# Patient Record
Sex: FEMALE | Race: BLACK OR AFRICAN AMERICAN | Employment: UNEMPLOYED | URBAN - METROPOLITAN AREA
[De-identification: names, ages, dates, MRNs, and addresses within clinical notes are randomized per-mention and may not be internally consistent; named-entity substitution may affect disease eponyms.]

---

## 2017-06-27 ENCOUNTER — MEDICAL CORRESPONDENCE (OUTPATIENT)
Dept: HEALTH INFORMATION MANAGEMENT | Facility: CLINIC | Age: 57
End: 2017-06-27

## 2017-07-11 ENCOUNTER — TRANSFERRED RECORDS (OUTPATIENT)
Dept: HEALTH INFORMATION MANAGEMENT | Facility: CLINIC | Age: 57
End: 2017-07-11

## 2017-07-12 ENCOUNTER — OFFICE VISIT (OUTPATIENT)
Dept: ORTHOPEDICS | Facility: CLINIC | Age: 57
End: 2017-07-12

## 2017-07-12 VITALS
DIASTOLIC BLOOD PRESSURE: 66 MMHG | SYSTOLIC BLOOD PRESSURE: 138 MMHG | HEIGHT: 68 IN | BODY MASS INDEX: 28.34 KG/M2 | WEIGHT: 187 LBS

## 2017-07-12 DIAGNOSIS — R29.898 WEAKNESS OF LEFT HIP: ICD-10-CM

## 2017-07-12 DIAGNOSIS — M25.552 LEFT HIP PAIN: Primary | ICD-10-CM

## 2017-07-12 NOTE — LETTER
"  2017      RE: Nancy Rodríguez  [address]    [facility] [address]        Subjective:   Nancy Rodríguez is a 57 year old female  who is here for left leg and pelvic pain x 2 months.  7 years ago had a left ankle fracture.  Walking and fell, dizziness.  Normal surface.  She had surgery, removal of broken bones, hardware placed, had to remove later.  [place] [facility] [place]  Now pain from hip to knee, on left.  No PT.  Not certain how it started, hard to get up after laying down.  Has to walk with a cane.  Using cane since she broke her ankle.  Wasn't using it much but now with left hip pain she's using the cane more.  Recent informed that she has headaches and DM  Background:   Date of injury: hx of left leg fracture many years ago      PAST MEDICAL, SOCIAL, SURGICAL AND FAMILY HISTORY: She  has a past medical history of NO ACTIVE PROBLEMS.  She  has a past surgical history that includes Ankle surgery.  Her family history is negative for Glaucoma and Macular Degeneration.  She reports that she has never smoked. She has never used smokeless tobacco. She reports that she does not drink alcohol or use illicit drugs.    ALLERGIES: She has No Known Allergies.    CURRENT MEDICATIONS: She has a current medication list which includes the following prescription(s): docusate sodium, calcium carb-cholecalciferol, omeprazole, quetiapine fumarate, aspirin, ibuprofen, and UNKNOWN TO PATIENT.     REVIEW OF SYSTEMS: 10 point review of systems is negative except as noted above.     Exam:   /66  Ht 5' 7.5\" (1.715 m)  Wt 187 lb (84.8 kg)  BMI 28.86 kg/m2           CONSTITUTIONAL: alert, mild distress, cooperative and over weight  HEAD: Normocephalic. No masses, lesions, tenderness or abnormalities  SKIN: no suspicious lesions or rashes  GAIT: antalgic and cane  NEUROLOGIC: Non-focal, Normal muscle tone and strength, reflexes normal, sensation grossly normal.  PSYCHIATRIC: affect normal/bright and mentation appears " "normal.    MUSCULOSKELETAL: left hip pain  Palpation: Tender:   'C\" left hip, lateral left leg, hip abductors weak, left leg significantly weak, left iliac crest  Non-tender:  right greater trochanter, right gluteus medius, left ASIS, right ASIS, right iliac crest  Range of Motion:  Full ROM, both hips  Strength:  Weakness entire left leg  Special tests:  no crepitation/snapping over central inguinal region, no crepitation/snapping over greater trochanter         Assessment/Plan:   Pt is a 56 yo [place]ym[place] female with PMHx of headaches and new onset DM presenting with left hip/leg pain  1. Left hip/leg pain- weakness left hip abductors, weak core- hx of 10 pregnancies.  She does have some decreased ROM in left ankle due to past fracture.  X-ray without significant bony abnormalities.  Needs to strengthen and she was referred to PT  RTC 6 weeks    X-RAY INTERPRETATION:   X-Ray of the Hip: 2-view, ap pelvis, Left Frogleg Lateral ordered and interpreted in the office today was positive for mild OA    Olga Sheth MD    "

## 2017-07-12 NOTE — PROGRESS NOTES
" Subjective:   Nancy Rodríguez is a 57 year old female  who is here for left leg and pelvic pain x 2 months.  7 years ago had a left ankle fracture.  Walking and fell, dizziness.  Normal surface.  She had surgery, removal of broken bones, hardware placed, had to remove later.  [place] [facility] [place]  Now pain from hip to knee, on left.  No PT.  Not certain how it started, hard to get up after laying down.  Has to walk with a cane.  Using cane since she broke her ankle.  Wasn't using it much but now with left hip pain she's using the cane more.  Recent informed that she has headaches and DM  Background:   Date of injury: hx of left leg fracture many years ago      PAST MEDICAL, SOCIAL, SURGICAL AND FAMILY HISTORY: She  has a past medical history of NO ACTIVE PROBLEMS.  She  has a past surgical history that includes Ankle surgery.  Her family history is negative for Glaucoma and Macular Degeneration.  She reports that she has never smoked. She has never used smokeless tobacco. She reports that she does not drink alcohol or use illicit drugs.    ALLERGIES: She has No Known Allergies.    CURRENT MEDICATIONS: She has a current medication list which includes the following prescription(s): docusate sodium, calcium carb-cholecalciferol, omeprazole, quetiapine fumarate, aspirin, ibuprofen, and UNKNOWN TO PATIENT.     REVIEW OF SYSTEMS: 10 point review of systems is negative except as noted above.     Exam:   /66  Ht 5' 7.5\" (1.715 m)  Wt 187 lb (84.8 kg)  BMI 28.86 kg/m2           CONSTITUTIONAL: alert, mild distress, cooperative and over weight  HEAD: Normocephalic. No masses, lesions, tenderness or abnormalities  SKIN: no suspicious lesions or rashes  GAIT: antalgic and cane  NEUROLOGIC: Non-focal, Normal muscle tone and strength, reflexes normal, sensation grossly normal.  PSYCHIATRIC: affect normal/bright and mentation appears normal.    MUSCULOSKELETAL: left hip pain  Palpation: Tender:   'C\" left hip, lateral " left leg, hip abductors weak, left leg significantly weak, left iliac crest  Non-tender:  right greater trochanter, right gluteus medius, left ASIS, right ASIS, right iliac crest  Range of Motion:  Full ROM, both hips  Strength:  Weakness entire left leg  Special tests:  no crepitation/snapping over central inguinal region, no crepitation/snapping over greater trochanter         Assessment/Plan:   Pt is a 58 yo Citizen of [place] female with PMHx of headaches and new onset DM presenting with left hip/leg pain  1. Left hip/leg pain- weakness left hip abductors, weak core- hx of 10 pregnancies.  She does have some decreased ROM in left ankle due to past fracture.  X-ray without significant bony abnormalities.  Needs to strengthen and she was referred to PT  RTC 6 weeks    X-RAY INTERPRETATION:   X-Ray of the Hip: 2-view, ap pelvis, Left Frogleg Lateral ordered and interpreted in the office today was positive for mild OA

## 2017-07-12 NOTE — MR AVS SNAPSHOT
After Visit Summary   7/12/2017    Nancy Rodríguez    MRN: [id number]           Patient Information     Date Of Birth          1960        Visit Information        Provider Department      7/12/2017 9:15 AM Olga Sheth MD; LANGUAGE [facility]        Today's Diagnoses     Left hip pain    -  1    Weakness of left hip           Follow-ups after your visit        Additional Services     PHYSICAL THERAPY REFERRAL (External-Prints)       Physical Therapy Referral:                  Follow-up notes from your care team     Return in about 6 weeks (around 8/23/2017), or if symptoms worsen or fail to improve.      Your next 10 appointments already scheduled     Aug 23, 2017 10:30 AM CDT   (Arrive by 10:15 AM)   Return Visit with Olga Sheth MD   [facility] ([facility])    [address] [place] [id number] [phone]              Who to contact     Please call your clinic at [phone] to:    Ask questions about your health    Make or cancel appointments    Discuss your medicines    Learn about your test results    Speak to your doctor   If you have compliments or concerns about an experience at your clinic, or if you wish to file a complaint, please contact [facility] Patient Relations at [phone] or email us at [email or web address]         Additional Information About Your Visit        MyChart Information     Prismatict is an electronic gateway that provides easy, online access to your medical records. With [employer], you can request a clinic appointment, read your test results, renew a prescription or communicate with your care team.     To sign up for Prismatict visit the website at [email or web address]   You will be asked to enter the access code listed below, as well as some personal information. Please follow the directions to create your  "username and password.     Your access code is: [email or web address]  Expires: 10/5/2017  6:30 AM     Your access code will  in 90 days. If you need help or a new code, please contact your [facility] or call [phone] for assistance.        Care EveryWhere ID     This is your Care EveryWhere ID. This could be used by other organizations to access your [facility] medical records  [id number]        Your Vitals Were     Height BMI (Body Mass Index)                5' 7.5\" (1.715 m) 28.86 kg/m2           Blood Pressure from Last 3 Encounters:   17 138/66   11 132/71    Weight from Last 3 Encounters:   17 187 lb (84.8 kg)   11 182 lb 8.7 oz (82.8 kg)              We Performed the Following     PHYSICAL THERAPY REFERRAL (External-Prints)        Primary Care Provider Office Phone # Fax #    Cordovaolinda Nick [phone]       [place] [address]        Equal Access to Services     TAVIA [facility] : Hadii annalee ku hadasho Soomaali, waaxda luqadaha, qaybta kaalmada adejah, denzel chance . So [facility] [phone].    ATENCIÓN: Si habla español, tiene a robles disposición servicios gratuitos de asistencia lingüística. [facility] [phone].    We comply with applicable federal civil rights laws and [place] laws. We do not discriminate on the basis of race, color, national origin, age, disability sex, sexual orientation or gender identity.            Thank you!     Thank you for choosing [facility]  for your care. Our goal is always to provide you with excellent care. Hearing back from our patients is one way we can continue to improve our services. Please take a few minutes to complete the written survey that you may receive in the mail after your visit with us. Thank you!             Your Updated Medication List - Protect others around you: Learn how to safely use, store and " throw away your medicines at www.disposemymeds.org.          This list is accurate as of: 7/12/17 10:50 AM.  Always use your most recent med list.                   Brand Name Dispense Instructions for use Diagnosis    ASPIRIN PO      Take 81 mg by mouth daily        CALCIUM 600 + D PO      Take 1 tablet by mouth daily        DOCUSATE SODIUM PO      Take 100 mg by mouth daily        IBUPROFEN PO      Take 600 mg by mouth as needed for moderate pain        OMEPRAZOLE PO      Take 20 mg by mouth every morning        QUETIAPINE FUMARATE PO      Take 50 mg by mouth        UNKNOWN TO PATIENT      Pt states that she takes many medications, but is uncertain what they are for and what dosages they are.

## 2017-07-18 ENCOUNTER — TRANSFERRED RECORDS (OUTPATIENT)
Dept: HEALTH INFORMATION MANAGEMENT | Facility: CLINIC | Age: 57
End: 2017-07-18

## 2017-08-17 ENCOUNTER — PRE VISIT (OUTPATIENT)
Dept: ORTHOPEDICS | Facility: CLINIC | Age: 57
End: 2017-08-17

## 2017-08-17 NOTE — TELEPHONE ENCOUNTER
1.  Date/reason for appt: 8/30/17- Diabetic Foot     2.  Referring provider: [facility]     3.  Call to patient (Yes / No - short description): No - pt is referred.     4.  Previous care at / records requested from:     1. [facility] - records are scanned in Epic    -Office notes 7/17/17    2. [facility] - 1/7/16     -Xrays from 2015 in [facility]   3. [facility] - Need to request    Missing:  Records from [facility] (Ankle surgery 5 yrs ago) - faxed cover sheet and mailed MALU home to sign.

## 2017-08-23 ENCOUNTER — OFFICE VISIT (OUTPATIENT)
Dept: ORTHOPEDICS | Facility: CLINIC | Age: 57
End: 2017-08-23

## 2017-08-23 VITALS
HEART RATE: 75 BPM | WEIGHT: 183.6 LBS | SYSTOLIC BLOOD PRESSURE: 126 MMHG | OXYGEN SATURATION: 97 % | BODY MASS INDEX: 28.33 KG/M2 | DIASTOLIC BLOOD PRESSURE: 70 MMHG

## 2017-08-23 DIAGNOSIS — M54.42 ACUTE LEFT-SIDED LOW BACK PAIN WITH LEFT-SIDED SCIATICA: ICD-10-CM

## 2017-08-23 DIAGNOSIS — L84 CALLUS OF FOOT: Primary | ICD-10-CM

## 2017-08-23 NOTE — MR AVS SNAPSHOT
After Visit Summary   2017    Nancy Rodríguez    MRN: [id number]           Patient Information     Date Of Birth          1960        Visit Information        Provider Department      2017 10:15 AM Olga Sheth MD; LANGUAGE [facility]        Today's Diagnoses     Callus of foot    -  1    Acute left-sided low back pain with left-sided sciatica           Follow-ups after your visit        Follow-up notes from your care team     Return if symptoms worsen or fail to improve.      Your next 10 appointments already scheduled     Aug 30, 2017  8:20 AM CDT   (Arrive by 8:05 AM)   NEW FOOT/ANKLE with Steven Dhaliwal DPM   [facility] ([facility])    [address] [place] [id number] [phone]              Who to contact     Please call your clinic at [phone] to:    Ask questions about your health    Make or cancel appointments    Discuss your medicines    Learn about your test results    Speak to your doctor   If you have compliments or concerns about an experience at your clinic, or if you wish to file a complaint, please contact [facility] Patient Relations at [phone] or email us at [email or web address]         Additional Information About Your Visit        MyChart Information     Plastic Jungle is an electronic gateway that provides easy, online access to your medical records. With [employer], you can request a clinic appointment, read your test results, renew a prescription or communicate with your care team.     To sign up for Transparency Softwaret visit the website at [email or web address]   You will be asked to enter the access code listed below, as well as some personal information. Please follow the directions to create your username and password.     Your access code is: [email or web address]  Expires: 10/5/2017  6:30 AM     Your access code will  in 90  days. If you need help or a new code, please contact your [facility] or call [phone] for assistance.        Care EveryWhere ID     This is your Care EveryWhere ID. This could be used by other organizations to access your [facility] medical records  [id number]        Your Vitals Were     Pulse Pulse Oximetry BMI (Body Mass Index)             75 97% 28.33 kg/m2          Blood Pressure from Last 3 Encounters:   08/23/17 126/70   07/12/17 138/66   08/30/11 132/71    Weight from Last 3 Encounters:   08/23/17 183 lb 9.6 oz (83.3 kg)   07/12/17 187 lb (84.8 kg)   08/30/11 182 lb 8.7 oz (82.8 kg)              We Performed the Following     TRIM HYPERKERATOTIC SKIN LESION, ONE        Primary Care Provider Office Phone # Fax #    Tasha LENY Nick [phone]       [facility] [address] [place] [address]        Equal Access to Services     RENETTA RIVERA : Hadii aad ku hadasho Soomaali, waaxda luqadaha, qaybta kaalmada adeegyada, waxay idiin hayaan markeg miguel a chance . So [facility] [phone].    ATENCIÓN: Si habla español, tiene a robles disposición servicios gratuitos de asistencia lingüística. Llame al [phone].    We comply with applicable federal civil rights laws and [place] laws. We do not discriminate on the basis of race, color, national origin, age, disability sex, sexual orientation or gender identity.            Thank you!     Thank you for choosing [facility] [employer] [facility]  for your care. Our goal is always to provide you with excellent care. Hearing back from our patients is one way we can continue to improve our services. Please take a few minutes to complete the written survey that you may receive in the mail after your visit with us. Thank you!             Your Updated Medication List - Protect others around you: Learn how to safely use, store and throw away your medicines at www.disposemymeds.org.          This list is accurate as  of: 8/23/17  1:30 PM.  Always use your most recent med list.                   Brand Name Dispense Instructions for use Diagnosis    ASPIRIN PO      Take 81 mg by mouth daily        CALCIUM 600 + D PO      Take 1 tablet by mouth daily        DOCUSATE SODIUM PO      Take 100 mg by mouth daily        IBUPROFEN PO      Take 600 mg by mouth as needed for moderate pain        OMEPRAZOLE PO      Take 20 mg by mouth every morning        QUETIAPINE FUMARATE PO      Take 50 mg by mouth        UNKNOWN TO PATIENT      Pt states that she takes many medications, but is uncertain what they are for and what dosages they are.

## 2017-08-23 NOTE — PROGRESS NOTES
Subjective:   Nancy Rodríguez is a 57 year old female who is here for a follow up for L lower leg pain and L pelvic bone with instability.  Much better with PT.  Feels much stronger.    Pain lateral left foot, started after broken ankle.  Pt reports she was walking differently.    Date of injury: N/A  Date last seen: 7/12/2017  Following Therapeutic Plan: Yes, PT 2x/ week  Pain: Improving  Function: Improving  Interval History: N/A     PAST MEDICAL, SOCIAL, SURGICAL AND FAMILY HISTORY: She  has a past medical history of Diabetes (H); Headache; and NO ACTIVE PROBLEMS.  She  has a past surgical history that includes Ankle surgery.  Her family history is negative for Glaucoma and Macular Degeneration.  She reports that she has never smoked. She has never used smokeless tobacco. She reports that she does not drink alcohol or use illicit drugs.      ALLERGIES: She has No Known Allergies.    CURRENT MEDICATIONS: She has a current medication list which includes the following prescription(s): docusate sodium, calcium carb-cholecalciferol, omeprazole, quetiapine fumarate, aspirin, ibuprofen, and UNKNOWN TO PATIENT.     REVIEW OF SYSTEMS: 10 point review of systems is negative except as noted above.     Exam:   /70  Pulse 75  Wt 183 lb 9.6 oz (83.3 kg)  SpO2 97%  BMI 28.33 kg/m2           CONSTITUTIONAL: healthy, alert, no distress, cooperative and over weight  HEAD: Normocephalic. No masses, lesions, tenderness or abnormalities  SKIN: no suspicious lesions or rashes  GAIT: normal  NEUROLOGIC: Non-focal, Normal muscle tone and strength, reflexes normal, sensation grossly normal.  PSYCHIATRIC: affect normal/bright and mentation appears normal.    MUSCULOSKELETAL: left leg and pelvis- pain resolved, callus lateral left foot    Procedure: Callus lateral left foot- 15 blade to pare down the area, callus reduced, no complications or bleeding  Tender:  none  Non-tender:  thoracic spinous processes, left parathoracic  muscles, right parathoracic muscles, lumbar spinous processes  Range of Motion:  lumbar flexion  full, lumbar extension  full  Strength:  able to heel walk, unable to toe walk  Special tests:  negative straight leg raises    Hip Exam: Hip ROM full       Assessment/Plan:   Pt is a 56 yo [place]ese female with PMHx of back pain presenting with left foot callus, left sided back pain resolved  1. Left sided low back pain- now resolved, helpful that she had PT  2. Left foot callus- pared down with 15 blade  RTC prn  X-RAY INTERPRETATION:   none

## 2017-08-23 NOTE — LETTER
8/23/2017      RE: Nancy Rodríguez  [address]    [facility] [address]        Subjective:   Nancy Rodríguez is a 57 year old female who is here for a follow up for L lower leg pain and L pelvic bone with instability.  Much better with PT.  Feels much stronger.    Pain lateral left foot, started after broken ankle.  Pt reports she was walking differently.    Date of injury: N/A  Date last seen: 7/12/2017  Following Therapeutic Plan: Yes, PT 2x/ week  Pain: Improving  Function: Improving  Interval History: N/A     PAST MEDICAL, SOCIAL, SURGICAL AND FAMILY HISTORY: She  has a past medical history of Diabetes (H); Headache; and NO ACTIVE PROBLEMS.  She  has a past surgical history that includes Ankle surgery.  Her family history is negative for Glaucoma and Macular Degeneration.  She reports that she has never smoked. She has never used smokeless tobacco. She reports that she does not drink alcohol or use illicit drugs.      ALLERGIES: She has No Known Allergies.    CURRENT MEDICATIONS: She has a current medication list which includes the following prescription(s): docusate sodium, calcium carb-cholecalciferol, omeprazole, quetiapine fumarate, aspirin, ibuprofen, and UNKNOWN TO PATIENT.     REVIEW OF SYSTEMS: 10 point review of systems is negative except as noted above.     Exam:   /70  Pulse 75  Wt 183 lb 9.6 oz (83.3 kg)  SpO2 97%  BMI 28.33 kg/m2           CONSTITUTIONAL: healthy, alert, no distress, cooperative and over weight  HEAD: Normocephalic. No masses, lesions, tenderness or abnormalities  SKIN: no suspicious lesions or rashes  GAIT: normal  NEUROLOGIC: Non-focal, Normal muscle tone and strength, reflexes normal, sensation grossly normal.  PSYCHIATRIC: affect normal/bright and mentation appears normal.    MUSCULOSKELETAL: left leg and pelvis- pain resolved, callus lateral left foot    Procedure: Callus lateral left foot- 15 blade to pare down the area, callus reduced, no complications or  bleeding  Tender:  none  Non-tender:  thoracic spinous processes, left parathoracic muscles, right parathoracic muscles, lumbar spinous processes  Range of Motion:  lumbar flexion  full, lumbar extension  full  Strength:  able to heel walk, unable to toe walk  Special tests:  negative straight leg raises    Hip Exam: Hip ROM full       Assessment/Plan:   Pt is a 56 yo Maltese female with PMHx of back pain presenting with left foot callus, left sided back pain resolved  1. Left sided low back pain- now resolved, helpful that she had PT  2. Left foot callus- pared down with 15 blade  RTC prn  X-RAY INTERPRETATION:   none    Olga Sheth MD

## 2017-08-25 NOTE — TELEPHONE ENCOUNTER
Called pt with  service. No answer. Left detailed message for patient to sign the MALU form and mail it back to me.

## 2017-10-03 ENCOUNTER — MEDICAL CORRESPONDENCE (OUTPATIENT)
Dept: HEALTH INFORMATION MANAGEMENT | Facility: CLINIC | Age: 57
End: 2017-10-03

## 2017-10-18 ENCOUNTER — OFFICE VISIT (OUTPATIENT)
Dept: OPHTHALMOLOGY | Facility: CLINIC | Age: 57
End: 2017-10-18
Attending: OPHTHALMOLOGY
Payer: COMMERCIAL

## 2017-10-18 DIAGNOSIS — H52.7 REFRACTIVE ERROR: ICD-10-CM

## 2017-10-18 DIAGNOSIS — H25.13 AGE-RELATED NUCLEAR CATARACT OF BOTH EYES: ICD-10-CM

## 2017-10-18 DIAGNOSIS — E11.9 DIABETES MELLITUS WITHOUT COMPLICATION (H): Primary | ICD-10-CM

## 2017-10-18 PROCEDURE — 99213 OFFICE O/P EST LOW 20 MIN: CPT | Mod: ZF

## 2017-10-18 PROCEDURE — 92015 DETERMINE REFRACTIVE STATE: CPT | Mod: ZF

## 2017-10-18 ASSESSMENT — TONOMETRY
OD_IOP_MMHG: 21
OS_IOP_MMHG: 20
IOP_METHOD: TONOPEN

## 2017-10-18 ASSESSMENT — REFRACTION_WEARINGRX
OD_SPHERE: -0.50
OD_ADD: +2.50
OS_CYLINDER: SPHERE
OD_CYLINDER: +0.50
OS_ADD: +2.50
OS_SPHERE: -0.50
OD_AXIS: 170

## 2017-10-18 ASSESSMENT — VISUAL ACUITY
CORRECTION_TYPE: GLASSES
OD_CC: 20/20
OS_CC+: -1
OS_CC: 20/25
METHOD: SNELLEN - LINEAR

## 2017-10-18 ASSESSMENT — CONF VISUAL FIELD
OS_NORMAL: 1
OD_NORMAL: 1
METHOD: COUNTING FINGERS

## 2017-10-18 ASSESSMENT — SLIT LAMP EXAM - LIDS
COMMENTS: NORMAL
COMMENTS: NORMAL

## 2017-10-18 ASSESSMENT — REFRACTION_MANIFEST
OD_SPHERE: -0.75
OS_SPHERE: -0.75
OS_ADD: +2.75
OD_AXIS: 135
OS_CYLINDER: SPHERE
OD_ADD: +2.75
OD_CYLINDER: +0.50

## 2017-10-18 ASSESSMENT — EXTERNAL EXAM - RIGHT EYE: OD_EXAM: NORMAL

## 2017-10-18 ASSESSMENT — CUP TO DISC RATIO
OD_RATIO: 0.5
OS_RATIO: 0.5

## 2017-10-18 ASSESSMENT — EXTERNAL EXAM - LEFT EYE: OS_EXAM: NORMAL

## 2017-10-18 NOTE — MR AVS SNAPSHOT
After Visit Summary   10/18/2017    Nancy Rodríguez    MRN: [id number]           Patient Information     Date Of Birth          1960        Visit Information        Provider Department      10/18/2017 8:00 AM Lucas Elder MD; LANGUAGE [facility]        Today's Diagnoses     Diabetes mellitus without complication (H)    -  1    Age-related nuclear cataract of both eyes        Refractive error           Follow-ups after your visit        Follow-up notes from your care team     Return in about 1 year (around 10/18/2018).      Who to contact     Please call your clinic at [phone] to:    Ask questions about your health    Make or cancel appointments    Discuss your medicines    Learn about your test results    Speak to your doctor   If you have compliments or concerns about an experience at your clinic, or if you wish to file a complaint, please contact [facility] Patient Relations at [phone] or email us at [email or web address]         Additional Information About Your Visit        MyChart Information     NetManage is an electronic gateway that provides easy, online access to your medical records. With NetManage, you can request a clinic appointment, read your test results, renew a prescription or communicate with your care team.     To sign up for BoosterMediat visit the website at [email or web address]   You will be asked to enter the access code listed below, as well as some personal information. Please follow the directions to create your username and password.     Your access code is: [email or web address]  Expires: 1/3/2018  6:31 AM     Your access code will  in 90 days. If you need help or a new code, please contact your [facility] or call [phone] for assistance.        Care EveryWhere ID     This is your Care EveryWhere ID. This could be used by other organizations to access your [facility]  records  [id number]         Blood Pressure from Last 3 Encounters:   08/23/17 126/70   07/12/17 138/66   08/30/11 132/71    Weight from Last 3 Encounters:   08/23/17 83.3 kg (183 lb 9.6 oz)   07/12/17 84.8 kg (187 lb)   08/30/11 82.8 kg (182 lb 8.7 oz)              Today, you had the following     No orders found for display       Primary Care Provider Office Phone # Fax #    Tasha LENY Nick [phone]       [facility] [address] [facility] [address]        Equal Access to Services     [facility] : Hadii annalee haynes Soavery, walulúda lubakariadaha, qaybta kaalmada jerry, denzel aguillon. So [facility] [phone].    ATENCIÓN: Si habla español, tiene a robles disposición servicios gratuitos de asistencia lingüística. [facility] [phone].    We comply with applicable federal civil rights laws and [place] laws. We do not discriminate on the basis of race, color, national origin, age, disability, sex, sexual orientation, or gender identity.            Thank you!     Thank you for choosing EYE CLINIC  for your care. Our goal is always to provide you with excellent care. Hearing back from our patients is one way we can continue to improve our services. Please take a few minutes to complete the written survey that you may receive in the mail after your visit with us. Thank you!             Your Updated Medication List - Protect others around you: Learn how to safely use, store and throw away your medicines at www.disposemymeds.org.          This list is accurate as of: 10/18/17  9:43 AM.  Always use your most recent med list.                   Brand Name Dispense Instructions for use Diagnosis    ASPIRIN PO      Take 81 mg by mouth daily        CALCIUM 600 + D PO      Take 1 tablet by mouth daily        DOCUSATE SODIUM PO      Take 100 mg by mouth daily        IBUPROFEN PO      Take 600 mg by mouth as needed for moderate pain        OMEPRAZOLE PO       Take 20 mg by mouth every morning        QUETIAPINE FUMARATE PO      Take 50 mg by mouth        UNKNOWN TO PATIENT      Pt states that she takes many medications, but is uncertain what they are for and what dosages they are.

## 2017-10-18 NOTE — PROGRESS NOTES
Assessment & Plan      Nancy Rodríguez is a 57 year old female with the following diagnoses:   1. Diabetes mellitus without complication (H)    2. Age-related nuclear cataract of both eyes    3. Refractive error         No retinopathy  Stressed good glycemic and hypertensive control  Cataract not visually significant at this time  Refractive options reviewed  Refraction given   Recommend additional light with reading  Monitor yearly       Patient disposition:   Return in about 1 year (around 10/18/2018).          Attending Physician Attestation:  Complete documentation of historical and exam elements from today's encounter can be found in the full encounter summary report (not reduplicated in this progress note).  I personally obtained the chief complaint(s) and history of present illness.  I confirmed and edited as necessary the review of systems, past medical/surgical history, family history, social history, and examination findings as documented by others; and I examined the patient myself.  I personally reviewed the relevant tests, images, and reports as documented above.  I formulated and edited as necessary the assessment and plan and discussed the findings and management plan with the patient and family. . - Lucas Elder MD

## 2017-10-18 NOTE — NURSING NOTE
Chief Complaints and History of Present Illnesses   Patient presents with     Eye Exam For Diabetes     HPI    Affected eye(s):  Both   Symptoms:        Frequency:  Constant       Do you have eye pain now?:  No      Comments:  States va the near va has decreased  No F&F  Dx 2017  -140  A1C unsure  Sarah Ashford COT 8:26 AM October 18, 2017

## 2017-10-18 NOTE — LETTER
10/18/2017       RE: Nancy Rodríguez  [address]    [place] [address]     Dear Colleague,    Thank you for referring your patient, Nancy Rodríguez, to the EYE CLINIC at [facility]. Please see a copy of my visit note below.    Assessment & Plan      Nancy Rodríguez is a 57 year old female with the following diagnoses:   1. Diabetes mellitus without complication (H)    2. Age-related nuclear cataract of both eyes    3. Refractive error         No retinopathy  Stressed good glycemic and hypertensive control  Cataract not visually significant at this time  Refractive options reviewed  Refraction given   Recommend additional light with reading  Monitor yearly       Patient disposition:   Return in about 1 year (around 10/18/2018).          Attending Physician Attestation:  Complete documentation of historical and exam elements from today's encounter can be found in the full encounter summary report (not reduplicated in this progress note).  I personally obtained the chief complaint(s) and history of present illness.  I confirmed and edited as necessary the review of systems, past medical/surgical history, family history, social history, and examination findings as documented by others; and I examined the patient myself.  I personally reviewed the relevant tests, images, and reports as documented above.  I formulated and edited as necessary the assessment and plan and discussed the findings and management plan with the patient and family. . - Lucas Elder MD       Again, thank you for allowing me to participate in the care of your patient.      Sincerely,    Lucas Elder MD

## 2018-01-03 ENCOUNTER — HOSPITAL ENCOUNTER (OUTPATIENT)
Dept: GENERAL RADIOLOGY | Facility: CLINIC | Age: 58
Discharge: HOME | End: 2018-01-03
Payer: COMMERCIAL

## 2018-01-03 DIAGNOSIS — M25.561 MEDIAL KNEE PAIN, RIGHT: ICD-10-CM

## 2018-01-03 PROCEDURE — 73562 X-RAY EXAM OF KNEE 3: CPT | Mod: RT

## 2018-08-30 ENCOUNTER — OFFICE VISIT (OUTPATIENT)
Dept: OPHTHALMOLOGY | Facility: CLINIC | Age: 58
End: 2018-08-30
Attending: OPHTHALMOLOGY
Payer: COMMERCIAL

## 2018-08-30 DIAGNOSIS — H52.7 REFRACTIVE ERROR: ICD-10-CM

## 2018-08-30 DIAGNOSIS — E11.9 DIABETES MELLITUS WITHOUT COMPLICATION (H): ICD-10-CM

## 2018-08-30 DIAGNOSIS — H25.13 AGE-RELATED NUCLEAR CATARACT OF BOTH EYES: Primary | ICD-10-CM

## 2018-08-30 PROCEDURE — G0463 HOSPITAL OUTPT CLINIC VISIT: HCPCS | Mod: ZF

## 2018-08-30 ASSESSMENT — REFRACTION_WEARINGRX
OS_ADD: +2.75
OS_SPHERE: -0.75
OS_CYLINDER: SPHERE
OD_ADD: +2.75
OD_SPHERE: -0.75
SPECS_TYPE: BIFOCAL
OD_AXIS: 136
OD_CYLINDER: +0.50

## 2018-08-30 ASSESSMENT — CONF VISUAL FIELD
OD_NORMAL: 1
METHOD: COUNTING FINGERS
OS_NORMAL: 1

## 2018-08-30 ASSESSMENT — VISUAL ACUITY
OS_CC: J1
METHOD: SNELLEN - LINEAR
CORRECTION_TYPE: GLASSES
OS_CC+: -1
OD_CC+: +1
OS_CC: 20/20
OD_CC: J1
OD_CC: 20/20

## 2018-08-30 ASSESSMENT — TONOMETRY
OS_IOP_MMHG: 15
OD_IOP_MMHG: 15
IOP_METHOD: TONOPEN

## 2018-08-30 ASSESSMENT — SLIT LAMP EXAM - LIDS
COMMENTS: NORMAL
COMMENTS: NORMAL

## 2018-08-30 ASSESSMENT — CUP TO DISC RATIO
OS_RATIO: 0.55
OD_RATIO: 0.5

## 2018-08-30 ASSESSMENT — EXTERNAL EXAM - LEFT EYE: OS_EXAM: NORMAL

## 2018-08-30 ASSESSMENT — EXTERNAL EXAM - RIGHT EYE: OD_EXAM: NORMAL

## 2018-08-30 NOTE — MR AVS SNAPSHOT
After Visit Summary   2018    Nancy Rodríguez    MRN: [id number]           Patient Information     Date Of Birth          1960        Visit Information        Provider Department      2018 3:00 PM Lucas Elder MD; LANGUAGE [facility]        Today's Diagnoses     Age-related nuclear cataract of both eyes    -  1    Diabetes mellitus without complication (H)        Refractive error           Follow-ups after your visit        Follow-up notes from your care team     Return in about 1 year (around 2019) for DFE.      Who to contact     Please call your clinic at [phone] to:    Ask questions about your health    Make or cancel appointments    Discuss your medicines    Learn about your test results    Speak to your doctor            Additional Information About Your Visit        MyChart Information     My Rental Unitst is an electronic gateway that provides easy, online access to your medical records. With TradingScreen, you can request a clinic appointment, read your test results, renew a prescription or communicate with your care team.     To sign up for My Rental Unitst visit the website at [email or web address]   You will be asked to enter the access code listed below, as well as some personal information. Please follow the directions to create your username and password.     Your access code is: [email or web address]  Expires: 2018  6:31 AM     Your access code will  in 90 days. If you need help or a new code, please contact your [facility] or call [phone] for assistance.        Care EveryWhere ID     This is your Care EveryWhere ID. This could be used by other organizations to access your [facility] medical records  [id number]         Blood Pressure from Last 3 Encounters:   17 126/70   17 138/66   11 132/71    Weight from Last 3 Encounters:   17 83.3 kg (183 lb 9.6 oz)   17 84.8 kg (187 lb)   11 82.8 kg (182 lb  8.7 oz)              Today, you had the following     No orders found for display       Primary Care Provider Office Phone # Fax #    Tasha NickLENY [phone]       [facility] [address] [facility] [address]        Equal Access to Services     [place] : Hadii annalee ku hadlexyo Soomaali, waaxda luqadaha, qaybta kaalmada adeegyada, waxshiraz idiin haydianan adecuong grady meron aguillon. So [facility] [phone].    ATENCIÓN: Si habla español, tiene a robles disposición servicios gratuitos de asistencia lingüística. Llame al [phone].    We comply with applicable federal civil rights laws and [place] laws. We do not discriminate on the basis of race, color, national origin, age, disability, sex, sexual orientation, or gender identity.            Thank you!     Thank you for choosing EYE CLINIC  for your care. Our goal is always to provide you with excellent care. Hearing back from our patients is one way we can continue to improve our services. Please take a few minutes to complete the written survey that you may receive in the mail after your visit with us. Thank you!             Your Updated Medication List - Protect others around you: Learn how to safely use, store and throw away your medicines at www.disposemymeds.org.          This list is accurate as of 8/30/18  4:32 PM.  Always use your most recent med list.                   Brand Name Dispense Instructions for use Diagnosis    ASPIRIN PO      Take 81 mg by mouth daily        CALCIUM 600 + D PO      Take 1 tablet by mouth daily        DOCUSATE SODIUM PO      Take 100 mg by mouth daily        IBUPROFEN PO      Take 600 mg by mouth as needed for moderate pain        OMEPRAZOLE PO      Take 20 mg by mouth every morning        QUETIAPINE FUMARATE PO      Take 50 mg by mouth        UNKNOWN TO PATIENT      Pt states that she takes many medications, but is uncertain what they are for and what dosages they are.

## 2018-08-30 NOTE — PROGRESS NOTES
Assessment & Plan      Nancy Rodríguez is a 58 year old female with the following diagnoses:   1. Age-related nuclear cataract of both eyes    2. Diabetes mellitus without complication (H)    3. Refractive error         Overall happy with vision, no change from last year  No retinopathy  Stressed good glycemic and hypertensive control  Cataract not visually significant at this time        Patient disposition:   Return in about 1 year (around 8/30/2019) for DFE.          Attending Physician Attestation:  Complete documentation of historical and exam elements from today's encounter can be found in the full encounter summary report (not reduplicated in this progress note).  I personally obtained the chief complaint(s) and history of present illness.  I confirmed and edited as necessary the review of systems, past medical/surgical history, family history, social history, and examination findings as documented by others; and I examined the patient myself.  I personally reviewed the relevant tests, images, and reports as documented above.  I formulated and edited as necessary the assessment and plan and discussed the findings and management plan with the patient and family. . - Lucas Elder MD

## 2018-08-30 NOTE — NURSING NOTE
No chief complaint on file.    HPI    Affected eye(s):  Both   Symptoms:     No distorted vision   No difficulty with reading   No difficulty watching television   No floaters   No flashes   Itching      Duration:  1 week   Frequency:  Intermittent       Do you have eye pain now?:  No      Comments:  Annual exam  Using art tears for itchy eyes which provide no relief.  Allergy pills provide better relief.    Marisabel Carlton COT 3:57 PM 08/30/2018

## 2020-11-30 ENCOUNTER — HOSPITAL ENCOUNTER (EMERGENCY)
Facility: CLINIC | Age: 60
Discharge: HOME | End: 2020-12-01
Attending: EMERGENCY MEDICINE | Admitting: EMERGENCY MEDICINE
Payer: COMMERCIAL

## 2020-11-30 ENCOUNTER — APPOINTMENT (OUTPATIENT)
Dept: GENERAL RADIOLOGY | Facility: CLINIC | Age: 60
End: 2020-11-30
Attending: EMERGENCY MEDICINE
Payer: COMMERCIAL

## 2020-11-30 ENCOUNTER — APPOINTMENT (OUTPATIENT)
Dept: CT IMAGING | Facility: CLINIC | Age: 60
End: 2020-11-30
Attending: EMERGENCY MEDICINE
Payer: COMMERCIAL

## 2020-11-30 ENCOUNTER — APPOINTMENT (OUTPATIENT)
Dept: INTERPRETER SERVICES | Facility: CLINIC | Age: 60
End: 2020-11-30

## 2020-11-30 VITALS
DIASTOLIC BLOOD PRESSURE: 71 MMHG | TEMPERATURE: 97.8 F | WEIGHT: 185 LBS | SYSTOLIC BLOOD PRESSURE: 131 MMHG | RESPIRATION RATE: 16 BRPM | OXYGEN SATURATION: 100 % | HEART RATE: 67 BPM

## 2020-11-30 DIAGNOSIS — N30.00 ACUTE CYSTITIS WITHOUT HEMATURIA: ICD-10-CM

## 2020-11-30 DIAGNOSIS — M54.50 ACUTE BILATERAL LOW BACK PAIN WITHOUT SCIATICA: ICD-10-CM

## 2020-11-30 LAB
ALBUMIN SERPL-MCNC: 3.6 G/DL (ref 3.4–5)
ALBUMIN UR-MCNC: NEGATIVE MG/DL
ALP SERPL-CCNC: 81 U/L (ref 40–150)
ALT SERPL W P-5'-P-CCNC: 28 U/L (ref 0–50)
ANION GAP SERPL CALCULATED.3IONS-SCNC: 6 MMOL/L (ref 3–14)
APPEARANCE UR: CLEAR
AST SERPL W P-5'-P-CCNC: 17 U/L (ref 0–45)
BACTERIA #/AREA URNS HPF: ABNORMAL /HPF
BASOPHILS # BLD AUTO: 0.1 10E9/L (ref 0–0.2)
BASOPHILS NFR BLD AUTO: 0.9 %
BILIRUB SERPL-MCNC: 0.3 MG/DL (ref 0.2–1.3)
BILIRUB UR QL STRIP: NEGATIVE
BUN SERPL-MCNC: 14 MG/DL (ref 7–30)
CALCIUM SERPL-MCNC: 8.6 MG/DL (ref 8.5–10.1)
CHLORIDE SERPL-SCNC: 107 MMOL/L (ref 94–109)
CO2 SERPL-SCNC: 27 MMOL/L (ref 20–32)
COLOR UR AUTO: ABNORMAL
CREAT SERPL-MCNC: 0.5 MG/DL (ref 0.52–1.04)
DIFFERENTIAL METHOD BLD: ABNORMAL
EOSINOPHIL # BLD AUTO: 0.1 10E9/L (ref 0–0.7)
EOSINOPHIL NFR BLD AUTO: 0.9 %
ERYTHROCYTE [DISTWIDTH] IN BLOOD BY AUTOMATED COUNT: 18.9 % (ref 10–15)
GFR SERPL CREATININE-BSD FRML MDRD: >90 ML/MIN/{1.73_M2}
GLUCOSE SERPL-MCNC: 96 MG/DL (ref 70–99)
GLUCOSE UR STRIP-MCNC: NEGATIVE MG/DL
HCG SERPL QL: NEGATIVE
HCT VFR BLD AUTO: 36.6 % (ref 35–47)
HGB BLD-MCNC: 10.5 G/DL (ref 11.7–15.7)
HGB UR QL STRIP: NEGATIVE
IMM GRANULOCYTES # BLD: 0.2 10E9/L (ref 0–0.4)
IMM GRANULOCYTES NFR BLD: 2.4 %
KETONES UR STRIP-MCNC: NEGATIVE MG/DL
LEUKOCYTE ESTERASE UR QL STRIP: ABNORMAL
LIPASE SERPL-CCNC: 129 U/L (ref 73–393)
LYMPHOCYTES # BLD AUTO: 2 10E9/L (ref 0.8–5.3)
LYMPHOCYTES NFR BLD AUTO: 29.9 %
MCH RBC QN AUTO: 28.2 PG (ref 26.5–33)
MCHC RBC AUTO-ENTMCNC: 28.7 G/DL (ref 31.5–36.5)
MCV RBC AUTO: 98 FL (ref 78–100)
MONOCYTES # BLD AUTO: 0.6 10E9/L (ref 0–1.3)
MONOCYTES NFR BLD AUTO: 9 %
NEUTROPHILS # BLD AUTO: 3.9 10E9/L (ref 1.6–8.3)
NEUTROPHILS NFR BLD AUTO: 56.9 %
NITRATE UR QL: NEGATIVE
NRBC # BLD AUTO: 0.2 10*3/UL
NRBC BLD AUTO-RTO: 3 /100
PH UR STRIP: 5 PH (ref 5–7)
PLATELET # BLD AUTO: 470 10E9/L (ref 150–450)
POTASSIUM SERPL-SCNC: 3.8 MMOL/L (ref 3.4–5.3)
PROT SERPL-MCNC: 7.3 G/DL (ref 6.8–8.8)
RBC # BLD AUTO: 3.73 10E12/L (ref 3.8–5.2)
RBC #/AREA URNS AUTO: <1 /HPF (ref 0–2)
SODIUM SERPL-SCNC: 140 MMOL/L (ref 133–144)
SOURCE: ABNORMAL
SP GR UR STRIP: 1.01 (ref 1–1.03)
SQUAMOUS #/AREA URNS AUTO: <1 /HPF (ref 0–1)
UROBILINOGEN UR STRIP-MCNC: NORMAL MG/DL (ref 0–2)
WBC # BLD AUTO: 6.8 10E9/L (ref 4–11)
WBC #/AREA URNS AUTO: 6 /HPF (ref 0–5)

## 2020-11-30 PROCEDURE — 80053 COMPREHEN METABOLIC PANEL: CPT | Performed by: EMERGENCY MEDICINE

## 2020-11-30 PROCEDURE — 72131 CT LUMBAR SPINE W/O DYE: CPT

## 2020-11-30 PROCEDURE — 73523 X-RAY EXAM HIPS BI 5/> VIEWS: CPT

## 2020-11-30 PROCEDURE — 81001 URINALYSIS AUTO W/SCOPE: CPT | Performed by: EMERGENCY MEDICINE

## 2020-11-30 PROCEDURE — 87086 URINE CULTURE/COLONY COUNT: CPT | Performed by: EMERGENCY MEDICINE

## 2020-11-30 PROCEDURE — 250N000011 HC RX IP 250 OP 636: Performed by: EMERGENCY MEDICINE

## 2020-11-30 PROCEDURE — 85025 COMPLETE CBC W/AUTO DIFF WBC: CPT | Performed by: EMERGENCY MEDICINE

## 2020-11-30 PROCEDURE — 83690 ASSAY OF LIPASE: CPT | Performed by: EMERGENCY MEDICINE

## 2020-11-30 PROCEDURE — 99285 EMERGENCY DEPT VISIT HI MDM: CPT | Mod: 25 | Performed by: EMERGENCY MEDICINE

## 2020-11-30 PROCEDURE — 96374 THER/PROPH/DIAG INJ IV PUSH: CPT | Performed by: EMERGENCY MEDICINE

## 2020-11-30 PROCEDURE — 99285 EMERGENCY DEPT VISIT HI MDM: CPT | Performed by: EMERGENCY MEDICINE

## 2020-11-30 PROCEDURE — 84703 CHORIONIC GONADOTROPIN ASSAY: CPT | Performed by: EMERGENCY MEDICINE

## 2020-11-30 PROCEDURE — 250N000013 HC RX MED GY IP 250 OP 250 PS 637: Performed by: EMERGENCY MEDICINE

## 2020-11-30 RX ORDER — CEPHALEXIN 500 MG/1
500 CAPSULE ORAL 2 TIMES DAILY
Qty: 10 CAPSULE | Refills: 0 | Status: SHIPPED | OUTPATIENT
Start: 2020-11-30 | End: 2020-12-05

## 2020-11-30 RX ORDER — CYCLOBENZAPRINE HCL 10 MG
10 TABLET ORAL 3 TIMES DAILY PRN
Qty: 20 TABLET | Refills: 0 | Status: SHIPPED | OUTPATIENT
Start: 2020-11-30 | End: 2020-12-07

## 2020-11-30 RX ORDER — CEPHALEXIN 500 MG/1
500 CAPSULE ORAL ONCE
Status: COMPLETED | OUTPATIENT
Start: 2020-11-30 | End: 2020-11-30

## 2020-11-30 RX ORDER — KETOROLAC TROMETHAMINE 15 MG/ML
15 INJECTION, SOLUTION INTRAMUSCULAR; INTRAVENOUS ONCE
Status: COMPLETED | OUTPATIENT
Start: 2020-11-30 | End: 2020-11-30

## 2020-11-30 RX ORDER — CYCLOBENZAPRINE HCL 10 MG
10 TABLET ORAL ONCE
Status: COMPLETED | OUTPATIENT
Start: 2020-11-30 | End: 2020-11-30

## 2020-11-30 RX ADMIN — CEPHALEXIN 500 MG: 500 CAPSULE ORAL at 19:29

## 2020-11-30 RX ADMIN — KETOROLAC TROMETHAMINE 15 MG: 15 INJECTION, SOLUTION INTRAMUSCULAR; INTRAVENOUS at 19:29

## 2020-11-30 RX ADMIN — CYCLOBENZAPRINE 10 MG: 10 TABLET, FILM COATED ORAL at 19:28

## 2020-11-30 NOTE — ED AVS SNAPSHOT
[facility]  [address]  [place] [address]  Phone: [phone]  Fax: [phone]                                    Nancy Rinaldi   MRN: [id number]    Department: [facility]   Date of Visit: 11/30/2020           After Visit Summary Signature Page    I have received my discharge instructions, and my questions have been answered. I have discussed any challenges I see with this plan with the nurse or doctor.    ..........................................................................................................................................  Patient/Patient Representative Signature      ..........................................................................................................................................  Patient Representative Print Name and Relationship to Patient    ..................................................               ................................................  Date                                   Time    ..........................................................................................................................................  Reviewed by Signature/Title    ...................................................              ..............................................  Date                                               Time          22EPIC Rev 08/18

## 2020-11-30 NOTE — ED PROVIDER NOTES
[facility] EMERGENCY DEPARTMENT ([facility])     November 30, 2020  History     Chief Complaint   Patient presents with     Fall     Fell 2 weeks ago, slipped and landed on back, denies hitting her head. Back pain is increasing.      Dysuria     Burning with urination x1 week.      FERNANDA Rinaldi is a 60 year old female with history of diabetes who presents the ED today complaining of dysuria and a fall.  Patient reports she tripped and fell backwards onto her back 2 weeks ago, but has not come in because she is afraid of COVID-19, and has subsequently been tolerating the pain. She reports she was walking and slipped and fell. No loss of consciousness. She did not strike her head.  Patient is complaining of pain bilaterally in her lower back and hips. No radiation to the legs.  Patient denies any lightheadedness or dizziness before the fall.  Patient reports she has been able to ambulate after the fall, but with pain.  She reports she uses a cane to ambulate.  Patient reports she is taking Tylenol for her pain, with her last use being this morning.  Patient denies any previous back injuries or surgeries.  Patient reports that when she lies down, the pain is worse.  Patient is also complaining of dysuria for the past week.  She denies any frequency, urgency, or hematuria.  Patient reports that she has diabetes, but that her blood sugars are controlled by injections and diet.  Additionally, patient denies abdominal pain, history of kidney stones, hematuria, nausea, vomiting, diarrhea, fevers, chills, chest pain, shortness of breath, cough, numbness, tingling, saddle anesthesia, bowel or bladder incontinence, leg weakness, pain in the legs, neck, or arms, or any recent COVID-19 exposure.    [employer]  was used for the history, physical exam and to discuss the results and plan.    I have reviewed the Medications, Allergies, Past Medical and Surgical History, and Social History in the Epic  system.  PAST MEDICAL HISTORY: No past medical history on file.    PAST SURGICAL HISTORY: No past surgical history on file.    Past medical history, past surgical history, medications, and allergies were reviewed with the patient. Additional pertinent items: None    FAMILY HISTORY: No family history on file.    SOCIAL HISTORY:   Social History     Tobacco Use     Smoking status: Not on file   Substance Use Topics     Alcohol use: Not on file     Social history was reviewed with the patient. Additional pertinent items: None      Discharge Medication List as of 12/1/2020 12:18 AM      START taking these medications    Details   cephALEXin (KEFLEX) 500 MG capsule Take 1 capsule (500 mg) by mouth 2 times daily for 5 days, Disp-10 capsule, R-0, Local Print      cyclobenzaprine (FLEXERIL) 10 MG tablet Take 1 tablet (10 mg) by mouth 3 times daily as needed for muscle spasms, Disp-20 tablet, R-0, Local Print              No Known Allergies     Review of Systems  A complete review of systems was performed with pertinent positives and negatives noted in the HPI, and all other systems negative.    Physical Exam   BP: (!) 142/62  Pulse: 80  Temp: 99.2  F (37.3  C)  Resp: 18  Weight: 83.9 kg (185 lb)  SpO2: 100 %      Physical Exam  Vitals signs reviewed.   Constitutional:       General: She is not in acute distress.     Appearance: She is well-developed.   HENT:      Head: Normocephalic and atraumatic.      Comments: No scalp hematoma. No eaton sign or raccoon eyes. No facial trauma.      Mouth/Throat:      Mouth: Mucous membranes are moist.   Eyes:      Extraocular Movements: Extraocular movements intact.      Conjunctiva/sclera: Conjunctivae normal.      Pupils: Pupils are equal, round, and reactive to light.   Neck:      Musculoskeletal: Normal range of motion and neck supple. No neck rigidity or muscular tenderness.      Comments: No midline cervical spine tenderness.   Cardiovascular:      Rate and Rhythm: Normal rate and  regular rhythm.      Pulses: Normal pulses.      Heart sounds: Normal heart sounds. No murmur.   Pulmonary:      Effort: Pulmonary effort is normal. No respiratory distress.      Breath sounds: Normal breath sounds. No wheezing or rales.   Chest:      Chest wall: No tenderness.   Abdominal:      General: Bowel sounds are normal. There is no distension.      Palpations: Abdomen is soft. There is no mass.      Tenderness: There is no abdominal tenderness. There is no right CVA tenderness, left CVA tenderness, guarding or rebound.   Musculoskeletal: Normal range of motion.         General: No swelling.      Comments: Muscular tenderness in the paraspinous muscles in the lumbar spine bilaterally. Mild midline lumbar spine tenderness. No midline thoracic tenderness. Reports some mild pain in the back with straight leg raise bilaterally. Pelvis is stable and non tender to compression. No tenderness in the femurs bilaterally. No tenderness in the lower legs or feet bilaterally. No pain with log roll of the lower extremities bilaterally. No tenderness in the upper extremities. Compartments are soft and compressible. 2+ radial, DP, PT pulses bilaterally.    Skin:     General: Skin is warm and dry.      Capillary Refill: Capillary refill takes less than 2 seconds.      Findings: No rash.   Neurological:      General: No focal deficit present.      Mental Status: She is alert and oriented to person, place, and time.      GCS: GCS eye subscore is 4. GCS verbal subscore is 5. GCS motor subscore is 6.      Cranial Nerves: No cranial nerve deficit.      Sensory: No sensory deficit.      Motor: No weakness.      Comments: 5-5 strength in the upper extremities bilaterally.  5-5 strength with hip flexion/extension, knee flexion/extension, and dorsiflexion and plantarflexion bilaterally.  Sensation intact to light touch in all 4 extremities bilaterally including all dermatomes of the lower extremities.  No saddle anesthesia.    Psychiatric:         Mood and Affect: Mood normal.         ED Course   6:32 PM  The patient was seen and examined by Jamila Modi MD in Room ED19.        Procedures               Labs Ordered and Resulted from Time of ED Arrival Up to the Time of Departure from the ED   ROUTINE UA WITH MICROSCOPIC - Abnormal; Notable for the following components:       Result Value    Leukocyte Esterase Urine Small (*)     WBC Urine 6 (*)     Bacteria Urine Few (*)     All other components within normal limits   CBC WITH PLATELETS DIFFERENTIAL - Abnormal; Notable for the following components:    RBC Count 3.73 (*)     Hemoglobin 10.5 (*)     MCHC 28.7 (*)     RDW 18.9 (*)     Platelet Count 470 (*)     Nucleated RBCs 3 (*)     All other components within normal limits   COMPREHENSIVE METABOLIC PANEL - Abnormal; Notable for the following components:    Creatinine 0.50 (*)     All other components within normal limits   LIPASE   HCG QUALITATIVE     Lumbar spine CT w/o contrast   Final Result   IMPRESSION:   1. No evidence for fracture or any posterior malalignment.   2. Multilevel degenerative disc and facet disease with mild central   canal stenosis at L2-L3, L3-L4, and L4-L5. There is also arthritic   fusion of the L5-S1 disc level.      OSCAR PRETTY MD      XR Pelvis and Hip Bilateral 2 Views   Final Result   IMPRESSION: Negative exam.      VAZQUEZ GUSMAN MD          Medications   cyclobenzaprine (FLEXERIL) tablet 10 mg (10 mg Oral Given 11/30/20 1928)   ketorolac (TORADOL) injection 15 mg (15 mg Intravenous Given 11/30/20 1929)   cephALEXin (KEFLEX) capsule 500 mg (500 mg Oral Given 11/30/20 1929)             Assessments & Plan (with Medical Decision Making)   Patient presents with back pain after mechanical fall 2 weeks ago.  She is also complaining of some dysuria over the past week.  She has no abdominal pain or tenderness on exam and no CVA tenderness to suggest any concern for pyelonephritis at this time.  She does have  some paraspinous muscle tenderness in the lumbar spine area with some mild midline back tenderness.  She has no focal neurologic deficits and no sign of spinal cord compression on exam.  She also reports some pain in her hips but does not have any bony tenderness of the pelvis or her lower extremities.  No pain with logroll of the lower extremities bilaterally.  With her age and mild midline tenderness we did feel that she warranted imaging with a lumbar spine CT to evaluate for any evidence of fracture.  Also suspect potential of degenerative disc disease or herniated disc causing her pain.  Do not feel she warrants emergent MRI as she has no spinal compression signs and no neurologic deficit.  She has not had any loss of bowel or bladder control.  Also performed an x-ray of the pelvis and hips bilaterally with her pain in that area as well in the fall.  She has no tenderness to the femurs bilaterally.  Laboratory studies were obtained.  Urinalysis does reveal 6 white blood cells with less than 1 red blood cell.  Urine culture was sent.  She has small leukocyte esterase.  With her symptoms we will treat her with Keflex at this time awaiting culture results.  She was given a dose of oral Keflex here.  She was also given Toradol and Flexeril for her back pain and was feeling much improved with this.  [place] kidney stone would be very unlikely as she has no hematuria no abdominal or flank pain.  Back pain is very muscular in nature.  CBC reveals a normal white blood cell count of 6.8.  Hemoglobin at baseline of 10.5.  CMP is unremarkable.  Vital signs were within normal limits here and she was afebrile.    X-ray of the pelvis and hips were unremarkable.  CT of the lumbar spine did not reveal any evidence of fracture or posterior malalignment.  There was multilevel degenerative disc and facet disease with mild central canal stenosis at L2-L3 L3-L4 and L4-L5 and there is also arthritic fusion of the L5-S1 disc level.   This is likely the cause of her pain.  She was able to ambulate here in the department without difficulty.  She was given a prescription for Keflex for 5 days for potential of UTI with her symptoms.  She was also given a short prescription of Flexeril as she did feel this helped her pain significantly.  She was advised to follow-up closely with her primary care provider and was also given the [facility] number.  We discussed if she was not improving with her back pain she may potentially warrant an outpatient MRI or physical therapy referral.  She was also advised she could take Tylenol or ibuprofen for her back pain as well as the Flexeril.  She was cautioned on the side effects of Flexeril as well.  She was given indications for return to the emergency department.  This was done with the Ankit .  She voiced understanding and was comfortable with this plan.  She was discharged home in stable condition.    I have reviewed the nursing notes.    I have reviewed the findings, diagnosis, plan and need for follow up with the patient.    Discharge Medication List as of 12/1/2020 12:18 AM      START taking these medications    Details   cephALEXin (KEFLEX) 500 MG capsule Take 1 capsule (500 mg) by mouth 2 times daily for 5 days, Disp-10 capsule, R-0, Local Print      cyclobenzaprine (FLEXERIL) 10 MG tablet Take 1 tablet (10 mg) by mouth 3 times daily as needed for muscle spasms, Disp-20 tablet, R-0, Local Print             Final diagnoses:   Acute bilateral low back pain without sciatica   Acute cystitis without hematuria   I, Flaco Sweeney, am serving as a trained medical scribe to document services personally performed by Jamila Modi MD, based on the provider's statements to me.     I, Jamila Modi MD, was physically present and have reviewed and verified the accuracy of this note documented by Flaco Sweeney.      11/30/2020   [facility] EMERGENCY DEPARTMENT     Rian  Jamila MORA MD  12/02/20 [id number]

## 2020-12-01 LAB
BACTERIA SPEC CULT: NO GROWTH
Lab: NORMAL
SPECIMEN SOURCE: NORMAL

## 2021-07-22 ENCOUNTER — LAB REQUISITION (OUTPATIENT)
Dept: LAB | Facility: CLINIC | Age: 61
End: 2021-07-22
Payer: COMMERCIAL

## 2021-07-22 DIAGNOSIS — R60.0 LOCALIZED EDEMA: ICD-10-CM

## 2021-07-22 DIAGNOSIS — M54.42 LUMBAGO WITH SCIATICA, LEFT SIDE: ICD-10-CM

## 2021-07-22 DIAGNOSIS — G89.29 OTHER CHRONIC PAIN: ICD-10-CM

## 2021-07-22 DIAGNOSIS — E11.9 TYPE 2 DIABETES MELLITUS WITHOUT COMPLICATIONS (H): ICD-10-CM

## 2021-07-22 DIAGNOSIS — M54.41 LUMBAGO WITH SCIATICA, RIGHT SIDE: ICD-10-CM

## 2021-07-22 PROCEDURE — 87086 URINE CULTURE/COLONY COUNT: CPT | Mod: ORL | Performed by: FAMILY MEDICINE

## 2021-07-24 LAB — BACTERIA UR CULT: ABNORMAL

## 2021-07-29 ENCOUNTER — LAB REQUISITION (OUTPATIENT)
Dept: LAB | Facility: CLINIC | Age: 61
End: 2021-07-29
Payer: COMMERCIAL

## 2021-07-29 DIAGNOSIS — K59.04 CHRONIC IDIOPATHIC CONSTIPATION: ICD-10-CM

## 2021-07-29 DIAGNOSIS — R60.0 LOCALIZED EDEMA: ICD-10-CM

## 2021-07-29 DIAGNOSIS — M54.31 SCIATICA, RIGHT SIDE: ICD-10-CM

## 2021-07-29 LAB
BASOPHILS # BLD MANUAL: 0 10E3/UL (ref 0–0.2)
BASOPHILS NFR BLD MANUAL: 0 %
EOSINOPHIL # BLD MANUAL: NORMAL 10*3/UL
EOSINOPHIL NFR BLD MANUAL: 1 %
LYMPHOCYTES # BLD MANUAL: NORMAL 10*3/UL
LYMPHOCYTES NFR BLD MANUAL: 39 %
MONOCYTES # BLD MANUAL: NORMAL 10*3/UL
MONOCYTES NFR BLD MANUAL: 6 %
NEUTROPHILS # BLD MANUAL: NORMAL 10*3/UL
NEUTROPHILS NFR BLD MANUAL: 54 %
PATH REV: NORMAL
PLAT MORPH BLD: NORMAL
RBC MORPH BLD: NORMAL
TSH SERPL DL<=0.005 MIU/L-ACNC: 0.55 MU/L (ref 0.4–4)

## 2021-07-29 PROCEDURE — 84443 ASSAY THYROID STIM HORMONE: CPT | Mod: ORL | Performed by: FAMILY MEDICINE

## 2021-08-13 ENCOUNTER — HOSPITAL ENCOUNTER (OUTPATIENT)
Dept: MRI IMAGING | Facility: CLINIC | Age: 61
Discharge: HOME | End: 2021-08-13
Attending: FAMILY MEDICINE | Admitting: FAMILY MEDICINE
Payer: COMMERCIAL

## 2021-08-13 DIAGNOSIS — M54.31 SCIATICA OF RIGHT SIDE: ICD-10-CM

## 2021-08-13 PROCEDURE — 72148 MRI LUMBAR SPINE W/O DYE: CPT

## 2021-08-13 PROCEDURE — 72148 MRI LUMBAR SPINE W/O DYE: CPT | Mod: 26 | Performed by: RADIOLOGY

## 2021-08-16 LAB — RADIOLOGIST FLAGS: NORMAL

## 2021-08-19 ENCOUNTER — LAB REQUISITION (OUTPATIENT)
Dept: LAB | Facility: CLINIC | Age: 61
End: 2021-08-19
Payer: COMMERCIAL

## 2021-08-19 DIAGNOSIS — D64.9 ANEMIA, UNSPECIFIED: ICD-10-CM

## 2021-08-19 DIAGNOSIS — M54.31 SCIATICA, RIGHT SIDE: ICD-10-CM

## 2021-08-19 LAB
BASOPHILS # BLD AUTO: 0 10E3/UL (ref 0–0.2)
BASOPHILS NFR BLD AUTO: 1 %
ELLIPTOCYTES BLD QL SMEAR: SLIGHT
EOSINOPHIL # BLD AUTO: 0.1 10E3/UL (ref 0–0.7)
EOSINOPHIL NFR BLD AUTO: 1 %
ERYTHROCYTE [DISTWIDTH] IN BLOOD BY AUTOMATED COUNT: 18.8 % (ref 10–15)
FERRITIN SERPL-MCNC: 73 NG/ML (ref 8–252)
FOLATE SERPL-MCNC: 8.2 NG/ML
HCT VFR BLD AUTO: 34 % (ref 35–47)
HGB BLD-MCNC: 10.5 G/DL (ref 11.7–15.7)
IMM GRANULOCYTES # BLD: 0.2 10E3/UL
IMM GRANULOCYTES NFR BLD: 2 %
LYMPHOCYTES # BLD AUTO: 2.1 10E3/UL (ref 0.8–5.3)
LYMPHOCYTES NFR BLD AUTO: 28 %
MCH RBC QN AUTO: 28.5 PG (ref 26.5–33)
MCHC RBC AUTO-ENTMCNC: 30.9 G/DL (ref 31.5–36.5)
MCV RBC AUTO: 92 FL (ref 78–100)
MONOCYTES # BLD AUTO: 0.6 10E3/UL (ref 0–1.3)
MONOCYTES NFR BLD AUTO: 7 %
NEUTROPHILS # BLD AUTO: 4.6 10E3/UL (ref 1.6–8.3)
NEUTROPHILS NFR BLD AUTO: 61 %
NRBC # BLD AUTO: 0.2 10E3/UL
NRBC BLD AUTO-RTO: 2 /100
PLAT MORPH BLD: ABNORMAL
PLATELET # BLD AUTO: 546 10E3/UL (ref 150–450)
POLYCHROMASIA BLD QL SMEAR: SLIGHT
RBC # BLD AUTO: 3.68 10E6/UL (ref 3.8–5.2)
RBC MORPH BLD: ABNORMAL
RETICS # AUTO: 0.08 10E6/UL (ref 0.03–0.1)
RETICS/RBC NFR AUTO: 2.2 % (ref 0.5–2)
VIT B12 SERPL-MCNC: 505 PG/ML (ref 193–986)
WBC # BLD AUTO: 7.5 10E3/UL (ref 4–11)

## 2021-08-19 PROCEDURE — 82746 ASSAY OF FOLIC ACID SERUM: CPT | Mod: ORL | Performed by: FAMILY MEDICINE

## 2021-08-19 PROCEDURE — 82728 ASSAY OF FERRITIN: CPT | Mod: ORL | Performed by: FAMILY MEDICINE

## 2021-08-19 PROCEDURE — 85045 AUTOMATED RETICULOCYTE COUNT: CPT | Mod: ORL | Performed by: FAMILY MEDICINE

## 2021-08-19 PROCEDURE — 85025 COMPLETE CBC W/AUTO DIFF WBC: CPT | Mod: ORL | Performed by: FAMILY MEDICINE

## 2021-08-19 PROCEDURE — 82607 VITAMIN B-12: CPT | Mod: ORL | Performed by: FAMILY MEDICINE

## 2021-08-20 ENCOUNTER — LAB REQUISITION (OUTPATIENT)
Dept: LAB | Facility: CLINIC | Age: 61
End: 2021-08-20
Payer: COMMERCIAL

## 2021-08-20 ENCOUNTER — TRANSCRIBE ORDERS (OUTPATIENT)
Dept: OTHER | Age: 61
End: 2021-08-20

## 2021-08-20 DIAGNOSIS — M54.31 SCIATICA, RIGHT SIDE: Primary | ICD-10-CM

## 2021-08-20 DIAGNOSIS — M54.31 SCIATICA, RIGHT SIDE: ICD-10-CM

## 2021-08-20 DIAGNOSIS — M54.16 LUMBAR RADICULOPATHY: ICD-10-CM

## 2021-08-20 DIAGNOSIS — D64.9 ANEMIA, UNSPECIFIED: ICD-10-CM

## 2021-08-23 LAB
PATH REPORT.COMMENTS IMP SPEC: NORMAL
PATH REPORT.FINAL DX SPEC: NORMAL
PATH REPORT.MICROSCOPIC SPEC OTHER STN: NORMAL
PATH REPORT.MICROSCOPIC SPEC OTHER STN: NORMAL
PATH REPORT.RELEVANT HX SPEC: NORMAL

## 2021-08-23 PROCEDURE — 85060 BLOOD SMEAR INTERPRETATION: CPT | Performed by: PATHOLOGY

## 2021-08-26 ENCOUNTER — TRANSCRIBE ORDERS (OUTPATIENT)
Dept: OTHER | Age: 61
End: 2021-08-26

## 2021-08-26 DIAGNOSIS — D75.839 THROMBOCYTOSIS: ICD-10-CM

## 2021-08-26 DIAGNOSIS — D64.9 ANEMIA: Primary | ICD-10-CM

## 2021-09-03 NOTE — PROGRESS NOTES
RECORDS STATUS - ALL OTHER DIAGNOSIS      RECORDS RECEIVED FROM: [facility]   DATE RECEIVED: 9/28/2021   NOTES STATUS DETAILS   OFFICE NOTE from referring provider Complete Refer by Dr Mery Fernandez. # [phone]   OFFICE NOTE from medical oncologist Complete 8/30/2011 Oncology Visit- Thrombocytosis    DISCHARGE SUMMARY from hospital N/A    DISCHARGE REPORT from the ER     OPERATIVE REPORT N/A    MEDICATION LIST Complete Epic    CLINICAL TRIAL TREATMENTS TO DATE     LABS     PATHOLOGY REPORTS     ANYTHING RELATED TO DIAGNOSIS Complete Lab last updated on 8/20/2021    GENONOMIC TESTING     TYPE:     IMAGING (NEED IMAGES & REPORT)     CT SCANS     MRI     MAMMO     ULTRASOUND     PET

## 2021-09-27 ENCOUNTER — TRANSCRIBE ORDERS (OUTPATIENT)
Dept: OTHER | Age: 61
End: 2021-09-27

## 2021-09-27 DIAGNOSIS — E11.9 TYPE 2 DIABETES MELLITUS (H): Primary | ICD-10-CM

## 2021-09-28 ENCOUNTER — PRE VISIT (OUTPATIENT)
Dept: ONCOLOGY | Facility: CLINIC | Age: 61
End: 2021-09-28

## 2021-09-28 ENCOUNTER — VIRTUAL VISIT (OUTPATIENT)
Dept: ONCOLOGY | Facility: CLINIC | Age: 61
End: 2021-09-28
Payer: COMMERCIAL

## 2021-09-28 DIAGNOSIS — R10.2 PELVIC PAIN IN FEMALE: ICD-10-CM

## 2021-09-28 DIAGNOSIS — D64.9 ANEMIA, UNSPECIFIED TYPE: ICD-10-CM

## 2021-09-28 DIAGNOSIS — M79.89 SWELLING OF LOWER EXTREMITY: ICD-10-CM

## 2021-09-28 DIAGNOSIS — R10.84 ABDOMINAL PAIN, GENERALIZED: ICD-10-CM

## 2021-09-28 DIAGNOSIS — D75.839 THROMBOCYTOSIS: ICD-10-CM

## 2021-09-28 DIAGNOSIS — D72.89 LEFT-SHIFTED WHITE BLOOD CELLS: Primary | ICD-10-CM

## 2021-09-28 PROCEDURE — 99204 OFFICE O/P NEW MOD 45 MIN: CPT | Mod: 95 | Performed by: INTERNAL MEDICINE

## 2021-09-28 RX ORDER — METHOCARBAMOL 500 MG/1
TABLET, FILM COATED ORAL
COMMUNITY
Start: 2021-09-22

## 2021-09-28 RX ORDER — CAPSAICIN 0.025 %
CREAM (GRAM) TOPICAL
COMMUNITY
Start: 2020-09-11

## 2021-09-28 RX ORDER — LANCING DEVICE/LANCETS
1 KIT MISCELLANEOUS
COMMUNITY
Start: 2020-09-21

## 2021-09-28 RX ORDER — LANCETS
EACH MISCELLANEOUS
COMMUNITY
Start: 2021-09-21 | End: 2021-10-28

## 2021-09-28 RX ORDER — PROPRANOLOL HYDROCHLORIDE 160 MG/1
CAPSULE, EXTENDED RELEASE ORAL
COMMUNITY
Start: 2021-09-23

## 2021-09-28 RX ORDER — ATORVASTATIN CALCIUM 40 MG/1
40 TABLET, FILM COATED ORAL DAILY
COMMUNITY
Start: 2021-05-12 | End: 2021-10-28

## 2021-09-28 RX ORDER — CYCLOBENZAPRINE HCL 10 MG
TABLET ORAL
COMMUNITY

## 2021-09-28 RX ORDER — VENLAFAXINE HYDROCHLORIDE 75 MG/1
CAPSULE, EXTENDED RELEASE ORAL
COMMUNITY
Start: 2021-07-29

## 2021-09-28 NOTE — PROGRESS NOTES
Nancy is a 61 year old who is being evaluated via a billable video visit.      How would you like to obtain your AVS? Mail a copy  If the video visit is dropped, the invitation should be resent by: Text to cell phone: [phone]   Will anyone else be joining your video visit? Yes, Son and interpretor.     Video Start Time: 2:17 PM  Video-Visit Details    Type of service:  Video Visit    Video End Time:2:41 PM    Originating Location (pt. Location): Home    Distant Location (provider location):  [facility]     Platform used for Video Visit: [employer]    Hematology initial visit:  Date on this visit: 9/28/2021    Nancy Rodríguez  is referred by Dr.Karen Fernandez for a hematology consultation. She requires evaluation for thrombocytosis/left shift WBC and blast on smear.    Primary Physician: Tasha Nick     History Of Present Illness:  Ms. Rodríguez is a 61 year old female who presents with thrombocytosis, anemia, left shifted WBC and blasts on smear.    This is a video visit. A [facility] Interpretor # [id number] is available on the phone.     Looking back she has had thrombocytosis and anemia for a number of years.  On 8/20/2021, peripheral blood smear showed left shifted WBC with blast-like cells in addition to anemia and thrombocytosis.      She feels pain in the pelvic area and back pain. Sometimes feels leg are swollen. Right ankle is more swollen. It is not more swollen. This is going on for 4 months. She denies B symptoms. No abnormal bleeding, erythromelalgia, dyspnea. Denies significant dyspnea. Feels fatigued. No nausea, vomiting, diarrhea or constipation. Last mammogram was few years ago.      ROS:  A comprehensive ROS was otherwise neg      Past Medical/Surgical History:  Past Medical History:   Diagnosis Date     Diabetes (H)      Headache      Nonsenile cataract      Past Surgical History:   Procedure Laterality Date     ANKLE SURGERY      left     Allergies:  Allergies as of 09/28/2021      (No Known Allergies)     Current Medications:  Current Outpatient Medications   Medication Sig Dispense Refill     ASPIRIN PO Take 81 mg by mouth daily       atorvastatin (LIPITOR) 40 MG tablet Take 40 mg by mouth daily       blood glucose (ACCU-CHEK FASTCLIX) lancing device 1 kit by Other route       blood glucose monitoring (ACCU-CHEK FASTCLIX) lancets CHECK BLOOD SUGAR TWICE DAILY.       Calcium Carb-Cholecalciferol (CALCIUM 600 + D PO) Take 1 tablet by mouth daily       capsaicin (ZOSTRIX) 0.025 % external cream capsaicin 0.025 % topical cream   BALDEMAR 1 APPLICATION AA TID PRN P       cholecalciferol (VITAMIN D3) 125 mcg (5000 units) capsule TAKE 1 CAPSULE BY MOUTH ONCE DAILY       cyclobenzaprine (FLEXERIL) 10 MG tablet cyclobenzaprine 10 mg tablet   TK 1 T PO TID PRF MUSCLE SPASM       DOCUSATE SODIUM PO Take 100 mg by mouth daily       IBUPROFEN PO Take 600 mg by mouth as needed for moderate pain       magnesium hydroxide (MILK OF MAGNESIA) 400 MG/5ML suspension Take 800 mg by mouth       methocarbamol (ROBAXIN) 500 MG tablet TAKE 1 TABLET BY MOUTH TWICE DAILY AS NEEDED       OMEPRAZOLE PO Take 20 mg by mouth every morning       propranolol ER (INDERAL LA) 160 MG 24 hr capsule        QUETIAPINE FUMARATE PO Take 50 mg by mouth       UNKNOWN TO PATIENT Pt states that she takes many medications, but is uncertain what they are for and what dosages they are.       venlafaxine (EFFEXOR-XR) 75 MG 24 hr capsule venlafaxine ER 75 mg capsule,extended release 24 hr   TAKE ONE CAPSULE BY MOUTH ONCE DAILY        Family History:  Family History   Problem Relation Age of Onset     Glaucoma No family hx of      Macular Degeneration No family hx of      No family history of bleeding or clotting disorder or cancers.    Social History:  Social History     Socioeconomic History     Marital status: Single     Spouse name: Not on file     Number of children: Not on file     Years of education: Not on file     Highest education  level: Not on file   Occupational History     Not on file   Tobacco Use     Smoking status: Never Smoker     Smokeless tobacco: Never Used   Substance and Sexual Activity     Alcohol use: No     Alcohol/week: 0.0 standard drinks     Drug use: No     Sexual activity: Not on file   Other Topics Concern     Not on file   Social History Narrative     Not on file     Social Determinants of Health     Financial Resource Strain:      Difficulty of Paying Living Expenses:    Food Insecurity:      Worried About Running Out of Food in the Last Year:      Ran Out of Food in the Last Year:    Transportation Needs:      Lack of Transportation (Medical):      Lack of Transportation (Non-Medical):    Physical Activity:      Days of Exercise per Week:      Minutes of Exercise per Session:    Stress:      Feeling of Stress :    Social Connections:      Frequency of Communication with Friends and Family:      Frequency of Social Gatherings with Friends and Family:      Attends Confucianism Services:      Active Member of Clubs or Organizations:      Attends Club or Organization Meetings:      Marital Status:    Intimate Partner Violence:      Fear of Current or Ex-Partner:      Emotionally Abused:      Physically Abused:      Sexually Abused:      Physical Exam:  There were no vitals taken for this visit.     Wt Readings from Last 4 Encounters:   08/23/17 83.3 kg (183 lb 9.6 oz)   07/12/17 84.8 kg (187 lb)   08/30/11 82.8 kg (182 lb 8.7 oz)       Constitutional.  Does not seem to be in any acute distress.  Eyes.  No redness or discharge noted.  Respiratory.  Speaking in full sentences.  Breathing seems comfortable without any accessory use of muscles.    Skin.  Visualized his skin does not show any obvious rashes.  Musculoskeletal.  Range of motion for visualized areas is intact.  Neurological.  Alert and oriented x3.  Psychiatric.  Mood, mentation and affect are normal.  Decision making capacity is intact.      The rest of a  comprehensive physical examination is deferred due to [facility] Emergency video visit restrictions.    Laboratory/Imaging Studies      Reviewed  She has had thrombocytosis and anemia for a number of years.  On 8/19/2021, CBC showed hemoglobin 10.5.  WBC 7.5.  Platelets 546.  Peripheral blood smear showed normocytic, hypochromic anemia with mild thrombocytosis with rare hypogranular platelets.  A small number of circulating normoblasts and mild left shift in neutrophils series and occasional blast-like cells.  Ferritin 73, folate 8.2, B12 505.    ASSESSMENT/PLAN:    Thrombocytosis/anemia/left shifted WBC/occasional blast-like cells.    These findings are concerning for an underlying bone marrow pathology and I would like to repeat peripheral blood smear and also check CMP, LDH, uric acid and peripheral blood flow cytometry.  I would also like to check FISH for BCR/ABL and NGS panel for myeloproliferative neoplasms.    In addition to the above we will also check complete iron studies and repeat B12, iron and and folate and erythropoietin levels.      We will make the determination to do a Bone marrow biopsy after reviewing the results of the above-mentioned tests.    Pelvic/back pain and extreme fatigue- due to the findings on the smear and these symptoms, I recommend checking CT CAP to rule out underlying malignancy.    Leg swelling- R>L. Will check US to rule out DVT.      See me back in 3 weeks to see me in-person.    Her questions were answered to her satisfaction.  She is agreeable and comfortable with the plan.    Eddie Duong MD

## 2021-09-28 NOTE — LETTER
9/28/2021         RE: Nancy Rodríguez  [address]  [address]  [facility] [address]        Dear Colleague,    Thank you for referring your patient, Nancy Rodríguez, to the [facility]. Please see a copy of my visit note below.    Nancy is a 61 year old who is being evaluated via a billable video visit.      How would you like to obtain your AVS? Mail a copy  If the video visit is dropped, the invitation should be resent by: Text to cell phone: [phone]   Will anyone else be joining your video visit? Yes, Son and interpretor.     Video Start Time: 2:17 PM  Video-Visit Details    Type of service:  Video Visit    Video End Time:2:41 PM    Originating Location (pt. Location): Home    Distant Location (provider location):  [facility]     Platform used for Video Visit: Yodlee    Hematology initial visit:  Date on this visit: 9/28/2021    Nancy Rodríguez  is referred by Dr.Karen Fernandez for a hematology consultation. She requires evaluation for thrombocytosis/left shift WBC and blast on smear.    Primary Physician: Tasha Nick     History Of Present Illness:  Ms. Rodríguez is a 61 year old female who presents with thrombocytosis, anemia, left shifted WBC and blasts on smear.    This is a video visit. A [facility] Interpretor # [id number] is available on the phone.     Looking back she has had thrombocytosis and anemia for a number of years.  On 8/20/2021, peripheral blood smear showed left shifted WBC with blast-like cells in addition to anemia and thrombocytosis.      She feels pain in the pelvic area and back pain. Sometimes feels leg are swollen. Right ankle is more swollen. It is not more swollen. This is going on for 4 months. She denies B symptoms. No abnormal bleeding, erythromelalgia, dyspnea. Denies significant dyspnea. Feels fatigued. No nausea, vomiting, diarrhea or constipation. Last mammogram was few years ago.      ROS:  A comprehensive ROS was  otherwise neg      Past Medical/Surgical History:  Past Medical History:   Diagnosis Date     Diabetes (H)      Headache      Nonsenile cataract      Past Surgical History:   Procedure Laterality Date     ANKLE SURGERY      left     Allergies:  Allergies as of 09/28/2021     (No Known Allergies)     Current Medications:  Current Outpatient Medications   Medication Sig Dispense Refill     ASPIRIN PO Take 81 mg by mouth daily       atorvastatin (LIPITOR) 40 MG tablet Take 40 mg by mouth daily       blood glucose (ACCU-CHEK FASTCLIX) lancing device 1 kit by Other route       blood glucose monitoring (ACCU-CHEK FASTCLIX) lancets CHECK BLOOD SUGAR TWICE DAILY.       Calcium Carb-Cholecalciferol (CALCIUM 600 + D PO) Take 1 tablet by mouth daily       capsaicin (ZOSTRIX) 0.025 % external cream capsaicin 0.025 % topical cream   BALDEMAR 1 APPLICATION AA TID PRN P       cholecalciferol (VITAMIN D3) 125 mcg (5000 units) capsule TAKE 1 CAPSULE BY MOUTH ONCE DAILY       cyclobenzaprine (FLEXERIL) 10 MG tablet cyclobenzaprine 10 mg tablet   TK 1 T PO TID PRF MUSCLE SPASM       DOCUSATE SODIUM PO Take 100 mg by mouth daily       IBUPROFEN PO Take 600 mg by mouth as needed for moderate pain       magnesium hydroxide (MILK OF MAGNESIA) 400 MG/5ML suspension Take 800 mg by mouth       methocarbamol (ROBAXIN) 500 MG tablet TAKE 1 TABLET BY MOUTH TWICE DAILY AS NEEDED       OMEPRAZOLE PO Take 20 mg by mouth every morning       propranolol ER (INDERAL LA) 160 MG 24 hr capsule        QUETIAPINE FUMARATE PO Take 50 mg by mouth       UNKNOWN TO PATIENT Pt states that she takes many medications, but is uncertain what they are for and what dosages they are.       venlafaxine (EFFEXOR-XR) 75 MG 24 hr capsule venlafaxine ER 75 mg capsule,extended release 24 hr   TAKE ONE CAPSULE BY MOUTH ONCE DAILY        Family History:  Family History   Problem Relation Age of Onset     Glaucoma No family hx of      Macular Degeneration No family hx of      No  family history of bleeding or clotting disorder or cancers.    Social History:  Social History     Socioeconomic History     Marital status: Single     Spouse name: Not on file     Number of children: Not on file     Years of education: Not on file     Highest education level: Not on file   Occupational History     Not on file   Tobacco Use     Smoking status: Never Smoker     Smokeless tobacco: Never Used   Substance and Sexual Activity     Alcohol use: No     Alcohol/week: 0.0 standard drinks     Drug use: No     Sexual activity: Not on file   Other Topics Concern     Not on file   Social History Narrative     Not on file     Social Determinants of Health     Financial Resource Strain:      Difficulty of Paying Living Expenses:    Food Insecurity:      Worried About Running Out of Food in the Last Year:      Ran Out of Food in the Last Year:    Transportation Needs:      Lack of Transportation (Medical):      Lack of Transportation (Non-Medical):    Physical Activity:      Days of Exercise per Week:      Minutes of Exercise per Session:    Stress:      Feeling of Stress :    Social Connections:      Frequency of Communication with Friends and Family:      Frequency of Social Gatherings with Friends and Family:      Attends Orthodox Services:      Active Member of Clubs or Organizations:      Attends Club or Organization Meetings:      Marital Status:    Intimate Partner Violence:      Fear of Current or Ex-Partner:      Emotionally Abused:      Physically Abused:      Sexually Abused:      Physical Exam:  There were no vitals taken for this visit.     Wt Readings from Last 4 Encounters:   08/23/17 83.3 kg (183 lb 9.6 oz)   07/12/17 84.8 kg (187 lb)   08/30/11 82.8 kg (182 lb 8.7 oz)       Constitutional.  Does not seem to be in any acute distress.  Eyes.  No redness or discharge noted.  Respiratory.  Speaking in full sentences.  Breathing seems comfortable without any accessory use of muscles.    Skin.   Visualized his skin does not show any obvious rashes.  Musculoskeletal.  Range of motion for visualized areas is intact.  Neurological.  Alert and oriented x3.  Psychiatric.  Mood, mentation and affect are normal.  Decision making capacity is intact.      The rest of a comprehensive physical examination is deferred due to Public Health Emergency video visit restrictions.    Laboratory/Imaging Studies      Reviewed  She has had thrombocytosis and anemia for a number of years.  On 8/19/2021, CBC showed hemoglobin 10.5.  WBC 7.5.  Platelets 546.  Peripheral blood smear showed normocytic, hypochromic anemia with mild thrombocytosis with rare hypogranular platelets.  A small number of circulating normoblasts and mild left shift in neutrophils series and occasional blast-like cells.  Ferritin 73, folate 8.2, B12 505.    ASSESSMENT/PLAN:    Thrombocytosis/anemia/left shifted WBC/occasional blast-like cells.    These findings are concerning for an underlying bone marrow pathology and I would like to repeat peripheral blood smear and also check CMP, LDH, uric acid and peripheral blood flow cytometry.  I would also like to check FISH for BCR/ABL and NGS panel for myeloproliferative neoplasms.    In addition to the above we will also check complete iron studies and repeat B12, iron and and folate and erythropoietin levels.      We will make the determination to do a Bone marrow biopsy after reviewing the results of the above-mentioned tests.    Pelvic/back pain and extreme fatigue- due to the findings on the smear and these symptoms, I recommend checking CT CAP to rule out underlying malignancy.    Leg swelling- R>L. Will check US to rule out DVT.      See me back in 3 weeks to see me in-person.    Her questions were answered to her satisfaction.  She is agreeable and comfortable with the plan.    Eddie Duong MD            Again, thank you for allowing me to participate in the care of your patient.         Sincerely,        Eddie Duong MD

## 2021-10-01 ENCOUNTER — LAB (OUTPATIENT)
Dept: LAB | Facility: CLINIC | Age: 61
End: 2021-10-01
Attending: INTERNAL MEDICINE
Payer: COMMERCIAL

## 2021-10-01 DIAGNOSIS — D64.9 ANEMIA, UNSPECIFIED TYPE: ICD-10-CM

## 2021-10-01 DIAGNOSIS — R10.2 PELVIC PAIN IN FEMALE: ICD-10-CM

## 2021-10-01 DIAGNOSIS — D75.839 THROMBOCYTOSIS: ICD-10-CM

## 2021-10-01 DIAGNOSIS — M79.89 SWELLING OF LOWER EXTREMITY: ICD-10-CM

## 2021-10-01 DIAGNOSIS — D72.89 LEFT-SHIFTED WHITE BLOOD CELLS: ICD-10-CM

## 2021-10-01 DIAGNOSIS — R10.84 ABDOMINAL PAIN, GENERALIZED: ICD-10-CM

## 2021-10-01 LAB
ALBUMIN SERPL-MCNC: 3.7 G/DL (ref 3.4–5)
ALP SERPL-CCNC: 81 U/L (ref 40–150)
ALT SERPL W P-5'-P-CCNC: 30 U/L (ref 0–50)
ANION GAP SERPL CALCULATED.3IONS-SCNC: 5 MMOL/L (ref 3–14)
AST SERPL W P-5'-P-CCNC: 18 U/L (ref 0–45)
BASOPHILS # BLD AUTO: 0.1 10E3/UL (ref 0–0.2)
BASOPHILS NFR BLD AUTO: 1 %
BILIRUB SERPL-MCNC: 0.4 MG/DL (ref 0.2–1.3)
BUN SERPL-MCNC: 8 MG/DL (ref 7–30)
CALCIUM SERPL-MCNC: 9 MG/DL (ref 8.5–10.1)
CHLORIDE BLD-SCNC: 106 MMOL/L (ref 94–109)
CO2 SERPL-SCNC: 30 MMOL/L (ref 20–32)
CREAT SERPL-MCNC: 0.52 MG/DL (ref 0.52–1.04)
EOSINOPHIL # BLD AUTO: 0.1 10E3/UL (ref 0–0.7)
EOSINOPHIL NFR BLD AUTO: 1 %
ERYTHROCYTE [DISTWIDTH] IN BLOOD BY AUTOMATED COUNT: 19.4 % (ref 10–15)
FERRITIN SERPL-MCNC: 82 NG/ML (ref 8–252)
FOLATE SERPL-MCNC: 11.9 NG/ML
GFR SERPL CREATININE-BSD FRML MDRD: >90 ML/MIN/1.73M2
GLUCOSE BLD-MCNC: 102 MG/DL (ref 70–99)
HCT VFR BLD AUTO: 34.1 % (ref 35–47)
HGB BLD-MCNC: 10.6 G/DL (ref 11.7–15.7)
IMM GRANULOCYTES # BLD: 0.2 10E3/UL
IMM GRANULOCYTES NFR BLD: 4 %
INTERPRETATION: NORMAL
IRON SATN MFR SERPL: 29 % (ref 15–46)
IRON SERPL-MCNC: 80 UG/DL (ref 35–180)
LDH SERPL L TO P-CCNC: 473 U/L (ref 81–234)
LYMPHOCYTES # BLD AUTO: 1.8 10E3/UL (ref 0.8–5.3)
LYMPHOCYTES NFR BLD AUTO: 30 %
MCH RBC QN AUTO: 27.9 PG (ref 26.5–33)
MCHC RBC AUTO-ENTMCNC: 31.1 G/DL (ref 31.5–36.5)
MCV RBC AUTO: 90 FL (ref 78–100)
MONOCYTES # BLD AUTO: 0.6 10E3/UL (ref 0–1.3)
MONOCYTES NFR BLD AUTO: 9 %
NEUTROPHILS # BLD AUTO: 3.3 10E3/UL (ref 1.6–8.3)
NEUTROPHILS NFR BLD AUTO: 55 %
NRBC # BLD AUTO: 0.2 10E3/UL
NRBC BLD AUTO-RTO: 3 /100
PLATELET # BLD AUTO: 511 10E3/UL (ref 150–450)
POTASSIUM BLD-SCNC: 3.9 MMOL/L (ref 3.4–5.3)
PROT SERPL-MCNC: 7.4 G/DL (ref 6.8–8.8)
RBC # BLD AUTO: 3.8 10E6/UL (ref 3.8–5.2)
RETICS # AUTO: 0.07 10E6/UL (ref 0.03–0.1)
RETICS/RBC NFR AUTO: 1.9 % (ref 0.5–2)
SIGNIFICANT RESULTS: NORMAL
SODIUM SERPL-SCNC: 141 MMOL/L (ref 133–144)
SPECIMEN DESCRIPTION: NORMAL
TEST DETAILS, MDL: NORMAL
TIBC SERPL-MCNC: 279 UG/DL (ref 240–430)
URATE SERPL-MCNC: 3.8 MG/DL (ref 2.6–6)
VIT B12 SERPL-MCNC: 586 PG/ML (ref 193–986)
WBC # BLD AUTO: 6.1 10E3/UL (ref 4–11)

## 2021-10-01 PROCEDURE — 36415 COLL VENOUS BLD VENIPUNCTURE: CPT | Performed by: PATHOLOGY

## 2021-10-01 PROCEDURE — 85025 COMPLETE CBC W/AUTO DIFF WBC: CPT | Performed by: PATHOLOGY

## 2021-10-01 PROCEDURE — 83615 LACTATE (LD) (LDH) ENZYME: CPT | Performed by: PATHOLOGY

## 2021-10-01 PROCEDURE — 81403 MOPATH PROCEDURE LEVEL 4: CPT | Mod: 90 | Performed by: PATHOLOGY

## 2021-10-01 PROCEDURE — 88189 FLOWCYTOMETRY/READ 16 & >: CPT | Performed by: STUDENT IN AN ORGANIZED HEALTH CARE EDUCATION/TRAINING PROGRAM

## 2021-10-01 PROCEDURE — G0452 MOLECULAR PATHOLOGY INTERPR: HCPCS | Mod: 26 | Performed by: STUDENT IN AN ORGANIZED HEALTH CARE EDUCATION/TRAINING PROGRAM

## 2021-10-01 PROCEDURE — 82728 ASSAY OF FERRITIN: CPT | Performed by: PATHOLOGY

## 2021-10-01 PROCEDURE — 82668 ASSAY OF ERYTHROPOIETIN: CPT | Mod: 90 | Performed by: PATHOLOGY

## 2021-10-01 PROCEDURE — 88185 FLOWCYTOMETRY/TC ADD-ON: CPT | Performed by: INTERNAL MEDICINE

## 2021-10-01 PROCEDURE — 85045 AUTOMATED RETICULOCYTE COUNT: CPT | Performed by: PATHOLOGY

## 2021-10-01 PROCEDURE — 82746 ASSAY OF FOLIC ACID SERUM: CPT | Mod: 90 | Performed by: PATHOLOGY

## 2021-10-01 PROCEDURE — 81479 UNLISTED MOLECULAR PATHOLOGY: CPT | Mod: 90 | Performed by: PATHOLOGY

## 2021-10-01 PROCEDURE — 83921 ORGANIC ACID SINGLE QUANT: CPT | Mod: 90 | Performed by: PATHOLOGY

## 2021-10-01 PROCEDURE — 82607 VITAMIN B-12: CPT | Performed by: PATHOLOGY

## 2021-10-01 PROCEDURE — 88184 FLOWCYTOMETRY/ TC 1 MARKER: CPT | Performed by: INTERNAL MEDICINE

## 2021-10-01 PROCEDURE — 81270 JAK2 GENE: CPT | Mod: 90 | Performed by: PATHOLOGY

## 2021-10-01 PROCEDURE — 85060 BLOOD SMEAR INTERPRETATION: CPT | Performed by: PATHOLOGY

## 2021-10-01 PROCEDURE — 83550 IRON BINDING TEST: CPT | Performed by: PATHOLOGY

## 2021-10-01 PROCEDURE — 80053 COMPREHEN METABOLIC PANEL: CPT | Performed by: PATHOLOGY

## 2021-10-01 PROCEDURE — 99000 SPECIMEN HANDLING OFFICE-LAB: CPT | Performed by: PATHOLOGY

## 2021-10-01 PROCEDURE — 84550 ASSAY OF BLOOD/URIC ACID: CPT | Performed by: PATHOLOGY

## 2021-10-02 LAB — EPO SERPL-ACNC: 38 MU/ML

## 2021-10-03 LAB
PATH REPORT.COMMENTS IMP SPEC: NORMAL
PATH REPORT.FINAL DX SPEC: NORMAL
PATH REPORT.MICROSCOPIC SPEC OTHER STN: NORMAL
PATH REPORT.RELEVANT HX SPEC: NORMAL

## 2021-10-04 LAB
PATH REPORT.COMMENTS IMP SPEC: NORMAL
PATH REPORT.COMMENTS IMP SPEC: NORMAL
PATH REPORT.FINAL DX SPEC: NORMAL
PATH REPORT.MICROSCOPIC SPEC OTHER STN: NORMAL
PATH REPORT.MICROSCOPIC SPEC OTHER STN: NORMAL
PATH REPORT.RELEVANT HX SPEC: NORMAL

## 2021-10-07 LAB — METHYLMALONATE SERPL-SCNC: 0.22 UMOL/L (ref 0–0.4)

## 2021-10-19 DIAGNOSIS — D64.9 ANEMIA, UNSPECIFIED TYPE: ICD-10-CM

## 2021-10-19 DIAGNOSIS — D75.839 THROMBOCYTOSIS: Primary | ICD-10-CM

## 2021-10-22 ENCOUNTER — ANCILLARY PROCEDURE (OUTPATIENT)
Dept: CT IMAGING | Facility: CLINIC | Age: 61
End: 2021-10-22
Attending: INTERNAL MEDICINE
Payer: COMMERCIAL

## 2021-10-22 DIAGNOSIS — M79.89 SWELLING OF LOWER EXTREMITY: ICD-10-CM

## 2021-10-22 DIAGNOSIS — R10.84 ABDOMINAL PAIN, GENERALIZED: ICD-10-CM

## 2021-10-22 DIAGNOSIS — R10.2 PELVIC PAIN IN FEMALE: ICD-10-CM

## 2021-10-22 LAB — RADIOLOGIST FLAGS: NORMAL

## 2021-10-22 PROCEDURE — 74176 CT ABD & PELVIS W/O CONTRAST: CPT | Mod: GC | Performed by: RADIOLOGY

## 2021-10-22 PROCEDURE — 71250 CT THORAX DX C-: CPT | Mod: GC | Performed by: RADIOLOGY

## 2021-10-22 RX ORDER — IOPAMIDOL 755 MG/ML
114 INJECTION, SOLUTION INTRAVASCULAR ONCE
Status: DISCONTINUED | OUTPATIENT
Start: 2021-10-22 | End: 2021-10-22

## 2021-10-26 ENCOUNTER — TELEPHONE (OUTPATIENT)
Dept: ONCOLOGY | Facility: CLINIC | Age: 61
End: 2021-10-26

## 2021-10-28 ENCOUNTER — ONCOLOGY VISIT (OUTPATIENT)
Dept: ONCOLOGY | Facility: CLINIC | Age: 61
End: 2021-10-28
Attending: INTERNAL MEDICINE
Payer: COMMERCIAL

## 2021-10-28 ENCOUNTER — PATIENT OUTREACH (OUTPATIENT)
Dept: ONCOLOGY | Facility: CLINIC | Age: 61
End: 2021-10-28

## 2021-10-28 ENCOUNTER — APPOINTMENT (OUTPATIENT)
Dept: LAB | Facility: CLINIC | Age: 61
End: 2021-10-28
Attending: INTERNAL MEDICINE
Payer: COMMERCIAL

## 2021-10-28 VITALS
WEIGHT: 191.4 LBS | TEMPERATURE: 98.6 F | DIASTOLIC BLOOD PRESSURE: 61 MMHG | HEART RATE: 68 BPM | RESPIRATION RATE: 16 BRPM | OXYGEN SATURATION: 99 % | BODY MASS INDEX: 29.54 KG/M2 | SYSTOLIC BLOOD PRESSURE: 131 MMHG

## 2021-10-28 DIAGNOSIS — D64.9 ANEMIA, UNSPECIFIED TYPE: ICD-10-CM

## 2021-10-28 DIAGNOSIS — D47.1 MPN (MYELOPROLIFERATIVE NEOPLASM) (H): Primary | ICD-10-CM

## 2021-10-28 DIAGNOSIS — R30.0 DYSURIA: ICD-10-CM

## 2021-10-28 DIAGNOSIS — N39.0 URINARY TRACT INFECTION WITHOUT HEMATURIA, SITE UNSPECIFIED: Primary | ICD-10-CM

## 2021-10-28 DIAGNOSIS — D75.839 THROMBOCYTOSIS: ICD-10-CM

## 2021-10-28 LAB
ALBUMIN SERPL-MCNC: 3.6 G/DL (ref 3.4–5)
ALBUMIN UR-MCNC: NEGATIVE MG/DL
ALP SERPL-CCNC: 88 U/L (ref 40–150)
ALT SERPL W P-5'-P-CCNC: 80 U/L (ref 0–50)
ANION GAP SERPL CALCULATED.3IONS-SCNC: 3 MMOL/L (ref 3–14)
APPEARANCE UR: CLEAR
AST SERPL W P-5'-P-CCNC: 39 U/L (ref 0–45)
BASOPHILS # BLD AUTO: 0.1 10E3/UL (ref 0–0.2)
BASOPHILS NFR BLD AUTO: 1 %
BILIRUB SERPL-MCNC: 0.3 MG/DL (ref 0.2–1.3)
BILIRUB UR QL STRIP: NEGATIVE
BUN SERPL-MCNC: 10 MG/DL (ref 7–30)
CALCIUM SERPL-MCNC: 9 MG/DL (ref 8.5–10.1)
CHLORIDE BLD-SCNC: 109 MMOL/L (ref 94–109)
CO2 SERPL-SCNC: 28 MMOL/L (ref 20–32)
COLOR UR AUTO: YELLOW
CREAT SERPL-MCNC: 0.54 MG/DL (ref 0.52–1.04)
EOSINOPHIL # BLD AUTO: 0.1 10E3/UL (ref 0–0.7)
EOSINOPHIL NFR BLD AUTO: 2 %
ERYTHROCYTE [DISTWIDTH] IN BLOOD BY AUTOMATED COUNT: 19.3 % (ref 10–15)
GFR SERPL CREATININE-BSD FRML MDRD: >90 ML/MIN/1.73M2
GLUCOSE BLD-MCNC: 134 MG/DL (ref 70–99)
GLUCOSE UR STRIP-MCNC: NEGATIVE MG/DL
HCT VFR BLD AUTO: 34.9 % (ref 35–47)
HGB BLD-MCNC: 10.7 G/DL (ref 11.7–15.7)
HGB UR QL STRIP: NEGATIVE
HYALINE CASTS: 1 /LPF
IMM GRANULOCYTES # BLD: 0.2 10E3/UL
IMM GRANULOCYTES NFR BLD: 3 %
KETONES UR STRIP-MCNC: NEGATIVE MG/DL
LDH SERPL L TO P-CCNC: 456 U/L (ref 81–234)
LEUKOCYTE ESTERASE UR QL STRIP: ABNORMAL
LYMPHOCYTES # BLD AUTO: 1.7 10E3/UL (ref 0.8–5.3)
LYMPHOCYTES NFR BLD AUTO: 28 %
MCH RBC QN AUTO: 27.8 PG (ref 26.5–33)
MCHC RBC AUTO-ENTMCNC: 30.7 G/DL (ref 31.5–36.5)
MCV RBC AUTO: 91 FL (ref 78–100)
MONOCYTES # BLD AUTO: 0.6 10E3/UL (ref 0–1.3)
MONOCYTES NFR BLD AUTO: 9 %
MUCOUS THREADS #/AREA URNS LPF: PRESENT /LPF
NEUTROPHILS # BLD AUTO: 3.5 10E3/UL (ref 1.6–8.3)
NEUTROPHILS NFR BLD AUTO: 57 %
NITRATE UR QL: NEGATIVE
NRBC # BLD AUTO: 0.2 10E3/UL
NRBC BLD AUTO-RTO: 4 /100
PH UR STRIP: 7 [PH] (ref 5–7)
PLATELET # BLD AUTO: 477 10E3/UL (ref 150–450)
POTASSIUM BLD-SCNC: 4 MMOL/L (ref 3.4–5.3)
PROT SERPL-MCNC: 7.2 G/DL (ref 6.8–8.8)
RBC # BLD AUTO: 3.85 10E6/UL (ref 3.8–5.2)
RBC URINE: <1 /HPF
SODIUM SERPL-SCNC: 140 MMOL/L (ref 133–144)
SP GR UR STRIP: 1.01 (ref 1–1.03)
SQUAMOUS EPITHELIAL: 1 /HPF
UROBILINOGEN UR STRIP-MCNC: NORMAL MG/DL
WBC # BLD AUTO: 6.1 10E3/UL (ref 4–11)
WBC URINE: 22 /HPF

## 2021-10-28 PROCEDURE — 36415 COLL VENOUS BLD VENIPUNCTURE: CPT | Performed by: INTERNAL MEDICINE

## 2021-10-28 PROCEDURE — 83615 LACTATE (LD) (LDH) ENZYME: CPT | Performed by: INTERNAL MEDICINE

## 2021-10-28 PROCEDURE — 82040 ASSAY OF SERUM ALBUMIN: CPT | Performed by: INTERNAL MEDICINE

## 2021-10-28 PROCEDURE — 81001 URINALYSIS AUTO W/SCOPE: CPT | Performed by: INTERNAL MEDICINE

## 2021-10-28 PROCEDURE — 85004 AUTOMATED DIFF WBC COUNT: CPT | Performed by: INTERNAL MEDICINE

## 2021-10-28 PROCEDURE — G0463 HOSPITAL OUTPT CLINIC VISIT: HCPCS

## 2021-10-28 PROCEDURE — 99214 OFFICE O/P EST MOD 30 MIN: CPT | Performed by: INTERNAL MEDICINE

## 2021-10-28 PROCEDURE — 87086 URINE CULTURE/COLONY COUNT: CPT | Performed by: INTERNAL MEDICINE

## 2021-10-28 RX ORDER — ATORVASTATIN CALCIUM 80 MG/1
TABLET, FILM COATED ORAL
COMMUNITY
Start: 2021-10-19 | End: 2023-03-16

## 2021-10-28 RX ORDER — CEPHALEXIN 500 MG/1
500 CAPSULE ORAL 3 TIMES DAILY
Qty: 21 CAPSULE | Refills: 0 | Status: SHIPPED | OUTPATIENT
Start: 2021-10-28 | End: 2021-11-04

## 2021-10-28 ASSESSMENT — PAIN SCALES - GENERAL: PAINLEVEL: NO PAIN (0)

## 2021-10-28 NOTE — NURSING NOTE
Chief Complaint   Patient presents with     Blood Draw     Labs drawn via vpt by RN in lab. VS taken     Labs collected from venipuncture by RN. Vitals taken. Checked in for next appointment.      Camille Melchor RN

## 2021-10-28 NOTE — PROGRESS NOTES
RN CARE COORDINATION    Outgoing Call:      Spoke with Nancy via [employer] - Financial Reconciliation . Reviewed that her urinalysis shows probable infection and she is being started on Keflex. Requested she  script today and start medication. . Discussed that urine cultures were in process and we will let her know if it shows her prescription needs to change.  Nancy verbalized understanding.     Encouraged Nancy to call with any additional questions or concerns.         MIRIAN Rehman, RN  RN Care Coordinator  [facility]

## 2021-10-28 NOTE — PATIENT INSTRUCTIONS
Urine test today    Schedule Bone marrow biopsy    See me 2-3 weeks after the Bone marrow biopsy

## 2021-10-28 NOTE — NURSING NOTE
Urine sample collected and sent down to first floor lab per providers orders.     Erin Jacobo LPN on 10/28/2021 at 1:42 PM

## 2021-10-28 NOTE — NURSING NOTE
"Oncology Rooming Note    October 28, 2021 1:00 PM   Nancy Rodríguez is a 61 year old female who presents for:    Chief Complaint   Patient presents with     Blood Draw     Labs drawn via vpt by RN in lab. VS taken     Oncology Clinic Visit     thrombocytosis     Initial Vitals: /61   Pulse 68   Temp 98.6  F (37  C) (Oral)   Resp 16   Wt 86.8 kg (191 lb 6.4 oz)   SpO2 99%   BMI 29.54 kg/m   Estimated body mass index is 29.54 kg/m  as calculated from the following:    Height as of 7/12/17: 1.715 m (5' 7.5\").    Weight as of this encounter: 86.8 kg (191 lb 6.4 oz). Body surface area is 2.03 meters squared.  No Pain (0) Comment: Data Unavailable   No LMP recorded. Patient is postmenopausal.  Allergies reviewed: Yes  Medications reviewed: Yes    Medications: Medication refills not needed today.  Pharmacy name entered into [employer]: [id number] - [place], [place] - [address] [facility] [address]    Clinical concerns: none       Macarena Kim CMA            "

## 2021-10-28 NOTE — PROGRESS NOTES
Hematology follow up visit:  Date on this visit: 10/28/21     Nancy Rodríguez  is referred by Dr.Karen Fernandez for a hematology consultation. She requires evaluation for thrombocytosis/left shift WBC and blast on smear.    Primary Physician: Tasha Nick     History Of Present Illness:  Ms. Rodríguez is a 61 year old female who presents with thrombocytosis, anemia, left shifted WBC and blasts on smear.    I initially saw her on 9/28/2021.  Please see previous note for details.  I have copied and updated from prior note.    Looking back she has had thrombocytosis and anemia for a number of years.  On 8/20/2021, peripheral blood smear showed left shifted WBC with blast-like cells in addition to anemia and thrombocytosis.      She feels pain in the pelvic area and back pain. Sometimes feels leg are swollen. Right ankle is more swollen. It is not more swollen. This is going on for 4 months. She denies B symptoms. No abnormal bleeding, erythromelalgia, dyspnea. Denies significant dyspnea. Feels fatigued. No nausea, vomiting, diarrhea or constipation. Last mammogram was few years ago.    I had recommended further testing and she is here for follow-up.    Interval history.    A [employer] Interpretor # [id number] is available on the IPAD.   She has pain in the muscles and back and this is better than before. No new pain. No B symptoms. No new swellings. She has mild right ankle swelling. No SOB or nausea/vomiting. Has occasional constipation.  Energy is better with mild fatigue. No fevers. She has burning micturition.       ROS:  Rest of the comprehensive review of the system was unremarkable.    I reviewed other history in epic as below.      Past Medical/Surgical History:  Past Medical History:   Diagnosis Date     Diabetes (H)      Headache      Nonsenile cataract      Past Surgical History:   Procedure Laterality Date     ANKLE SURGERY      left     Allergies:  Allergies as of 10/28/2021     (No Known Allergies)     Current  Medications:  Current Outpatient Medications   Medication Sig Dispense Refill     ASPIRIN PO Take 81 mg by mouth daily       atorvastatin (LIPITOR) 40 MG tablet Take 40 mg by mouth daily       blood glucose (ACCU-CHEK FASTCLIX) lancing device 1 kit by Other route       blood glucose monitoring (ACCU-CHEK FASTCLIX) lancets CHECK BLOOD SUGAR TWICE DAILY.       Calcium Carb-Cholecalciferol (CALCIUM 600 + D PO) Take 1 tablet by mouth daily       capsaicin (ZOSTRIX) 0.025 % external cream capsaicin 0.025 % topical cream   BALDEMAR 1 APPLICATION AA TID PRN P       cholecalciferol (VITAMIN D3) 125 mcg (5000 units) capsule TAKE 1 CAPSULE BY MOUTH ONCE DAILY       cyclobenzaprine (FLEXERIL) 10 MG tablet cyclobenzaprine 10 mg tablet   TK 1 T PO TID PRF MUSCLE SPASM       DOCUSATE SODIUM PO Take 100 mg by mouth daily       IBUPROFEN PO Take 600 mg by mouth as needed for moderate pain       magnesium hydroxide (MILK OF MAGNESIA) 400 MG/5ML suspension Take 800 mg by mouth       methocarbamol (ROBAXIN) 500 MG tablet TAKE 1 TABLET BY MOUTH TWICE DAILY AS NEEDED       OMEPRAZOLE PO Take 20 mg by mouth every morning       propranolol ER (INDERAL LA) 160 MG 24 hr capsule        QUETIAPINE FUMARATE PO Take 50 mg by mouth       UNKNOWN TO PATIENT Pt states that she takes many medications, but is uncertain what they are for and what dosages they are.       venlafaxine (EFFEXOR-XR) 75 MG 24 hr capsule venlafaxine ER 75 mg capsule,extended release 24 hr   TAKE ONE CAPSULE BY MOUTH ONCE DAILY        Family History:  Family History   Problem Relation Age of Onset     Glaucoma No family hx of      Macular Degeneration No family hx of      No family history of bleeding or clotting disorder or cancers.    Social History:  Social History     Socioeconomic History     Marital status: Single     Spouse name: Not on file     Number of children: Not on file     Years of education: Not on file     Highest education level: Not on file   Occupational  History     Not on file   Tobacco Use     Smoking status: Never Smoker     Smokeless tobacco: Never Used   Substance and Sexual Activity     Alcohol use: No     Alcohol/week: 0.0 standard drinks     Drug use: No     Sexual activity: Not on file   Other Topics Concern     Not on file   Social History Narrative     Not on file     Social Determinants of Health     Financial Resource Strain:      Difficulty of Paying Living Expenses:    Food Insecurity:      Worried About Running Out of Food in the Last Year:      Ran Out of Food in the Last Year:    Transportation Needs:      Lack of Transportation (Medical):      Lack of Transportation (Non-Medical):    Physical Activity:      Days of Exercise per Week:      Minutes of Exercise per Session:    Stress:      Feeling of Stress :    Social Connections:      Frequency of Communication with Friends and Family:      Frequency of Social Gatherings with Friends and Family:      Attends [employer] Services:      Active Member of Clubs or Organizations:      Attends Club or Organization Meetings:      Marital Status:    Intimate Partner Violence:      Fear of Current or Ex-Partner:      Emotionally Abused:      Physically Abused:      Sexually Abused:      Physical Exam:  There were no vitals taken for this visit.     Wt Readings from Last 4 Encounters:   11/30/20 83.9 kg (185 lb)   08/23/17 83.3 kg (183 lb 9.6 oz)   07/12/17 84.8 kg (187 lb)   08/30/11 82.8 kg (182 lb 8.7 oz)       CONSTITUTIONAL: No apparent distress  EYES: PERRLA, without pallor or jaundice  ENT/MOUTH: Ears unremarkable. No oral lesions  CVS: s1s2 normal  RESPIRATORY: Chest is clear  GI: Abdomen is benign  NEURO: Alert and oriented ×3  INTEGUMENT: no concerning skin rashes   LYMPHATIC: no palpable lymphadenopathy  MUSCULOSKELETAL: Unremarkable. No bony tenderness.   EXTREMITIES: no pedal edema  PSYCH: Mentation, mood and affect are appropriate        Laboratory/Imaging Studies      Reviewed    10/28/2021.     CBC showed WBC 6.1.  Hemoglobin 10.7.  Platelets 477.  Overall is stable.  Comprehensive metabolic panel remarkable for ALT 80 but otherwise unremarkable.      .      10/1/2021.  CBC shows WBC 6.1.  Hemoglobin 10.6.  Platelets 511.    Peripheral blood smear showed slight normochromic, normocytic anemia with occasional teardrop cells but circulating nucleated red blood cells with slight thrombocytosis and slight left shift neutrophilia.      Flow cytometry showed rare circulating CD34 positive myeloid blasts (1.3%), unusual CD8-positive CD57-positive T cells (1.6%) was present, and the B cells were polytypic.  The findings are not defintivie for a T cell lymphoproliferative disorder or a high grade myeloid neoplasm.    NGS panel shows CALR L367fs.  Low level JAK2 gene variant: p.V617F (1.6%) was again detected ( Off note in 2006, JAK2 mutation was detected by PCR. )    FISH for BCR/ABL is pending.     She has had thrombocytosis and anemia for a number of years.      CMP unremarkable.  .  Uric acid 3.8.  Iron studies show ferritin 82, iron 80, TIBC 279 and iron saturation index 29%.  Erythropoietin 38.  B12 and folate are normal.    CT chest abdomen and pelvis on 10/22/2021 does not show any acute findings.  It showed borderline splenomegaly and nonspecific heterogenous sclerotic  appearance of the vertebrae and pelvic bones.    Bilateral lower extremity ultrasound on 10/22/2021 does not show evidence of DVT.          ASSESSMENT/PLAN:    Thrombocytosis/anemia/left shifted WBC/occasional blast-like cells.   CALR L367fs.  Low level JAK2 gene variant: p.V617F (1.6%)  She has borderline splenomegaly.  LDH is 463.  These findings are concerning for underlying myeloproliferative neoplasm.    I would like to do a bone marrow biopsy. We discussed the procedure including its potential complications. We will try to schedule in the next few days.     Dysuria. Check UA to rule out infection.       Pelvic/back pain  and extreme fatigue- overall better. CT CAP was essentially unremarkable except borderline splenomegaly     Leg swelling- R>L. US did not show any DVT- swelling is mild. Observe for now.       See me back 2-3 weeks after the BM biopsy    Her questions were answered to her satisfaction.  She is agreeable and comfortable with the plan.    Eddie Duong MD    Addendum.  Urinalysis is suggestive of infection.  I will treat her with Keflex 500 mg three times a day for 7 days.  We will follow the urine cultures.  Eddie Duong MD  10/28/21

## 2021-10-28 NOTE — LETTER
10/28/2021         RE: Nancy Rodríguez  [address]  [address]  [facility] [address]        Dear Colleague,    Thank you for referring your patient, Nancy Rodríguez, to the [facility]. Please see a copy of my visit note below.    Hematology follow up visit:  Date on this visit: 10/28/21     Nancy Rodríguez  is referred by Dr.Karen Fernandez for a hematology consultation. She requires evaluation for thrombocytosis/left shift WBC and blast on smear.    Primary Physician: Tasha Nick     History Of Present Illness:  Ms. Rodríguez is a 61 year old female who presents with thrombocytosis, anemia, left shifted WBC and blasts on smear.    I initially saw her on 9/28/2021.  Please see previous note for details.  I have copied and updated from prior note.    Looking back she has had thrombocytosis and anemia for a number of years.  On 8/20/2021, peripheral blood smear showed left shifted WBC with blast-like cells in addition to anemia and thrombocytosis.      She feels pain in the pelvic area and back pain. Sometimes feels leg are swollen. Right ankle is more swollen. It is not more swollen. This is going on for 4 months. She denies B symptoms. No abnormal bleeding, erythromelalgia, dyspnea. Denies significant dyspnea. Feels fatigued. No nausea, vomiting, diarrhea or constipation. Last mammogram was few years ago.    I had recommended further testing and she is here for follow-up.    Interval history.    A [employer] Interpretor # [id number] is available on the IPAD.   She has pain in the muscles and back and this is better than before. No new pain. No B symptoms. No new swellings. She has mild right ankle swelling. No SOB or nausea/vomiting. Has occasional constipation.  Energy is better with mild fatigue. No fevers. She has burning micturition.       ROS:  Rest of the comprehensive review of the system was unremarkable.    I reviewed other history in epic as below.      Past Medical/Surgical  History:  Past Medical History:   Diagnosis Date     Diabetes (H)      Headache      Nonsenile cataract      Past Surgical History:   Procedure Laterality Date     ANKLE SURGERY      left     Allergies:  Allergies as of 10/28/2021     (No Known Allergies)     Current Medications:  Current Outpatient Medications   Medication Sig Dispense Refill     ASPIRIN PO Take 81 mg by mouth daily       atorvastatin (LIPITOR) 40 MG tablet Take 40 mg by mouth daily       blood glucose (ACCU-CHEK FASTCLIX) lancing device 1 kit by Other route       blood glucose monitoring (ACCU-CHEK FASTCLIX) lancets CHECK BLOOD SUGAR TWICE DAILY.       Calcium Carb-Cholecalciferol (CALCIUM 600 + D PO) Take 1 tablet by mouth daily       capsaicin (ZOSTRIX) 0.025 % external cream capsaicin 0.025 % topical cream   BALDEMAR 1 APPLICATION AA TID PRN P       cholecalciferol (VITAMIN D3) 125 mcg (5000 units) capsule TAKE 1 CAPSULE BY MOUTH ONCE DAILY       cyclobenzaprine (FLEXERIL) 10 MG tablet cyclobenzaprine 10 mg tablet   TK 1 T PO TID PRF MUSCLE SPASM       DOCUSATE SODIUM PO Take 100 mg by mouth daily       IBUPROFEN PO Take 600 mg by mouth as needed for moderate pain       magnesium hydroxide (MILK OF MAGNESIA) 400 MG/5ML suspension Take 800 mg by mouth       methocarbamol (ROBAXIN) 500 MG tablet TAKE 1 TABLET BY MOUTH TWICE DAILY AS NEEDED       OMEPRAZOLE PO Take 20 mg by mouth every morning       propranolol ER (INDERAL LA) 160 MG 24 hr capsule        QUETIAPINE FUMARATE PO Take 50 mg by mouth       UNKNOWN TO PATIENT Pt states that she takes many medications, but is uncertain what they are for and what dosages they are.       venlafaxine (EFFEXOR-XR) 75 MG 24 hr capsule venlafaxine ER 75 mg capsule,extended release 24 hr   TAKE ONE CAPSULE BY MOUTH ONCE DAILY        Family History:  Family History   Problem Relation Age of Onset     Glaucoma No family hx of      Macular Degeneration No family hx of      No family history of bleeding or clotting  disorder or cancers.    Social History:  Social History     Socioeconomic History     Marital status: Single     Spouse name: Not on file     Number of children: Not on file     Years of education: Not on file     Highest education level: Not on file   Occupational History     Not on file   Tobacco Use     Smoking status: Never Smoker     Smokeless tobacco: Never Used   Substance and Sexual Activity     Alcohol use: No     Alcohol/week: 0.0 standard drinks     Drug use: No     Sexual activity: Not on file   Other Topics Concern     Not on file   Social History Narrative     Not on file     Social Determinants of Health     Financial Resource Strain:      Difficulty of Paying Living Expenses:    Food Insecurity:      Worried About Running Out of Food in the Last Year:      Ran Out of Food in the Last Year:    Transportation Needs:      Lack of Transportation (Medical):      Lack of Transportation (Non-Medical):    Physical Activity:      Days of Exercise per Week:      Minutes of Exercise per Session:    Stress:      Feeling of Stress :    Social Connections:      Frequency of Communication with Friends and Family:      Frequency of Social Gatherings with Friends and Family:      Attends L[employer] Services:      Active Member of Clubs or Organizations:      Attends Club or Organization Meetings:      Marital Status:    Intimate Partner Violence:      Fear of Current or Ex-Partner:      Emotionally Abused:      Physically Abused:      Sexually Abused:      Physical Exam:  There were no vitals taken for this visit.     Wt Readings from Last 4 Encounters:   11/30/20 83.9 kg (185 lb)   08/23/17 83.3 kg (183 lb 9.6 oz)   07/12/17 84.8 kg (187 lb)   08/30/11 82.8 kg (182 lb 8.7 oz)       CONSTITUTIONAL: No apparent distress  EYES: PERRLA, without pallor or jaundice  ENT/MOUTH: Ears unremarkable. No oral lesions  CVS: s1s2 normal  RESPIRATORY: Chest is clear  GI: Abdomen is benign  NEURO: Alert and oriented ×3  INTEGUMENT:  no concerning skin rashes   LYMPHATIC: no palpable lymphadenopathy  MUSCULOSKELETAL: Unremarkable. No bony tenderness.   EXTREMITIES: no pedal edema  PSYCH: Mentation, mood and affect are appropriate        Laboratory/Imaging Studies      Reviewed    10/28/2021.    CBC showed WBC 6.1.  Hemoglobin 10.7.  Platelets 477.  Overall is stable.  Comprehensive metabolic panel remarkable for ALT 80 but otherwise unremarkable.      .      10/1/2021.  CBC shows WBC 6.1.  Hemoglobin 10.6.  Platelets 511.    Peripheral blood smear showed slight normochromic, normocytic anemia with occasional teardrop cells but circulating nucleated red blood cells with slight thrombocytosis and slight left shift neutrophilia.      Flow cytometry showed rare circulating CD34 positive myeloid blasts (1.3%), unusual CD8-positive CD57-positive T cells (1.6%) was present, and the B cells were polytypic.  The findings are not defintivie for a T cell lymphoproliferative disorder or a high grade myeloid neoplasm.    NGS panel shows CALR L367fs.  Low level JAK2 gene variant: p.V617F (1.6%) was again detected ( Off note in 2006, JAK2 mutation was detected by PCR. )    FISH for BCR/ABL is pending.     She has had thrombocytosis and anemia for a number of years.      CMP unremarkable.  .  Uric acid 3.8.  Iron studies show ferritin 82, iron 80, TIBC 279 and iron saturation index 29%.  Erythropoietin 38.  B12 and folate are normal.    CT chest abdomen and pelvis on 10/22/2021 does not show any acute findings.  It showed borderline splenomegaly and nonspecific heterogenous sclerotic  appearance of the vertebrae and pelvic bones.    Bilateral lower extremity ultrasound on 10/22/2021 does not show evidence of DVT.          ASSESSMENT/PLAN:    Thrombocytosis/anemia/left shifted WBC/occasional blast-like cells.   CALR L367fs.  Low level JAK2 gene variant: p.V617F (1.6%)  She has borderline splenomegaly.  LDH is 463.  These findings are concerning for  underlying myeloproliferative neoplasm.    I would like to do a bone marrow biopsy. We discussed the procedure including its potential complications. We will try to schedule in the next few days.     Dysuria. Check UA to rule out infection.       Pelvic/back pain and extreme fatigue- overall better. CT CAP was essentially unremarkable except borderline splenomegaly     Leg swelling- R>L. US did not show any DVT- swelling is mild. Observe for now.       See me back 2-3 weeks after the BM biopsy    Her questions were answered to her satisfaction.  She is agreeable and comfortable with the plan.    Eddie Duong MD    Addendum.  Urinalysis is suggestive of infection.  I will treat her with Keflex 500 mg three times a day for 7 days.  We will follow the urine cultures.  Eddie Duong MD  10/28/21       Again, thank you for allowing me to participate in the care of your patient.        Sincerely,        Eddie Duong MD

## 2021-10-29 LAB — BACTERIA UR CULT: NO GROWTH

## 2021-11-12 ENCOUNTER — APPOINTMENT (OUTPATIENT)
Dept: LAB | Facility: CLINIC | Age: 61
End: 2021-11-12
Payer: COMMERCIAL

## 2021-11-12 ENCOUNTER — OFFICE VISIT (OUTPATIENT)
Dept: ONCOLOGY | Facility: CLINIC | Age: 61
End: 2021-11-12
Attending: PHYSICIAN ASSISTANT
Payer: COMMERCIAL

## 2021-11-12 VITALS
OXYGEN SATURATION: 99 % | DIASTOLIC BLOOD PRESSURE: 76 MMHG | SYSTOLIC BLOOD PRESSURE: 127 MMHG | RESPIRATION RATE: 14 BRPM | HEART RATE: 76 BPM | TEMPERATURE: 98.3 F

## 2021-11-12 DIAGNOSIS — D75.839 THROMBOCYTOSIS: Primary | ICD-10-CM

## 2021-11-12 DIAGNOSIS — D47.1 MPN (MYELOPROLIFERATIVE NEOPLASM) (H): ICD-10-CM

## 2021-11-12 DIAGNOSIS — D64.9 ANEMIA, UNSPECIFIED TYPE: ICD-10-CM

## 2021-11-12 LAB
BASOPHILS # BLD AUTO: 0.1 10E3/UL (ref 0–0.2)
BASOPHILS NFR BLD AUTO: 1 %
EOSINOPHIL # BLD AUTO: 0.2 10E3/UL (ref 0–0.7)
EOSINOPHIL NFR BLD AUTO: 2 %
ERYTHROCYTE [DISTWIDTH] IN BLOOD BY AUTOMATED COUNT: 19.3 % (ref 10–15)
HCT VFR BLD AUTO: 34.2 % (ref 35–47)
HGB BLD-MCNC: 10.5 G/DL (ref 11.7–15.7)
IMM GRANULOCYTES # BLD: 0.2 10E3/UL
IMM GRANULOCYTES NFR BLD: 3 %
LYMPHOCYTES # BLD AUTO: 2 10E3/UL (ref 0.8–5.3)
LYMPHOCYTES NFR BLD AUTO: 27 %
MCH RBC QN AUTO: 28.5 PG (ref 26.5–33)
MCHC RBC AUTO-ENTMCNC: 30.7 G/DL (ref 31.5–36.5)
MCV RBC AUTO: 93 FL (ref 78–100)
MONOCYTES # BLD AUTO: 0.7 10E3/UL (ref 0–1.3)
MONOCYTES NFR BLD AUTO: 10 %
NEUTROPHILS # BLD AUTO: 4.1 10E3/UL (ref 1.6–8.3)
NEUTROPHILS NFR BLD AUTO: 57 %
NRBC # BLD AUTO: 0.4 10E3/UL
NRBC BLD AUTO-RTO: 5 /100
PLATELET # BLD AUTO: 503 10E3/UL (ref 150–450)
RBC # BLD AUTO: 3.69 10E6/UL (ref 3.8–5.2)
WBC # BLD AUTO: 7.3 10E3/UL (ref 4–11)

## 2021-11-12 PROCEDURE — 88311 DECALCIFY TISSUE: CPT | Mod: 26 | Performed by: STUDENT IN AN ORGANIZED HEALTH CARE EDUCATION/TRAINING PROGRAM

## 2021-11-12 PROCEDURE — 88189 FLOWCYTOMETRY/READ 16 & >: CPT | Performed by: PATHOLOGY

## 2021-11-12 PROCEDURE — 88271 CYTOGENETICS DNA PROBE: CPT | Performed by: PHYSICIAN ASSISTANT

## 2021-11-12 PROCEDURE — 88184 FLOWCYTOMETRY/ TC 1 MARKER: CPT | Performed by: PHYSICIAN ASSISTANT

## 2021-11-12 PROCEDURE — 88341 IMHCHEM/IMCYTCHM EA ADD ANTB: CPT | Mod: 26 | Performed by: STUDENT IN AN ORGANIZED HEALTH CARE EDUCATION/TRAINING PROGRAM

## 2021-11-12 PROCEDURE — 36415 COLL VENOUS BLD VENIPUNCTURE: CPT | Performed by: PHYSICIAN ASSISTANT

## 2021-11-12 PROCEDURE — 88275 CYTOGENETICS 100-300: CPT | Performed by: PHYSICIAN ASSISTANT

## 2021-11-12 PROCEDURE — 88311 DECALCIFY TISSUE: CPT | Mod: TC | Performed by: PHYSICIAN ASSISTANT

## 2021-11-12 PROCEDURE — 85025 COMPLETE CBC W/AUTO DIFF WBC: CPT | Performed by: PHYSICIAN ASSISTANT

## 2021-11-12 PROCEDURE — 38222 DX BONE MARROW BX & ASPIR: CPT | Performed by: PHYSICIAN ASSISTANT

## 2021-11-12 PROCEDURE — 88305 TISSUE EXAM BY PATHOLOGIST: CPT | Mod: 26 | Performed by: STUDENT IN AN ORGANIZED HEALTH CARE EDUCATION/TRAINING PROGRAM

## 2021-11-12 PROCEDURE — 88280 CHROMOSOME KARYOTYPE STUDY: CPT | Performed by: PHYSICIAN ASSISTANT

## 2021-11-12 PROCEDURE — 88185 FLOWCYTOMETRY/TC ADD-ON: CPT | Performed by: PHYSICIAN ASSISTANT

## 2021-11-12 PROCEDURE — 88342 IMHCHEM/IMCYTCHM 1ST ANTB: CPT | Mod: 26 | Performed by: STUDENT IN AN ORGANIZED HEALTH CARE EDUCATION/TRAINING PROGRAM

## 2021-11-12 PROCEDURE — 85097 BONE MARROW INTERPRETATION: CPT | Performed by: STUDENT IN AN ORGANIZED HEALTH CARE EDUCATION/TRAINING PROGRAM

## 2021-11-12 PROCEDURE — 88291 CYTO/MOLECULAR REPORT: CPT | Performed by: MEDICAL GENETICS

## 2021-11-12 PROCEDURE — 85060 BLOOD SMEAR INTERPRETATION: CPT | Performed by: STUDENT IN AN ORGANIZED HEALTH CARE EDUCATION/TRAINING PROGRAM

## 2021-11-12 PROCEDURE — 88237 TISSUE CULTURE BONE MARROW: CPT | Performed by: PHYSICIAN ASSISTANT

## 2021-11-12 PROCEDURE — 88313 SPECIAL STAINS GROUP 2: CPT | Mod: 26 | Performed by: STUDENT IN AN ORGANIZED HEALTH CARE EDUCATION/TRAINING PROGRAM

## 2021-11-12 ASSESSMENT — PAIN SCALES - GENERAL: PAINLEVEL: NO PAIN (0)

## 2021-11-12 NOTE — NURSING NOTE
"Oncology Rooming Note    November 12, 2021 2:23 PM   Nancy Rodríguez is a 61 year old female who presents for:    Chief Complaint   Patient presents with     Bone Marrow Biopsy     bmbx with oncology team     Initial Vitals: /76   Pulse 76   Temp 98.3  F (36.8  C)   Resp 14   SpO2 99%  Estimated body mass index is 29.54 kg/m  as calculated from the following:    Height as of 7/12/17: 1.715 m (5' 7.5\").    Weight as of 10/28/21: 86.8 kg (191 lb 6.4 oz). There is no height or weight on file to calculate BSA.  No Pain (0) Comment: Data Unavailable   No LMP recorded. Patient is postmenopausal.  Allergies reviewed: Yes  Medications reviewed: Yes    Medications: MEDICATION REFILLS NEEDED TODAY. Provider was notified.  Pharmacy name entered into "[employer]: [facility] #[id number] - [place], [place] - [address] AT [facility] [address]    Clinical concerns: none       Sherin Garcia RN            "

## 2021-11-12 NOTE — NURSING NOTE
BMT Teaching Flowsheet   Teaching Topic: post biopsy instructions via Polish     Person(s) involved in teaching: Patient  Motivation Level  Asks Questions: Yes  Eager to Learn: Yes  Cooperative: Yes  Receptive (willing/able to accept information): Yes    Patient demonstrates understanding of the following:   - Reason for the appointment, diagnosis and treatment plan: Yes  - Knowledge of proper use of medications and conditions for which they are ordered (with special attention to potential side effects or drug interactions): Yes  - Which situations necessitate calling provider and whom to contact: Yes    Teaching concerns addressed: reviewed activity restrictions if received premeds, potential for bleeding and actions to take if develops any of the issues below    Proper use and care of (medical equipment, care aids, etc.) Yes  Pain management techniques: Yes  Patient instructed on hand hygiene: Yes  How and/when to access community resources: Yes    Infection Control:  Patient demonstrates understanding of the following:   Surgical procedure site care taught NA  Signs and symptoms of infection taught Yes  Wound care taught Yes  Central venous catheter care taught NA    Instructional Materials Used/Given: verbal, print out of post biopsy instructions.       Specific Concerns: NA

## 2021-11-12 NOTE — PROGRESS NOTES
Patient supine for 30 minutes following biopsy. After 30 minutes, dressing clean, dry and intact. Vital signs stable. See DOC flow sheets for details. Left ambulatory with family member.    Sherin Lfoton RN

## 2021-11-16 NOTE — PROGRESS NOTES
BMT ONC Adult Bone Marrow Biopsy Procedure Note  November 15, 2021  /76   Pulse 76   Temp 98.3  F (36.8  C)   Resp 14   SpO2 99%      Learning needs assessment complete within 12 months? YES    DIAGNOSIS: elevated platelets    PROCEDURE: Unilateral Bone Marrow Biopsy and Unilateral Aspirate    [place] [facility]    Patient s identification was positively verified by verbal identification and invasive procedure safety checklist was completed. Informed consent was obtained. Following the administration of no medications as pre-medication, patient was placed in the left lateral decubitus position and prepped and draped in a sterile manner. Approximately 10 cc of 1% Lidocaine was used over the left posterior iliac spine. Following this a 3 mm incision was made. Trephine bone marrow core(s) was (were) obtained from the [facility]. Bone marrow aspirates were obtained from the IC. Aspirates were sent for morphology, immunophenotyping, cytogenetics and molecular diagnostic. A total of approximately 20 ml of marrow was aspirated. Following this procedure a sterile dressing was applied to the bone marrow biopsy site(s). The patient was placed in the supine position to maintain pressure on the biopsy site. Post-procedure wound care instructions were given.     Complications: NO    Pre-procedural pain: 0 out of 10 on the numeric pain rating scale.     Procedural pain: 2 out of 10 on the numeric pain rating scale.     Post-procedural pain assessment: 0 out of 10 on the numeric pain rating scale.     Interventions: NO    Length of procedure:21 minutes to 45 minutes    Procedure performed by: Serenity Stout PA-C  Video  was used for assistance with this [place] patient.

## 2021-11-18 LAB
PATH REPORT.ADDENDUM SPEC: NORMAL
PATH REPORT.COMMENTS IMP SPEC: NORMAL
PATH REPORT.COMMENTS IMP SPEC: NORMAL
PATH REPORT.FINAL DX SPEC: NORMAL
PATH REPORT.GROSS SPEC: NORMAL
PATH REPORT.MICROSCOPIC SPEC OTHER STN: NORMAL
PATH REPORT.MICROSCOPIC SPEC OTHER STN: NORMAL
PATH REPORT.RELEVANT HX SPEC: NORMAL

## 2021-11-19 LAB — INTERPRETATION: NORMAL

## 2021-12-02 ENCOUNTER — TELEPHONE (OUTPATIENT)
Dept: TRANSPLANT | Facility: CLINIC | Age: 61
End: 2021-12-02

## 2021-12-02 ENCOUNTER — ONCOLOGY VISIT (OUTPATIENT)
Dept: ONCOLOGY | Facility: CLINIC | Age: 61
End: 2021-12-02
Attending: INTERNAL MEDICINE
Payer: COMMERCIAL

## 2021-12-02 VITALS
BODY MASS INDEX: 29.29 KG/M2 | WEIGHT: 189.8 LBS | SYSTOLIC BLOOD PRESSURE: 152 MMHG | OXYGEN SATURATION: 100 % | RESPIRATION RATE: 16 BRPM | DIASTOLIC BLOOD PRESSURE: 73 MMHG | HEART RATE: 71 BPM | TEMPERATURE: 98.7 F

## 2021-12-02 DIAGNOSIS — D47.1 MPN (MYELOPROLIFERATIVE NEOPLASM) (H): Primary | ICD-10-CM

## 2021-12-02 PROCEDURE — 99215 OFFICE O/P EST HI 40 MIN: CPT | Performed by: INTERNAL MEDICINE

## 2021-12-02 PROCEDURE — G0463 HOSPITAL OUTPT CLINIC VISIT: HCPCS

## 2021-12-02 RX ORDER — IBUPROFEN 600 MG/1
TABLET, FILM COATED ORAL
COMMUNITY
Start: 2021-11-28 | End: 2023-12-14

## 2021-12-02 RX ORDER — LANCETS
EACH MISCELLANEOUS
COMMUNITY
Start: 2021-11-25 | End: 2022-09-15

## 2021-12-02 RX ORDER — BLOOD SUGAR DIAGNOSTIC
STRIP MISCELLANEOUS
COMMUNITY
Start: 2021-11-26

## 2021-12-02 RX ORDER — OMEPRAZOLE 10 MG/1
CAPSULE, DELAYED RELEASE ORAL
COMMUNITY
Start: 2021-11-25 | End: 2022-09-15

## 2021-12-02 ASSESSMENT — PAIN SCALES - GENERAL: PAINLEVEL: NO PAIN (0)

## 2021-12-02 NOTE — NURSING NOTE
"Oncology Rooming Note    December 2, 2021 3:24 PM   Nancy Rodríguez is a 61 year old female who presents for:    Chief Complaint   Patient presents with     Oncology Clinic Visit     Thrombocytosis Anemia      Initial Vitals: BP (!) 152/73 (BP Location: Right arm, Patient Position: Sitting, Cuff Size: Adult Regular)   Pulse 71   Temp 98.7  F (37.1  C) (Oral)   Resp 16   Wt 86.1 kg (189 lb 12.8 oz)   SpO2 100%   BMI 29.29 kg/m   Estimated body mass index is 29.29 kg/m  as calculated from the following:    Height as of 7/12/17: 1.715 m (5' 7.5\").    Weight as of this encounter: 86.1 kg (189 lb 12.8 oz). Body surface area is 2.02 meters squared.  No Pain (0) Comment: Data Unavailable   No LMP recorded. Patient is postmenopausal.  Allergies reviewed: Yes  Medications reviewed: Yes    Medications: Medication refills not needed today.  Pharmacy name entered into [employer]: [facility] #[id number] - [facility] [address] AT [facility] [address]    Clinical concerns: Please discuss biopsy results from marrow and tissue. Please discuss next step in care plan.  on site today to translate.        Ayanna Cortez LPN December 2, 2021 3:25 PM                "

## 2021-12-02 NOTE — PROGRESS NOTES
Hematology follow up visit:  Date on this visit: 12/02/21     Nancy Rodríguez  is referred by Dr.Karen Fernandez for a hematology consultation. She requires evaluation for thrombocytosis/left shift WBC and blast on smear.    Primary Physician: Tasha Nick     History Of Present Illness:  Ms. Rodríguez is a 61 year old female who presents with thrombocytosis, anemia, left shifted WBC and blasts on smear.    I initially saw her on 9/28/2021.  Please see previous note for details.  I have copied and updated from prior note.    Looking back she has had thrombocytosis and anemia for a number of years.  On 8/20/2021, peripheral blood smear showed left shifted WBC with blast-like cells in addition to anemia and thrombocytosis.      She feels pain in the pelvic area and back pain. Sometimes feels leg are swollen. Right ankle is more swollen. It is not more swollen. This is going on for 4 months. She denies B symptoms. No abnormal bleeding, erythromelalgia, dyspnea. Denies significant dyspnea. Feels fatigued. No nausea, vomiting, diarrhea or constipation. Last mammogram was few years ago.    I had recommended further testing and she is here for follow-up.    Interval history.  She is here with her daughter.  A [facility] Interpretor is available.   She feels good. No B symptoms. Pain in muscles and back is better. No GI complaints. No new swellings. No infections. Dysuria resolved after antibiotics. No dyspnea. No bleeding. Energy is decent.    ROS:  Rest of the comprehensive review of the system was unremarkable.    I reviewed other history in epic as below.      Past Medical/Surgical History:  Past Medical History:   Diagnosis Date     Diabetes (H)      Headache      Nonsenile cataract      Past Surgical History:   Procedure Laterality Date     ANKLE SURGERY      left     Allergies:  Allergies as of 12/02/2021     (No Known Allergies)     Current Medications:  Current Outpatient Medications   Medication Sig Dispense Refill      aspirin (ASA) 81 MG EC tablet Take 1 tablet by mouth daily       ASPIRIN PO Take 81 mg by mouth daily       atorvastatin (LIPITOR) 80 MG tablet        blood glucose (ACCU-CHEK FASTCLIX) lancing device 1 kit by Other route       blood glucose (NO BRAND SPECIFIED) test strip USE AS DIRECTED TO TEST BLOOD SUGAR TWICE DAILY       Calcium Carb-Cholecalciferol (CALCIUM 600 + D PO) Take 1 tablet by mouth daily       capsaicin (ZOSTRIX) 0.025 % external cream capsaicin 0.025 % topical cream   BALDEMAR 1 APPLICATION AA TID PRN P       cholecalciferol (VITAMIN D3) 125 mcg (5000 units) capsule TAKE 1 CAPSULE BY MOUTH ONCE DAILY       cyclobenzaprine (FLEXERIL) 10 MG tablet cyclobenzaprine 10 mg tablet   TK 1 T PO TID PRF MUSCLE SPASM       DOCUSATE SODIUM PO Take 100 mg by mouth daily       IBUPROFEN PO Take 600 mg by mouth as needed for moderate pain       methocarbamol (ROBAXIN) 500 MG tablet TAKE 1 TABLET BY MOUTH TWICE DAILY AS NEEDED       OMEPRAZOLE PO Take 20 mg by mouth every morning       propranolol ER (INDERAL LA) 160 MG 24 hr capsule        QUETIAPINE FUMARATE PO Take 50 mg by mouth       UNKNOWN TO PATIENT Pt states that she takes many medications, but is uncertain what they are for and what dosages they are.       venlafaxine (EFFEXOR-XR) 75 MG 24 hr capsule venlafaxine ER 75 mg capsule,extended release 24 hr   TAKE ONE CAPSULE BY MOUTH ONCE DAILY       ACCU-CHEK GUIDE test strip        blood glucose monitoring (ACCU-CHEK FASTCLIX) lancets        ibuprofen (ADVIL/MOTRIN) 600 MG tablet        magnesium hydroxide (MILK OF MAGNESIA) 400 MG/5ML suspension Take 800 mg by mouth (Patient not taking: Reported on 11/12/2021)       omeprazole (PRILOSEC) 10 MG DR capsule         Family History:  Family History   Problem Relation Age of Onset     Glaucoma No family hx of      Macular Degeneration No family hx of      No family history of bleeding or clotting disorder or cancers.    Social History:  Social History      Socioeconomic History     Marital status: Single     Spouse name: Not on file     Number of children: Not on file     Years of education: Not on file     Highest education level: Not on file   Occupational History     Not on file   Tobacco Use     Smoking status: Never Smoker     Smokeless tobacco: Never Used   Substance and Sexual Activity     Alcohol use: No     Alcohol/week: 0.0 standard drinks     Drug use: No     Sexual activity: Not on file   Other Topics Concern     Not on file   Social History Narrative     Not on file     Social Determinants of Health     Financial Resource Strain: Not on file   Food Insecurity: Not on file   Transportation Needs: Not on file   Physical Activity: Not on file   Stress: Not on file   Social Connections: Not on file   Intimate Partner Violence: Not At Risk     Fear of Current or Ex-Partner: No     Emotionally Abused: No     Physically Abused: No     Sexually Abused: No   Housing Stability: Not on file     Physical Exam:  BP (!) 152/73 (BP Location: Right arm, Patient Position: Sitting, Cuff Size: Adult Regular)   Pulse 71   Temp 98.7  F (37.1  C) (Oral)   Resp 16   Wt 86.1 kg (189 lb 12.8 oz)   SpO2 100%   BMI 29.29 kg/m       Wt Readings from Last 4 Encounters:   12/02/21 86.1 kg (189 lb 12.8 oz)   10/28/21 86.8 kg (191 lb 6.4 oz)   11/30/20 83.9 kg (185 lb)   08/23/17 83.3 kg (183 lb 9.6 oz)       CONSTITUTIONAL: no acute distress  EYES: PERRLA, no palor or icterus.   ENT/MOUTH: no mouth lesions. Ears normal  CVS: s1s2 no m r g .   RESPIRATORY: clear to auscultation b/l  GI: soft non tender no hepatosplenomegaly  NEURO: AAOX3  Grossly non focal neuro exam  INTEGUMENT: no obvious rashes  LYMPHATIC: no palpable LAD  MUSCULOSKELETAL: Unremarkable. No bony tenderness.   EXTREMITIES: no edema  PSYCH: Mentation, mood and affect are normal. Decision making capacity is intact      Laboratory/Imaging Studies      Reviewed    11/12/2021.    CBC showed WBC 7.3.  Hemoglobin  10.5.  Platelets 503.    Bone marrow biopsy shows myeloproliferative neoplasm.  There is normocellular marrow with trilineage hematopoiesis and increased and atypical megakaryocytes and 1% blasts.  Mild to moderate increase in marrow reticulin fibrosis (MF 1-2 of 3) with features suggestive of osteosclerosis.  This is most likely essential thrombocytosis with early myelofibrosis.  A distinction between primary myelofibrosis and eating with early myelofibrosis is difficult.    Concurrent flow cytometry ([id number]) shows polytypic B cells, a small unusual T cell population similar to that detected in a recent peripheral blood flow cytometry study, no increase in myeloid blasts, and no abnormal myeloid blast population. There is no definitive morphologic or immunohistochemical correlate to the unusual T cells detected by flow cytometry, which may be present in the marrow or may reflect peripheral blood contamination.     Cytogenetics is pending.      10/28/2021.    CBC showed WBC 6.1.  Hemoglobin 10.7.  Platelets 477.  Overall is stable.  Comprehensive metabolic panel remarkable for ALT 80 but otherwise unremarkable.      .      10/1/2021.  CBC shows WBC 6.1.  Hemoglobin 10.6.  Platelets 511.    Peripheral blood smear showed slight normochromic, normocytic anemia with occasional teardrop cells but circulating nucleated red blood cells with slight thrombocytosis and slight left shift neutrophilia.      Flow cytometry showed rare circulating CD34 positive myeloid blasts (1.3%), unusual CD8-positive CD57-positive T cells (1.6%) was present, and the B cells were polytypic.  The findings are not defintivie for a T cell lymphoproliferative disorder or a high grade myeloid neoplasm.    NGS panel shows CALR L367fs.  Low level JAK2 gene variant: p.V617F (1.6%) was again detected ( Off note in 2006, JAK2 mutation was detected by PCR. )      She has had thrombocytosis and anemia for a number of years.      CMP  unremarkable.  .  Uric acid 3.8.  Iron studies show ferritin 82, iron 80, TIBC 279 and iron saturation index 29%.  Erythropoietin 38.  B12 and folate are normal.    CT chest abdomen and pelvis on 10/22/2021 does not show any acute findings.  It showed borderline splenomegaly and nonspecific heterogenous sclerotic  appearance of the vertebrae and pelvic bones.    Bilateral lower extremity ultrasound on 10/22/2021 does not show evidence of DVT.          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  CALR L367fs positive.  Low level JAK2 gene variant: p.V617F (1.6%)  She has had thrombocytosis, anemia, left shifted WBC and has borderline splenomegaly.  LDH is 463.    Continue aspirin.  I recommend starting low-dose of Hydrea 500 mg daily as she is above 60. No hx of clots.   We discussed the rational and potential side effects of Hydrea. She does not want to start this. We discussed that we will readdress this later and if there is evidence of progression then I will strongly consider it.   Check labs every 2 months for now.  Would also refer her for initial evaluation for bone marrow transplant although at this time it is not indicated but it is better to involve them at an early stage.      Pelvic/back pain and extreme fatigue- overall better. CT CAP was essentially unremarkable except borderline splenomegaly     Leg swelling- R>L. US did not show any DVT- swelling has resolved.       See me back in 4 months.    Her questions were answered to her satisfaction.  She is agreeable and comfortable with the plan.    Eddie Duong MD    I spent 45 minutes on this visit on the date of service, including the face to face time, reviewing records and labs and imaging and placing new orders as well as coordination of care and documentation.

## 2021-12-02 NOTE — LETTER
12/2/2021         RE: Nancy Rodríguez  [address]  [address]  [facility] [address]        Dear Colleague,    Thank you for referring your patient, Nancy Rodríguez, to the [facility]. Please see a copy of my visit note below.    Hematology follow up visit:  Date on this visit: 12/02/21     Nancy Rodríguez  is referred by Dr.Karen Fernandez for a hematology consultation. She requires evaluation for thrombocytosis/left shift WBC and blast on smear.    Primary Physician: Tasha Nick     History Of Present Illness:  Ms. Rodríguez is a 61 year old female who presents with thrombocytosis, anemia, left shifted WBC and blasts on smear.    I initially saw her on 9/28/2021.  Please see previous note for details.  I have copied and updated from prior note.    Looking back she has had thrombocytosis and anemia for a number of years.  On 8/20/2021, peripheral blood smear showed left shifted WBC with blast-like cells in addition to anemia and thrombocytosis.      She feels pain in the pelvic area and back pain. Sometimes feels leg are swollen. Right ankle is more swollen. It is not more swollen. This is going on for 4 months. She denies B symptoms. No abnormal bleeding, erythromelalgia, dyspnea. Denies significant dyspnea. Feels fatigued. No nausea, vomiting, diarrhea or constipation. Last mammogram was few years ago.    I had recommended further testing and she is here for follow-up.    Interval history.  She is here with her daughter.  A [facility] Interpretor is available.   She feels good. No B symptoms. Pain in muscles and back is better. No GI complaints. No new swellings. No infections. Dysuria resolved after antibiotics. No dyspnea. No bleeding. Energy is decent.    ROS:  Rest of the comprehensive review of the system was unremarkable.    I reviewed other history in epic as below.      Past Medical/Surgical History:  Past Medical History:   Diagnosis Date     Diabetes (H)      Headache       Nonsenile cataract      Past Surgical History:   Procedure Laterality Date     ANKLE SURGERY      left     Allergies:  Allergies as of 12/02/2021     (No Known Allergies)     Current Medications:  Current Outpatient Medications   Medication Sig Dispense Refill     aspirin (ASA) 81 MG EC tablet Take 1 tablet by mouth daily       ASPIRIN PO Take 81 mg by mouth daily       atorvastatin (LIPITOR) 80 MG tablet        blood glucose (ACCU-CHEK FASTCLIX) lancing device 1 kit by Other route       blood glucose (NO BRAND SPECIFIED) test strip USE AS DIRECTED TO TEST BLOOD SUGAR TWICE DAILY       Calcium Carb-Cholecalciferol (CALCIUM 600 + D PO) Take 1 tablet by mouth daily       capsaicin (ZOSTRIX) 0.025 % external cream capsaicin 0.025 % topical cream   BALDEMAR 1 APPLICATION AA TID PRN P       cholecalciferol (VITAMIN D3) 125 mcg (5000 units) capsule TAKE 1 CAPSULE BY MOUTH ONCE DAILY       cyclobenzaprine (FLEXERIL) 10 MG tablet cyclobenzaprine 10 mg tablet   TK 1 T PO TID PRF MUSCLE SPASM       DOCUSATE SODIUM PO Take 100 mg by mouth daily       IBUPROFEN PO Take 600 mg by mouth as needed for moderate pain       methocarbamol (ROBAXIN) 500 MG tablet TAKE 1 TABLET BY MOUTH TWICE DAILY AS NEEDED       OMEPRAZOLE PO Take 20 mg by mouth every morning       propranolol ER (INDERAL LA) 160 MG 24 hr capsule        QUETIAPINE FUMARATE PO Take 50 mg by mouth       UNKNOWN TO PATIENT Pt states that she takes many medications, but is uncertain what they are for and what dosages they are.       venlafaxine (EFFEXOR-XR) 75 MG 24 hr capsule venlafaxine ER 75 mg capsule,extended release 24 hr   TAKE ONE CAPSULE BY MOUTH ONCE DAILY       ACCU-CHEK GUIDE test strip        blood glucose monitoring (ACCU-CHEK FASTCLIX) lancets        ibuprofen (ADVIL/MOTRIN) 600 MG tablet        magnesium hydroxide (MILK OF MAGNESIA) 400 MG/5ML suspension Take 800 mg by mouth (Patient not taking: Reported on 11/12/2021)       omeprazole (PRILOSEC) 10 MG   capsule         Family History:  Family History   Problem Relation Age of Onset     Glaucoma No family hx of      Macular Degeneration No family hx of      No family history of bleeding or clotting disorder or cancers.    Social History:  Social History     Socioeconomic History     Marital status: Single     Spouse name: Not on file     Number of children: Not on file     Years of education: Not on file     Highest education level: Not on file   Occupational History     Not on file   Tobacco Use     Smoking status: Never Smoker     Smokeless tobacco: Never Used   Substance and Sexual Activity     Alcohol use: No     Alcohol/week: 0.0 standard drinks     Drug use: No     Sexual activity: Not on file   Other Topics Concern     Not on file   Social History Narrative     Not on file     Social Determinants of Health     Financial Resource Strain: Not on file   Food Insecurity: Not on file   Transportation Needs: Not on file   Physical Activity: Not on file   Stress: Not on file   Social Connections: Not on file   Intimate Partner Violence: Not At Risk     Fear of Current or Ex-Partner: No     Emotionally Abused: No     Physically Abused: No     Sexually Abused: No   Housing Stability: Not on file     Physical Exam:  BP (!) 152/73 (BP Location: Right arm, Patient Position: Sitting, Cuff Size: Adult Regular)   Pulse 71   Temp 98.7  F (37.1  C) (Oral)   Resp 16   Wt 86.1 kg (189 lb 12.8 oz)   SpO2 100%   BMI 29.29 kg/m       Wt Readings from Last 4 Encounters:   12/02/21 86.1 kg (189 lb 12.8 oz)   10/28/21 86.8 kg (191 lb 6.4 oz)   11/30/20 83.9 kg (185 lb)   08/23/17 83.3 kg (183 lb 9.6 oz)       CONSTITUTIONAL: no acute distress  EYES: PERRLA, no palor or icterus.   ENT/MOUTH: no mouth lesions. Ears normal  CVS: s1s2 no m r g .   RESPIRATORY: clear to auscultation b/l  GI: soft non tender no hepatosplenomegaly  NEURO: AAOX3  Grossly non focal neuro exam  INTEGUMENT: no obvious rashes  LYMPHATIC: no palpable  LAD  MUSCULOSKELETAL: Unremarkable. No bony tenderness.   EXTREMITIES: no edema  PSYCH: Mentation, mood and affect are normal. Decision making capacity is intact      Laboratory/Imaging Studies      Reviewed    11/12/2021.    CBC showed WBC 7.3.  Hemoglobin 10.5.  Platelets 503.    Bone marrow biopsy shows myeloproliferative neoplasm.  There is normocellular marrow with trilineage hematopoiesis and increased and atypical megakaryocytes and 1% blasts.  Mild to moderate increase in marrow reticulin fibrosis (MF 1-2 of 3) with features suggestive of osteosclerosis.  This is most likely essential thrombocytosis with early myelofibrosis.  A distinction between primary myelofibrosis and eating with early myelofibrosis is difficult.    Concurrent flow cytometry ([id number]) shows polytypic B cells, a small unusual T cell population similar to that detected in a recent peripheral blood flow cytometry study, no increase in myeloid blasts, and no abnormal myeloid blast population. There is no definitive morphologic or immunohistochemical correlate to the unusual T cells detected by flow cytometry, which may be present in the marrow or may reflect peripheral blood contamination.     Cytogenetics is pending.      10/28/2021.    CBC showed WBC 6.1.  Hemoglobin 10.7.  Platelets 477.  Overall is stable.  Comprehensive metabolic panel remarkable for ALT 80 but otherwise unremarkable.      .      10/1/2021.  CBC shows WBC 6.1.  Hemoglobin 10.6.  Platelets 511.    Peripheral blood smear showed slight normochromic, normocytic anemia with occasional teardrop cells but circulating nucleated red blood cells with slight thrombocytosis and slight left shift neutrophilia.      Flow cytometry showed rare circulating CD34 positive myeloid blasts (1.3%), unusual CD8-positive CD57-positive T cells (1.6%) was present, and the B cells were polytypic.  The findings are not defintivie for a T cell lymphoproliferative disorder or a high grade  myeloid neoplasm.    NGS panel shows CALR L367fs.  Low level JAK2 gene variant: p.V617F (1.6%) was again detected ( Off note in 2006, JAK2 mutation was detected by PCR. )      She has had thrombocytosis and anemia for a number of years.      CMP unremarkable.  .  Uric acid 3.8.  Iron studies show ferritin 82, iron 80, TIBC 279 and iron saturation index 29%.  Erythropoietin 38.  B12 and folate are normal.    CT chest abdomen and pelvis on 10/22/2021 does not show any acute findings.  It showed borderline splenomegaly and nonspecific heterogenous sclerotic  appearance of the vertebrae and pelvic bones.    Bilateral lower extremity ultrasound on 10/22/2021 does not show evidence of DVT.          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  CALR L367fs positive.  Low level JAK2 gene variant: p.V617F (1.6%)  She has had thrombocytosis, anemia, left shifted WBC and has borderline splenomegaly.  LDH is 463.    Continue aspirin.  I recommend starting low-dose of Hydrea 500 mg daily as she is above 60. No hx of clots.   We discussed the rational and potential side effects of Hydrea. She does not want to start this. We discussed that we will readdress this later and if there is evidence of progression then I will strongly consider it.   Check labs every 2 months for now.  Would also refer her for initial evaluation for bone marrow transplant although at this time it is not indicated but it is better to involve them at an early stage.      Pelvic/back pain and extreme fatigue- overall better. CT CAP was essentially unremarkable except borderline splenomegaly     Leg swelling- R>L. US did not show any DVT- swelling has resolved.       See me back in 4 months.    Her questions were answered to her satisfaction.  She is agreeable and comfortable with the plan.    Eddie Duong MD    I spent 45 minutes on this visit on the date of service, including the  face to face time, reviewing records and labs and imaging and placing new orders as well as coordination of care and documentation.

## 2021-12-02 NOTE — PATIENT INSTRUCTIONS
You have Myeloproliferative neoplasm, most likely Essential Thrombocytosis with early myelofibrosis      Cont aspirin daily    Labs every 2 months    See me back in 4 months    Refer to BMT

## 2021-12-07 ENCOUNTER — LAB REQUISITION (OUTPATIENT)
Dept: LAB | Facility: CLINIC | Age: 61
End: 2021-12-07
Payer: COMMERCIAL

## 2021-12-07 DIAGNOSIS — E11.9 TYPE 2 DIABETES MELLITUS WITHOUT COMPLICATIONS (H): ICD-10-CM

## 2021-12-07 LAB
CHOLEST SERPL-MCNC: 173 MG/DL
CREAT UR-MCNC: 136 MG/DL
FASTING STATUS PATIENT QL REPORTED: NO
HDLC SERPL-MCNC: 44 MG/DL
LDLC SERPL CALC-MCNC: 85 MG/DL
MICROALBUMIN UR-MCNC: 19 MG/L
MICROALBUMIN/CREAT UR: 13.97 MG/G CR (ref 0–25)
NONHDLC SERPL-MCNC: 129 MG/DL
TRIGL SERPL-MCNC: 219 MG/DL

## 2021-12-07 PROCEDURE — 82043 UR ALBUMIN QUANTITATIVE: CPT | Mod: ORL | Performed by: FAMILY MEDICINE

## 2021-12-07 PROCEDURE — 80061 LIPID PANEL: CPT | Mod: ORL | Performed by: FAMILY MEDICINE

## 2021-12-14 LAB — CULTURE HARVEST COMPLETE DATE: NORMAL

## 2021-12-16 ENCOUNTER — PATIENT OUTREACH (OUTPATIENT)
Dept: ONCOLOGY | Facility: CLINIC | Age: 61
End: 2021-12-16
Payer: COMMERCIAL

## 2021-12-16 DIAGNOSIS — D47.1 MPN (MYELOPROLIFERATIVE NEOPLASM) (H): Primary | ICD-10-CM

## 2021-12-16 LAB
CULTURE HARVEST COMPLETE DATE: NORMAL
INTERPRETATION: NORMAL
ISCN: NORMAL
METHODS: NORMAL

## 2021-12-16 RX ORDER — HYDROXYUREA 500 MG/1
500 CAPSULE ORAL DAILY
Qty: 30 CAPSULE | Refills: 3 | Status: SHIPPED | OUTPATIENT
Start: 2021-12-16 | End: 2022-02-10

## 2021-12-16 NOTE — PROGRESS NOTES
"RN CARE COORDINATION    Outgoing Call:    Spoke with Jigna, Nancy's daughter, regarding Nancy's request to start Hydrea per Dr. Duong's recommendation at their last visit. Reviewed with Jigna that medication had been sent to their local pharmacy. Plan to mail [facility] education sheet and \"When to call\" information sheet.              Reviewed need for labs every two weeks and follow-up with Dr. Duong on 2/10. Will mail itinerary with lab appt dates and times.     Encouraged Jigna to call with any additional questions or concerns.       MIRIAN Rehman, RN  RN Care Coordinator  [facility]      "

## 2021-12-27 ENCOUNTER — TELEPHONE (OUTPATIENT)
Dept: TRANSPLANT | Facility: CLINIC | Age: 61
End: 2021-12-27
Payer: COMMERCIAL

## 2021-12-27 NOTE — TELEPHONE ENCOUNTER
Called pt. to confirm new appointment. The pt. Stated that she is not hospitalized or plans to be hospitalized during the time of the appointment.     Gave our office number in case the pt. has any further questions or concerns.

## 2021-12-29 ENCOUNTER — OFFICE VISIT (OUTPATIENT)
Dept: TRANSPLANT | Facility: CLINIC | Age: 61
End: 2021-12-29
Attending: INTERNAL MEDICINE
Payer: COMMERCIAL

## 2021-12-29 ENCOUNTER — APPOINTMENT (OUTPATIENT)
Dept: LAB | Facility: CLINIC | Age: 61
End: 2021-12-29
Attending: INTERNAL MEDICINE
Payer: COMMERCIAL

## 2021-12-29 VITALS
HEART RATE: 79 BPM | RESPIRATION RATE: 16 BRPM | TEMPERATURE: 98.6 F | SYSTOLIC BLOOD PRESSURE: 149 MMHG | OXYGEN SATURATION: 99 % | WEIGHT: 188.8 LBS | DIASTOLIC BLOOD PRESSURE: 62 MMHG | BODY MASS INDEX: 29.13 KG/M2

## 2021-12-29 DIAGNOSIS — D47.1 MPN (MYELOPROLIFERATIVE NEOPLASM) (H): ICD-10-CM

## 2021-12-29 LAB
ABO/RH(D): NORMAL
ALBUMIN SERPL-MCNC: 3.8 G/DL (ref 3.4–5)
ALP SERPL-CCNC: 73 U/L (ref 40–150)
ALT SERPL W P-5'-P-CCNC: 39 U/L (ref 0–50)
ANION GAP SERPL CALCULATED.3IONS-SCNC: 6 MMOL/L (ref 3–14)
ANTIBODY SCREEN: NEGATIVE
AST SERPL W P-5'-P-CCNC: 31 U/L (ref 0–45)
BASOPHILS # BLD AUTO: 0 10E3/UL (ref 0–0.2)
BASOPHILS NFR BLD AUTO: 1 %
BILIRUB SERPL-MCNC: 0.3 MG/DL (ref 0.2–1.3)
BUN SERPL-MCNC: 11 MG/DL (ref 7–30)
CALCIUM SERPL-MCNC: 8.8 MG/DL (ref 8.5–10.1)
CHLORIDE BLD-SCNC: 105 MMOL/L (ref 94–109)
CO2 SERPL-SCNC: 28 MMOL/L (ref 20–32)
CREAT SERPL-MCNC: 0.46 MG/DL (ref 0.52–1.04)
EOSINOPHIL # BLD AUTO: 0.1 10E3/UL (ref 0–0.7)
EOSINOPHIL NFR BLD AUTO: 1 %
ERYTHROCYTE [DISTWIDTH] IN BLOOD BY AUTOMATED COUNT: 19.2 % (ref 10–15)
GFR SERPL CREATININE-BSD FRML MDRD: >90 ML/MIN/1.73M2
GLUCOSE BLD-MCNC: 96 MG/DL (ref 70–99)
HCT VFR BLD AUTO: 32.7 % (ref 35–47)
HGB BLD-MCNC: 10.2 G/DL (ref 11.7–15.7)
IMM GRANULOCYTES # BLD: 0.1 10E3/UL
IMM GRANULOCYTES NFR BLD: 2 %
LDH SERPL L TO P-CCNC: 501 U/L (ref 81–234)
LYMPHOCYTES # BLD AUTO: 1.2 10E3/UL (ref 0.8–5.3)
LYMPHOCYTES NFR BLD AUTO: 21 %
MCH RBC QN AUTO: 28.6 PG (ref 26.5–33)
MCHC RBC AUTO-ENTMCNC: 31.2 G/DL (ref 31.5–36.5)
MCV RBC AUTO: 92 FL (ref 78–100)
MONOCYTES # BLD AUTO: 0.9 10E3/UL (ref 0–1.3)
MONOCYTES NFR BLD AUTO: 15 %
NEUTROPHILS # BLD AUTO: 3.4 10E3/UL (ref 1.6–8.3)
NEUTROPHILS NFR BLD AUTO: 60 %
NRBC # BLD AUTO: 0.1 10E3/UL
NRBC BLD AUTO-RTO: 2 /100
PLATELET # BLD AUTO: 436 10E3/UL (ref 150–450)
POTASSIUM BLD-SCNC: 3.8 MMOL/L (ref 3.4–5.3)
PROT SERPL-MCNC: 7.5 G/DL (ref 6.8–8.8)
RBC # BLD AUTO: 3.57 10E6/UL (ref 3.8–5.2)
SODIUM SERPL-SCNC: 139 MMOL/L (ref 133–144)
SPECIMEN EXPIRATION DATE: NORMAL
WBC # BLD AUTO: 5.8 10E3/UL (ref 4–11)

## 2021-12-29 PROCEDURE — 999N001098 HLA ANTIBODY (PRA) CLASS II SCREEN

## 2021-12-29 PROCEDURE — 86832 HLA CLASS I HIGH DEFIN QUAL: CPT

## 2021-12-29 PROCEDURE — 83615 LACTATE (LD) (LDH) ENZYME: CPT

## 2021-12-29 PROCEDURE — 81378 HLA I & II TYPING HR: CPT

## 2021-12-29 PROCEDURE — 86833 HLA CLASS II HIGH DEFIN QUAL: CPT

## 2021-12-29 PROCEDURE — 86900 BLOOD TYPING SEROLOGIC ABO: CPT

## 2021-12-29 PROCEDURE — 82040 ASSAY OF SERUM ALBUMIN: CPT

## 2021-12-29 PROCEDURE — 85025 COMPLETE CBC W/AUTO DIFF WBC: CPT

## 2021-12-29 PROCEDURE — 86828 HLA CLASS I&II ANTIBODY QUAL: CPT

## 2021-12-29 PROCEDURE — 36415 COLL VENOUS BLD VENIPUNCTURE: CPT

## 2021-12-29 PROCEDURE — 99205 OFFICE O/P NEW HI 60 MIN: CPT

## 2021-12-29 PROCEDURE — 86644 CMV ANTIBODY: CPT

## 2021-12-29 PROCEDURE — G0463 HOSPITAL OUTPT CLINIC VISIT: HCPCS

## 2021-12-29 RX ORDER — SUMATRIPTAN 50 MG/1
50 TABLET, FILM COATED ORAL
COMMUNITY
Start: 2021-12-07 | End: 2023-06-15

## 2021-12-29 ASSESSMENT — PAIN SCALES - GENERAL: PAINLEVEL: NO PAIN (0)

## 2021-12-29 NOTE — LETTER
12/29/2021         RE: Nancy Rodríguez  [address]  [address]  [facility] [facility] [address]        Dear Colleague,    Thank you for referring your patient, Nancy Rodríguez, to the [facility]PL[facility] [place]. Please see a copy of my visit note below.    [facility]     Referring Provider: Eddie Duong MD  HPI: 60 yo Romanian speaking woman, p/w thrombocytosis, anemia, left shifted WBC and circulating blasts in 9/2021, the first 2 present for a few years. Minimal if any splenomegaly (12.3 cm) on CT 10/22/21. BM Bx on 11/12/21 showed JAK2 pos CALR pos (exon 9, Type 1) ET in transformation to MF, normal karyotype, she was started on HU (given age) and continued ASA. Marrow details:  -Normocellular marrow (variable cellularity, overall 30%) with trilineage hematopoiesis, increased and atypical megakaryocytes, and 1% blasts  -Mild to moderate increase in marrow reticulin fibrosis (MF 1-2 of 3) with features of suggestive of osteosclerosis  PMH: DM  FHx: no cancers; 3 sisters (70, 62, 58), 10 children here (all healthy)   SHx: no smoking or drinking  Allergies: none  ROS: negative on a 12-system review.     Current Outpatient Medications   Medication     ACCU-CHEK GUIDE test strip     aspirin (ASA) 81 MG EC tablet     ASPIRIN PO     atorvastatin (LIPITOR) 80 MG tablet     blood glucose (ACCU-CHEK FASTCLIX) lancing device     blood glucose (NO BRAND SPECIFIED) test strip     blood glucose monitoring (ACCU-CHEK FASTCLIX) lancets     Calcium Carb-Cholecalciferol (CALCIUM 600 + D PO)     capsaicin (ZOSTRIX) 0.025 % external cream     cholecalciferol (VITAMIN D3) 125 mcg (5000 units) capsule     cyclobenzaprine (FLEXERIL) 10 MG tablet     DOCUSATE SODIUM PO     hydroxyurea (HYDREA) 500 MG capsule     ibuprofen (ADVIL/MOTRIN) 600 MG tablet     IBUPROFEN PO     methocarbamol (ROBAXIN) 500 MG tablet     omeprazole (PRILOSEC) 10 MG DR capsule     OMEPRAZOLE PO      propranolol ER (INDERAL LA) 160 MG 24 hr capsule     QUETIAPINE FUMARATE PO     UNKNOWN TO PATIENT     venlafaxine (EFFEXOR-XR) 75 MG 24 hr capsule     magnesium hydroxide (MILK OF MAGNESIA) 400 MG/5ML suspension     SUMAtriptan (IMITREX) 50 MG tablet     No current facility-administered medications for this visit.     Ph/E:   Vitals: BP (!) 149/62   Pulse 79   Temp 98.6  F (37  C) (Oral)   Resp 16   Wt 85.6 kg (188 lb 12.8 oz)   SpO2 99%   BMI 29.13 kg/m    BMI= Body mass index is 29.13 kg/m .  ECOG PS: 0  General: NAD; H&N: no jaundice or mucosal lesions; Lungs clear; Heart RRR; Abdomen; Soft, No organomegaly; Extremities: No edema; Skin: No rash; Neuro: Nonfocal; Mood/Affect: appropriate; Lymph: no LAD    Assessment and Plan:  1. ET in transformation to MF: Only cure is allo, however not needed at this time given lack of symptoms and relatively unremarkable blood counts and near normal spleen size. However, we will type all potential donors to be ready. Agree with ASA+HU. She consented to [id number] for MUD search-related research, using interpretor based services in person. MUD preferred over haplo in MF. The 59 yo sister is a [place] but is in [place] right now. We will type her if possible.        Ethan Rincon

## 2021-12-29 NOTE — PROGRESS NOTES
[facility]     Referring Provider: Eddie Duong MD  HPI: 62 yo [place]an speaking woman, p/w thrombocytosis, anemia, left shifted WBC and circulating blasts in 9/2021, the first 2 present for a few years. Minimal if any splenomegaly (12.3 cm) on CT 10/22/21. BM Bx on 11/12/21 showed JAK2 pos CALR pos (exon 9, Type 1) ET in transformation to MF, normal karyotype, she was started on HU (given age) and continued ASA. Marrow details:  -Normocellular marrow (variable cellularity, overall 30%) with trilineage hematopoiesis, increased and atypical megakaryocytes, and 1% blasts  -Mild to moderate increase in marrow reticulin fibrosis (MF 1-2 of 3) with features of suggestive of osteosclerosis  PMH: DM  FHx: no cancers; 3 sisters (70, 62, 58), 10 children here (all healthy)   SHx: no smoking or drinking  Allergies: none  ROS: negative on a 12-system review.     Current Outpatient Medications   Medication     ACCU-CHEK GUIDE test strip     aspirin (ASA) 81 MG EC tablet     ASPIRIN PO     atorvastatin (LIPITOR) 80 MG tablet     blood glucose (ACCU-CHEK FASTCLIX) lancing device     blood glucose (NO BRAND SPECIFIED) test strip     blood glucose monitoring (ACCU-CHEK FASTCLIX) lancets     Calcium Carb-Cholecalciferol (CALCIUM 600 + D PO)     capsaicin (ZOSTRIX) 0.025 % external cream     cholecalciferol (VITAMIN D3) 125 mcg (5000 units) capsule     cyclobenzaprine (FLEXERIL) 10 MG tablet     DOCUSATE SODIUM PO     hydroxyurea (HYDREA) 500 MG capsule     ibuprofen (ADVIL/MOTRIN) 600 MG tablet     IBUPROFEN PO     methocarbamol (ROBAXIN) 500 MG tablet     omeprazole (PRILOSEC) 10 MG DR capsule     OMEPRAZOLE PO     propranolol ER (INDERAL LA) 160 MG 24 hr capsule     QUETIAPINE FUMARATE PO     UNKNOWN TO PATIENT     venlafaxine (EFFEXOR-XR) 75 MG 24 hr capsule     magnesium hydroxide (MILK OF MAGNESIA) 400 MG/5ML suspension     SUMAtriptan (IMITREX) 50 MG tablet     No current facility-administered  medications for this visit.     Ph/E:   Vitals: BP (!) 149/62   Pulse 79   Temp 98.6  F (37  C) (Oral)   Resp 16   Wt 85.6 kg (188 lb 12.8 oz)   SpO2 99%   BMI 29.13 kg/m    BMI= Body mass index is 29.13 kg/m .  ECOG PS: 0  General: NAD; H&N: no jaundice or mucosal lesions; Lungs clear; Heart RRR; Abdomen; Soft, No organomegaly; Extremities: No edema; Skin: No rash; Neuro: Nonfocal; Mood/Affect: appropriate; Lymph: no LAD    Assessment and Plan:  1. ET in transformation to MF: Only cure is allo, however not needed at this time given lack of symptoms and relatively unremarkable blood counts and near normal spleen size. However, we will type all potential donors to be ready. Agree with ASA+HU. She consented to [id number] for MUD search-related research, using interpretor based services in person. MUD preferred over haplo in MF. The 57 yo sister is a [place] but is in [place] right now. We will type her if possible.

## 2021-12-29 NOTE — NURSING NOTE
Chief Complaint   Patient presents with     Blood Draw     Labs drawn via VPT by RN in lab. VS taken.     Labs collected from venipuncture by RN. Vitals taken. Checked in for appointment(s).    Amy RUBI RN PHN BSN  BMT/Oncology Lab

## 2021-12-29 NOTE — NURSING NOTE
I drew labs via venipuncture on this pt while in clinic. They tolerated this well.   Labs sent down to the first floor labs.    Chari Aldridge MA

## 2021-12-29 NOTE — NURSING NOTE
"Oncology Rooming Note    December 29, 2021 1:56 PM   Nancy Rodríguez is a 61 year old female who presents for:    Chief Complaint   Patient presents with     Blood Draw     Labs drawn via VPT by RN in lab. VS taken.     Oncology Clinic Visit     UMP RETURN - THROMBOCYTOSIS     Initial Vitals: BP (!) 149/62   Pulse 79   Temp 98.6  F (37  C) (Oral)   Resp 16   Wt 85.6 kg (188 lb 12.8 oz)   SpO2 99%   BMI 29.13 kg/m   Estimated body mass index is 29.13 kg/m  as calculated from the following:    Height as of 7/12/17: 1.715 m (5' 7.5\").    Weight as of this encounter: 85.6 kg (188 lb 12.8 oz). Body surface area is 2.02 meters squared.  No Pain (0) Comment: Data Unavailable   No LMP recorded. Patient is postmenopausal.  Allergies reviewed: Yes  Medications reviewed: Yes    Medications: Medication refills not needed today.  Pharmacy name entered into [employer]: [id number] - [facility] [address] AT [facility] [address]    Clinical concerns: No new concerns. Proivder was notified.      Shree Cheatham LPN            "

## 2021-12-30 ENCOUNTER — VIRTUAL VISIT (OUTPATIENT)
Dept: TRANSPLANT | Facility: CLINIC | Age: 61
End: 2021-12-30
Attending: INTERNAL MEDICINE
Payer: COMMERCIAL

## 2021-12-30 DIAGNOSIS — Z71.9 VISIT FOR COUNSELING: Primary | ICD-10-CM

## 2021-12-30 DIAGNOSIS — D47.1 MPN (MYELOPROLIFERATIVE NEOPLASM) (H): Primary | ICD-10-CM

## 2021-12-30 LAB
CMV IGG SERPL IA-ACNC: >10 U/ML
CMV IGG SERPL IA-ACNC: ABNORMAL

## 2021-12-30 NOTE — LETTER
12/30/2021         RE: Nancy Rodríguez  [address]  [address]  [place] [facility] [address]        Dear Colleague,    Thank you for referring your patient, Nancy Rodríguez, to the [facility] BLOOD AND MARROW TRANSPLANT PROGRAM [place]. Please see a copy of my visit note below.    BMT Clinical Social Work New Transplant Evaluation    Information for this evaluation on December 30, 2021 was collected via Pt  report, consultation with medical team, and medical chart review. [employer]  used as well.    SW called the pt and LM for the pt to call back to discuss transplant. Per medical team pt will not need transplant at this time. SW will send a NT packet and encourage the pt to reach out of she has any questions.     ADAM Ramirez, JESICA  [facility]  Phone [phone]  Pager [phone]                    Again, thank you for allowing me to participate in the care of your patient.        Sincerely,        JESICA Cotto

## 2021-12-30 NOTE — LETTER
12/30/2021         RE: Nancy Rodríguez  [address]  [address]  [place] [address]        Dear Colleague,    Thank you for referring your patient, Nancy Rodríguez, to the [facility] BLOOD AND MARROW TRANSPLANT PROGRAM [place]. Please see a copy of my visit note below.    Spoke with Nancy and patient's daughter and , following new transplant visit with Dr. Rincon. Reviewed plan of care per NT conversation for Stem Cell Transplant. Explained role of the Nurse Coordinator throughout the BMT process as well as general time line and expectations for transplant. Discussed necessity of caregiver and program's proximity requirements. All questions were answered.     Plan: Allogeneic Transplant, pending identification of donor, readiness for transplant. Per Dr. Rincon, may not need transplant for many years to come.    Contact information provided for :  yes    HLA typing drawn: yes    PRA typing drawn:  yes    CMV-IgG and ABO-Rh drawn or in record:  yes    Contact information provided for :  yes    Financial Release for URD search obtained:  yes    [id number] Consent Signed: no    EOC Reason updated: yes      BMT Nurse Coordinator

## 2021-12-30 NOTE — PROGRESS NOTES
BMT Clinical Social Work New Transplant Evaluation    Information for this evaluation on December 30, 2021 was collected via Pt  report, consultation with medical team, and medical chart review. [employer]  used as well.    SW called the pt and LM for the pt to call back to discuss transplant. Per medical team pt will not need transplant at this time. SW will send a NT packet and encourage the pt to reach out of she has any questions.     ADAM Ramirez, [facility]  Phone [phone]  Pager [phone]

## 2021-12-30 NOTE — LETTER
Date:January 5, 2022      Provider requested that no letter be sent. Do not send.       [facility]

## 2022-01-13 ENCOUNTER — PATIENT OUTREACH (OUTPATIENT)
Dept: ONCOLOGY | Facility: CLINIC | Age: 62
End: 2022-01-13

## 2022-01-13 ENCOUNTER — LAB (OUTPATIENT)
Dept: LAB | Facility: CLINIC | Age: 62
End: 2022-01-13
Attending: INTERNAL MEDICINE
Payer: COMMERCIAL

## 2022-01-13 DIAGNOSIS — D47.1 MPN (MYELOPROLIFERATIVE NEOPLASM) (H): ICD-10-CM

## 2022-01-13 LAB
ALBUMIN SERPL-MCNC: 3.8 G/DL (ref 3.4–5)
ALP SERPL-CCNC: 72 U/L (ref 40–150)
ALT SERPL W P-5'-P-CCNC: 51 U/L (ref 0–50)
ANION GAP SERPL CALCULATED.3IONS-SCNC: 7 MMOL/L (ref 3–14)
AST SERPL W P-5'-P-CCNC: 32 U/L (ref 0–45)
BASOPHILS # BLD AUTO: 0.1 10E3/UL (ref 0–0.2)
BASOPHILS NFR BLD AUTO: 1 %
BILIRUB SERPL-MCNC: 0.2 MG/DL (ref 0.2–1.3)
BUN SERPL-MCNC: 12 MG/DL (ref 7–30)
CALCIUM SERPL-MCNC: 9.1 MG/DL (ref 8.5–10.1)
CHLORIDE BLD-SCNC: 107 MMOL/L (ref 94–109)
CO2 SERPL-SCNC: 27 MMOL/L (ref 20–32)
CREAT SERPL-MCNC: 0.49 MG/DL (ref 0.52–1.04)
EOSINOPHIL # BLD AUTO: 0.1 10E3/UL (ref 0–0.7)
EOSINOPHIL NFR BLD AUTO: 1 %
ERYTHROCYTE [DISTWIDTH] IN BLOOD BY AUTOMATED COUNT: 20.5 % (ref 10–15)
GFR SERPL CREATININE-BSD FRML MDRD: >90 ML/MIN/1.73M2
GLUCOSE BLD-MCNC: 118 MG/DL (ref 70–99)
HCT VFR BLD AUTO: 33.7 % (ref 35–47)
HGB BLD-MCNC: 10 G/DL (ref 11.7–15.7)
IMM GRANULOCYTES # BLD: 0.3 10E3/UL
IMM GRANULOCYTES NFR BLD: 4 %
LDH SERPL L TO P-CCNC: 471 U/L (ref 81–234)
LYMPHOCYTES # BLD AUTO: 1.9 10E3/UL (ref 0.8–5.3)
LYMPHOCYTES NFR BLD AUTO: 27 %
MCH RBC QN AUTO: 27.9 PG (ref 26.5–33)
MCHC RBC AUTO-ENTMCNC: 29.7 G/DL (ref 31.5–36.5)
MCV RBC AUTO: 94 FL (ref 78–100)
MONOCYTES # BLD AUTO: 0.8 10E3/UL (ref 0–1.3)
MONOCYTES NFR BLD AUTO: 11 %
NEUTROPHILS # BLD AUTO: 4 10E3/UL (ref 1.6–8.3)
NEUTROPHILS NFR BLD AUTO: 56 %
NRBC # BLD AUTO: 0.3 10E3/UL
NRBC BLD AUTO-RTO: 4 /100
PLATELET # BLD AUTO: 627 10E3/UL (ref 150–450)
POTASSIUM BLD-SCNC: 4.5 MMOL/L (ref 3.4–5.3)
PROT SERPL-MCNC: 7.2 G/DL (ref 6.8–8.8)
RBC # BLD AUTO: 3.58 10E6/UL (ref 3.8–5.2)
SODIUM SERPL-SCNC: 141 MMOL/L (ref 133–144)
WBC # BLD AUTO: 7.1 10E3/UL (ref 4–11)

## 2022-01-13 PROCEDURE — 36415 COLL VENOUS BLD VENIPUNCTURE: CPT

## 2022-01-13 PROCEDURE — 83615 LACTATE (LD) (LDH) ENZYME: CPT

## 2022-01-13 PROCEDURE — 85025 COMPLETE CBC W/AUTO DIFF WBC: CPT

## 2022-01-13 PROCEDURE — 80053 COMPREHEN METABOLIC PANEL: CPT

## 2022-01-13 NOTE — PROGRESS NOTES
RN CARE COORDINATION    Incoming Call: Phone call from the patient's daughter Jigna.  They are concerned about today's plt level of 627. Also noting that the patient has felt like her heart is racing intermittently.  They wonder if this is related to the elevated platelets. They are concerned they should see Dr. Duong before the appt scheduled on 2/10/22.    Plan: Per Dr. Duong platelets are mildly elevated and other labs are stable.  Her heart racing is unlikely to be related to her platelet levels. This was communicated to Jigna.  Recommended they follow up with [facility]'s primary care physician about the heart racing. Will keep current lab appointments on 1/27 & 2/10 and follow up with Dr. Duong on 2/10    Jigna verbalized understanding of all instructions. Questions were answered to the best of writer's ability. Jigna states no further questions or concerns at this time.        Silvia Cardozo, ISHMAELN, RN  RN Care Coordinator  [facility]

## 2022-01-13 NOTE — NURSING NOTE
Chief Complaint   Patient presents with     Blood Draw     Labs drawn via  by rn in lab.      Labs collected from venipuncture by RN.     Jude Montanez RN

## 2022-01-15 ENCOUNTER — HEALTH MAINTENANCE LETTER (OUTPATIENT)
Age: 62
End: 2022-01-15

## 2022-01-27 ENCOUNTER — LAB (OUTPATIENT)
Dept: LAB | Facility: CLINIC | Age: 62
End: 2022-01-27
Attending: INTERNAL MEDICINE
Payer: COMMERCIAL

## 2022-01-27 DIAGNOSIS — D47.1 MPN (MYELOPROLIFERATIVE NEOPLASM) (H): ICD-10-CM

## 2022-01-27 LAB
ALBUMIN SERPL-MCNC: 3.9 G/DL (ref 3.4–5)
ALP SERPL-CCNC: 68 U/L (ref 40–150)
ALT SERPL W P-5'-P-CCNC: 35 U/L (ref 0–50)
ANION GAP SERPL CALCULATED.3IONS-SCNC: <1 MMOL/L (ref 3–14)
AST SERPL W P-5'-P-CCNC: 24 U/L (ref 0–45)
BASOPHILS # BLD AUTO: 0.1 10E3/UL (ref 0–0.2)
BASOPHILS NFR BLD AUTO: 2 %
BILIRUB SERPL-MCNC: 0.3 MG/DL (ref 0.2–1.3)
BUN SERPL-MCNC: 11 MG/DL (ref 7–30)
CALCIUM SERPL-MCNC: 9 MG/DL (ref 8.5–10.1)
CHLORIDE BLD-SCNC: 107 MMOL/L (ref 94–109)
CO2 SERPL-SCNC: 30 MMOL/L (ref 20–32)
CREAT SERPL-MCNC: 0.53 MG/DL (ref 0.52–1.04)
EOSINOPHIL # BLD AUTO: 0.1 10E3/UL (ref 0–0.7)
EOSINOPHIL NFR BLD AUTO: 1 %
ERYTHROCYTE [DISTWIDTH] IN BLOOD BY AUTOMATED COUNT: 21 % (ref 10–15)
GFR SERPL CREATININE-BSD FRML MDRD: >90 ML/MIN/1.73M2
GLUCOSE BLD-MCNC: 105 MG/DL (ref 70–99)
HCT VFR BLD AUTO: 32.9 % (ref 35–47)
HGB BLD-MCNC: 10 G/DL (ref 11.7–15.7)
IMM GRANULOCYTES # BLD: 0.3 10E3/UL
IMM GRANULOCYTES NFR BLD: 4 %
LDH SERPL L TO P-CCNC: 462 U/L (ref 81–234)
LYMPHOCYTES # BLD AUTO: 2.1 10E3/UL (ref 0.8–5.3)
LYMPHOCYTES NFR BLD AUTO: 28 %
MCH RBC QN AUTO: 28.6 PG (ref 26.5–33)
MCHC RBC AUTO-ENTMCNC: 30.4 G/DL (ref 31.5–36.5)
MCV RBC AUTO: 94 FL (ref 78–100)
MONOCYTES # BLD AUTO: 0.7 10E3/UL (ref 0–1.3)
MONOCYTES NFR BLD AUTO: 9 %
NEUTROPHILS # BLD AUTO: 4.2 10E3/UL (ref 1.6–8.3)
NEUTROPHILS NFR BLD AUTO: 56 %
NRBC # BLD AUTO: 0.4 10E3/UL
NRBC BLD AUTO-RTO: 5 /100
PLATELET # BLD AUTO: 627 10E3/UL (ref 150–450)
POTASSIUM BLD-SCNC: 4.4 MMOL/L (ref 3.4–5.3)
PROT SERPL-MCNC: 7.4 G/DL (ref 6.8–8.8)
RBC # BLD AUTO: 3.5 10E6/UL (ref 3.8–5.2)
SODIUM SERPL-SCNC: 135 MMOL/L (ref 133–144)
WBC # BLD AUTO: 7.6 10E3/UL (ref 4–11)

## 2022-01-27 PROCEDURE — 83615 LACTATE (LD) (LDH) ENZYME: CPT

## 2022-01-27 PROCEDURE — 80053 COMPREHEN METABOLIC PANEL: CPT

## 2022-01-27 PROCEDURE — 36415 COLL VENOUS BLD VENIPUNCTURE: CPT

## 2022-01-27 PROCEDURE — 82040 ASSAY OF SERUM ALBUMIN: CPT

## 2022-01-27 PROCEDURE — 85025 COMPLETE CBC W/AUTO DIFF WBC: CPT

## 2022-01-27 NOTE — NURSING NOTE
Chief Complaint   Patient presents with     Lab Only     vpt blood draw     Venipuncture labs drawn from right arm.    Elaine Marmolejo MA

## 2022-02-10 ENCOUNTER — APPOINTMENT (OUTPATIENT)
Dept: LAB | Facility: CLINIC | Age: 62
End: 2022-02-10
Attending: INTERNAL MEDICINE
Payer: COMMERCIAL

## 2022-02-10 ENCOUNTER — ONCOLOGY VISIT (OUTPATIENT)
Dept: ONCOLOGY | Facility: CLINIC | Age: 62
End: 2022-02-10
Attending: INTERNAL MEDICINE
Payer: COMMERCIAL

## 2022-02-10 VITALS
HEART RATE: 87 BPM | DIASTOLIC BLOOD PRESSURE: 76 MMHG | WEIGHT: 192.1 LBS | BODY MASS INDEX: 29.64 KG/M2 | RESPIRATION RATE: 14 BRPM | OXYGEN SATURATION: 99 % | TEMPERATURE: 98.5 F | SYSTOLIC BLOOD PRESSURE: 132 MMHG

## 2022-02-10 DIAGNOSIS — K59.00 CONSTIPATION, UNSPECIFIED CONSTIPATION TYPE: Primary | ICD-10-CM

## 2022-02-10 DIAGNOSIS — D47.1 MPN (MYELOPROLIFERATIVE NEOPLASM) (H): ICD-10-CM

## 2022-02-10 LAB
ALBUMIN SERPL-MCNC: 3.5 G/DL (ref 3.4–5)
ALP SERPL-CCNC: 63 U/L (ref 40–150)
ALT SERPL W P-5'-P-CCNC: 27 U/L (ref 0–50)
ANION GAP SERPL CALCULATED.3IONS-SCNC: 6 MMOL/L (ref 3–14)
AST SERPL W P-5'-P-CCNC: 18 U/L (ref 0–45)
BASOPHILS # BLD AUTO: 0.1 10E3/UL (ref 0–0.2)
BASOPHILS NFR BLD AUTO: 1 %
BILIRUB SERPL-MCNC: 0.2 MG/DL (ref 0.2–1.3)
BUN SERPL-MCNC: 13 MG/DL (ref 7–30)
CALCIUM SERPL-MCNC: 9 MG/DL (ref 8.5–10.1)
CHLORIDE BLD-SCNC: 110 MMOL/L (ref 94–109)
CO2 SERPL-SCNC: 26 MMOL/L (ref 20–32)
CREAT SERPL-MCNC: 0.52 MG/DL (ref 0.52–1.04)
EOSINOPHIL # BLD AUTO: 0.1 10E3/UL (ref 0–0.7)
EOSINOPHIL NFR BLD AUTO: 1 %
ERYTHROCYTE [DISTWIDTH] IN BLOOD BY AUTOMATED COUNT: 21.4 % (ref 10–15)
GFR SERPL CREATININE-BSD FRML MDRD: >90 ML/MIN/1.73M2
GLUCOSE BLD-MCNC: 83 MG/DL (ref 70–99)
HCT VFR BLD AUTO: 31.6 % (ref 35–47)
HGB BLD-MCNC: 9.8 G/DL (ref 11.7–15.7)
IMM GRANULOCYTES # BLD: 0.2 10E3/UL
IMM GRANULOCYTES NFR BLD: 3 %
LDH SERPL L TO P-CCNC: 405 U/L (ref 81–234)
LYMPHOCYTES # BLD AUTO: 1.7 10E3/UL (ref 0.8–5.3)
LYMPHOCYTES NFR BLD AUTO: 25 %
MCH RBC QN AUTO: 29.2 PG (ref 26.5–33)
MCHC RBC AUTO-ENTMCNC: 31 G/DL (ref 31.5–36.5)
MCV RBC AUTO: 94 FL (ref 78–100)
MONOCYTES # BLD AUTO: 0.7 10E3/UL (ref 0–1.3)
MONOCYTES NFR BLD AUTO: 11 %
NEUTROPHILS # BLD AUTO: 4.1 10E3/UL (ref 1.6–8.3)
NEUTROPHILS NFR BLD AUTO: 59 %
NRBC # BLD AUTO: 0.3 10E3/UL
NRBC BLD AUTO-RTO: 4 /100
PLATELET # BLD AUTO: 609 10E3/UL (ref 150–450)
POTASSIUM BLD-SCNC: 4.3 MMOL/L (ref 3.4–5.3)
PROT SERPL-MCNC: 7.2 G/DL (ref 6.8–8.8)
RBC # BLD AUTO: 3.36 10E6/UL (ref 3.8–5.2)
SODIUM SERPL-SCNC: 142 MMOL/L (ref 133–144)
WBC # BLD AUTO: 6.9 10E3/UL (ref 4–11)

## 2022-02-10 PROCEDURE — 85025 COMPLETE CBC W/AUTO DIFF WBC: CPT | Performed by: INTERNAL MEDICINE

## 2022-02-10 PROCEDURE — 82040 ASSAY OF SERUM ALBUMIN: CPT | Performed by: INTERNAL MEDICINE

## 2022-02-10 PROCEDURE — 99215 OFFICE O/P EST HI 40 MIN: CPT | Performed by: INTERNAL MEDICINE

## 2022-02-10 PROCEDURE — 80053 COMPREHEN METABOLIC PANEL: CPT | Performed by: INTERNAL MEDICINE

## 2022-02-10 PROCEDURE — 36415 COLL VENOUS BLD VENIPUNCTURE: CPT | Performed by: INTERNAL MEDICINE

## 2022-02-10 PROCEDURE — 83615 LACTATE (LD) (LDH) ENZYME: CPT | Performed by: INTERNAL MEDICINE

## 2022-02-10 PROCEDURE — G0463 HOSPITAL OUTPT CLINIC VISIT: HCPCS

## 2022-02-10 RX ORDER — POLYETHYLENE GLYCOL 3350 17 G/17G
17 POWDER, FOR SOLUTION ORAL DAILY PRN
Qty: 510 G | Refills: 1 | Status: SHIPPED | OUTPATIENT
Start: 2022-02-10

## 2022-02-10 RX ORDER — HYDROXYUREA 500 MG/1
500 CAPSULE ORAL DAILY
Qty: 90 CAPSULE | Refills: 3 | Status: SHIPPED | OUTPATIENT
Start: 2022-02-10 | End: 2022-09-15

## 2022-02-10 ASSESSMENT — PAIN SCALES - GENERAL: PAINLEVEL: NO PAIN (0)

## 2022-02-10 NOTE — LETTER
2/10/2022         RE: Nancy Rodríguez  [address]  [address]  [place] [address]        Dear Colleague,    Thank you for referring your patient, Nancy Rodríguez, to the [facility]. Please see a copy of my visit note below.    Hematology follow up visit:  Date on this visit: 2/10/22     Nancy Rodríguez  is referred by Dr.Karen Fernandez for a hematology consultation. She requires evaluation for thrombocytosis/left shift WBC and blast on smear.    Primary Physician: Tasha Nick     History Of Present Illness:  Ms. Rodríguez is a 61 year old female who presents with thrombocytosis, anemia, left shifted WBC and blasts on smear.    I initially saw her on 9/28/2021.  Please see previous note for details.  I have copied and updated from prior note.    Looking back she has had thrombocytosis and anemia for a number of years.  On 8/20/2021, peripheral blood smear showed left shifted WBC with blast-like cells in addition to anemia and thrombocytosis.      She feels pain in the pelvic area and back pain. Sometimes feels leg are swollen. Right ankle is more swollen. It is not more swollen. This is going on for 4 months. She denies B symptoms. No abnormal bleeding, erythromelalgia, dyspnea. Denies significant dyspnea. Feels fatigued. No nausea, vomiting, diarrhea or constipation. Last mammogram was few years ago.      I did further work-up including bone marrow biopsy.    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  CALR L367fs positive.  Low level JAK2 gene variant: p.V617F (1.6%)  She has had thrombocytosis, anemia, left shifted WBC and has borderline splenomegaly.  LDH is 463.    Interval history.  She is here with her daughter.  A [facility] Interpretor is available.   She started taking Hydrea in mid December 2021.  She is taking it 500 mg daily.  She is also taking aspirin.  I also reviewed notes from Dr. Rincon.  She also was  evaluated at [facility].  Doing well. No B symptoms. No pain. Has some constipation.  No nausea or vomiting. No SOB or palpitations.    ROS:  Rest of the comprehensive review of the system was unremarkable.    I reviewed other history in epic as below.      Past Medical/Surgical History:  Past Medical History:   Diagnosis Date     Diabetes (H)      Headache      Nonsenile cataract      Past Surgical History:   Procedure Laterality Date     ANKLE SURGERY      left     Allergies:  Allergies as of 02/10/2022     (No Known Allergies)     Current Medications:  Current Outpatient Medications   Medication Sig Dispense Refill     aspirin (ASA) 81 MG EC tablet Take 1 tablet (81 mg) by mouth daily 90 tablet 3     atorvastatin (LIPITOR) 80 MG tablet        Calcium Carb-Cholecalciferol (CALCIUM 600 + D PO) Take 1 tablet by mouth daily       capsaicin (ZOSTRIX) 0.025 % external cream capsaicin 0.025 % topical cream   BALDEMAR 1 APPLICATION AA TID PRN P       cholecalciferol (VITAMIN D3) 125 mcg (5000 units) capsule TAKE 1 CAPSULE BY MOUTH ONCE DAILY       cyclobenzaprine (FLEXERIL) 10 MG tablet cyclobenzaprine 10 mg tablet   TK 1 T PO TID PRF MUSCLE SPASM       hydroxyurea (HYDREA) 500 MG capsule Take 1 capsule (500 mg) by mouth daily 30 capsule 3     ibuprofen (ADVIL/MOTRIN) 600 MG tablet        methocarbamol (ROBAXIN) 500 MG tablet TAKE 1 TABLET BY MOUTH TWICE DAILY AS NEEDED       omeprazole (PRILOSEC) 10 MG DR capsule        propranolol ER (INDERAL LA) 160 MG 24 hr capsule        QUETIAPINE FUMARATE PO Take 50 mg by mouth       venlafaxine (EFFEXOR-XR) 75 MG 24 hr capsule venlafaxine ER 75 mg capsule,extended release 24 hr   TAKE ONE CAPSULE BY MOUTH ONCE DAILY       ACCU-CHEK GUIDE test strip        ASPIRIN PO Take 81 mg by mouth daily       blood glucose (ACCU-CHEK FASTCLIX) lancing device 1 kit by Other route       blood glucose (NO BRAND SPECIFIED) test strip USE AS DIRECTED TO TEST BLOOD SUGAR TWICE DAILY       blood  glucose monitoring (ACCU-CHEK FASTCLIX) lancets        DOCUSATE SODIUM PO Take 100 mg by mouth daily (Patient not taking: Reported on 2/10/2022)       IBUPROFEN PO Take 600 mg by mouth as needed for moderate pain       magnesium hydroxide (MILK OF MAGNESIA) 400 MG/5ML suspension Take 800 mg by mouth (Patient not taking: Reported on 11/12/2021)       OMEPRAZOLE PO Take 20 mg by mouth every morning       SUMAtriptan (IMITREX) 50 MG tablet Take 50 mg by mouth (Patient not taking: Reported on 12/29/2021)       UNKNOWN TO PATIENT Pt states that she takes many medications, but is uncertain what they are for and what dosages they are.        Family History:  Family History   Problem Relation Age of Onset     Glaucoma No family hx of      Macular Degeneration No family hx of      No family history of bleeding or clotting disorder or cancers.    Social History:  Social History     Socioeconomic History     Marital status: Single     Spouse name: Not on file     Number of children: Not on file     Years of education: Not on file     Highest education level: Not on file   Occupational History     Not on file   Tobacco Use     Smoking status: Never Smoker     Smokeless tobacco: Never Used   Substance and Sexual Activity     Alcohol use: No     Alcohol/week: 0.0 standard drinks     Drug use: No     Sexual activity: Not on file   Other Topics Concern     Not on file   Social History Narrative     Not on file     Social Determinants of Health     Financial Resource Strain: Not on file   Food Insecurity: Not on file   Transportation Needs: Not on file   Physical Activity: Not on file   Stress: Not on file   Social Connections: Not on file   Intimate Partner Violence: Not At Risk     Fear of Current or Ex-Partner: No     Emotionally Abused: No     Physically Abused: No     Sexually Abused: No   Housing Stability: Not on file     Physical Exam:  /76   Pulse 87   Temp 98.5  F (36.9  C) (Oral)   Resp 14   Wt 87.1 kg (192 lb  1.6 oz)   SpO2 99%   BMI 29.64 kg/m       Wt Readings from Last 4 Encounters:   02/10/22 87.1 kg (192 lb 1.6 oz)   12/29/21 85.6 kg (188 lb 12.8 oz)   12/02/21 86.1 kg (189 lb 12.8 oz)   10/28/21 86.8 kg (191 lb 6.4 oz)       CONSTITUTIONAL: no acute distress  EYES: PERRLA, no palor or icterus.   ENT/MOUTH: no mouth lesions. Ears normal  CVS: s1s2 no m r g .   RESPIRATORY: clear to auscultation b/l  GI: soft non tender no hepatosplenomegaly  NEURO: AAOX3  Grossly non focal neuro exam  INTEGUMENT: no obvious rashes  LYMPHATIC: no palpable LAD  MUSCULOSKELETAL: Unremarkable. No bony tenderness.   EXTREMITIES: no edema  PSYCH: Mentation, mood and affect are normal. Decision making capacity is intact      Laboratory/Imaging Studies      Reviewed    2/10/2022 .    CBC showed WBC 7.6.  Nucleated red blood cells 4 per 100 WBC.        CMP unremarkable.    .    2/9/2022  LDH was 451.  Iron studies were unremarkable.  B12 was normal.  Erythropoietin 82.1.    Ultrasound of the abdomen on 2/9/2022 shows mildly enlarged spleen at 14.3 cm.    11/12/2021   bone marrow biopsy shows myeloproliferative neoplasm.  There is normocellular marrow with trilineage hematopoiesis and increased and atypical megakaryocytes and 1% blasts.  Mild to moderate increase in marrow reticulin fibrosis (MF 1-2 of 3) with features suggestive of osteosclerosis.  This is most likely essential thrombocytosis with early myelofibrosis.  A distinction between primary myelofibrosis and eating with early myelofibrosis is difficult.    Concurrent flow cytometry ([id number]) shows polytypic B cells, a small unusual T cell population similar to that detected in a recent peripheral blood flow cytometry study, no increase in myeloid blasts, and no abnormal myeloid blast population. There is no definitive morphologic or immunohistochemical correlate to the unusual T cells detected by flow cytometry, which may be present in the marrow or may reflect peripheral  blood contamination.     Cytogenetics is 46XX.            10/1/2021.  CBC shows WBC 6.1.  Hemoglobin 10.6.  Platelets 511.    Peripheral blood smear showed slight normochromic, normocytic anemia with occasional teardrop cells but circulating nucleated red blood cells with slight thrombocytosis and slight left shift neutrophilia.      Flow cytometry showed rare circulating CD34 positive myeloid blasts (1.3%), unusual CD8-positive CD57-positive T cells (1.6%) was present, and the B cells were polytypic.  The findings are not defintivie for a T cell lymphoproliferative disorder or a high grade myeloid neoplasm.    NGS panel shows CALR L367fs.  Low level JAK2 gene variant: p.V617F (1.6%) was again detected ( Off note in 2006, JAK2 mutation was detected by PCR. )        CT chest abdomen and pelvis on 10/22/2021 does not show any acute findings.  It showed borderline splenomegaly and nonspecific heterogenous sclerotic  appearance of the vertebrae and pelvic bones.    Bilateral lower extremity ultrasound on 10/22/2021 does not show evidence of DVT.          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  CALR L367fs positive.  Low level JAK2 gene variant: p.V617F (1.6%)  She has had thrombocytosis, anemia, left shifted WBC and has borderline splenomegaly.  LDH is 463.    She is started Hydrea 500 mg daily and she is also on aspirin.    I also reviewed notes from  from bone marrow transplant department whom she saw on 12/29/2021.  He also agrees with current management of aspirin and hydroxyurea.    Overall she is tolerating treatments well.  At this time my plan is to continue the same treatment.    She has anemia and thrombocytosis but overall these have been a stable.  We will check labs monthly and adjust the dose of Hydrea accordingly.      We did not address the following today    Pelvic/back pain and extreme fatigue- overall better. CT CAP was  essentially unremarkable except borderline splenomegaly     Leg swelling- R>L. US did not show any DVT- swelling has resolved.       See me back in 3 months.    Her questions were answered to her satisfaction.  She is agreeable and comfortable with the plan.    I spent 40 minutes on this visit on the date of service, including the face to face time, reviewing records and labs and imaging and placing new orders as well as coordination of care and documentation.          Again, thank you for allowing me to participate in the care of your patient.      Sincerely,    Eddie Duong MD

## 2022-02-10 NOTE — NURSING NOTE
Chief Complaint   Patient presents with     Blood Draw     Labs drawn via  by RN in lab. VS taken.      Labs drawn via venipuncture. Vital signs taken. Checked into next appointment.   Radha Garcia RN

## 2022-02-10 NOTE — PROGRESS NOTES
Hematology follow up visit:  Date on this visit: 2/10/22     Nancy Rodríguez  is referred by Dr.Karen Fernandez for a hematology consultation. She requires evaluation for thrombocytosis/left shift WBC and blast on smear.    Primary Physician: Tasha Nick     History Of Present Illness:  Ms. Rodríguez is a 61 year old female who presents with thrombocytosis, anemia, left shifted WBC and blasts on smear.    I initially saw her on 9/28/2021.  Please see previous note for details.  I have copied and updated from prior note.    Looking back she has had thrombocytosis and anemia for a number of years.  On 8/20/2021, peripheral blood smear showed left shifted WBC with blast-like cells in addition to anemia and thrombocytosis.      She feels pain in the pelvic area and back pain. Sometimes feels leg are swollen. Right ankle is more swollen. It is not more swollen. This is going on for 4 months. She denies B symptoms. No abnormal bleeding, erythromelalgia, dyspnea. Denies significant dyspnea. Feels fatigued. No nausea, vomiting, diarrhea or constipation. Last mammogram was few years ago.      I did further work-up including bone marrow biopsy.    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  CALR L367fs positive.  Low level JAK2 gene variant: p.V617F (1.6%)  She has had thrombocytosis, anemia, left shifted WBC and has borderline splenomegaly.  LDH is 463.    Interval history.  She is here with her daughter.  [facility].   She started taking Hydrea in mid December 2021.  She is taking it 500 mg daily.  She is also taking aspirin.  I also reviewed notes from Dr. Rincon.  She also was evaluated at [facility].  Doing well. No B symptoms. No pain. Has some constipation.  No nausea or vomiting. No SOB or palpitations.    ROS:  Rest of the comprehensive review of the system was unremarkable.    I reviewed other history in epic as  below.      Past Medical/Surgical History:  Past Medical History:   Diagnosis Date     Diabetes (H)      Headache      Nonsenile cataract      Past Surgical History:   Procedure Laterality Date     ANKLE SURGERY      left     Allergies:  Allergies as of 02/10/2022     (No Known Allergies)     Current Medications:  Current Outpatient Medications   Medication Sig Dispense Refill     aspirin (ASA) 81 MG EC tablet Take 1 tablet (81 mg) by mouth daily 90 tablet 3     atorvastatin (LIPITOR) 80 MG tablet        Calcium Carb-Cholecalciferol (CALCIUM 600 + D PO) Take 1 tablet by mouth daily       capsaicin (ZOSTRIX) 0.025 % external cream capsaicin 0.025 % topical cream   BALDEMAR 1 APPLICATION AA TID PRN P       cholecalciferol (VITAMIN D3) 125 mcg (5000 units) capsule TAKE 1 CAPSULE BY MOUTH ONCE DAILY       cyclobenzaprine (FLEXERIL) 10 MG tablet cyclobenzaprine 10 mg tablet   TK 1 T PO TID PRF MUSCLE SPASM       hydroxyurea (HYDREA) 500 MG capsule Take 1 capsule (500 mg) by mouth daily 30 capsule 3     ibuprofen (ADVIL/MOTRIN) 600 MG tablet        methocarbamol (ROBAXIN) 500 MG tablet TAKE 1 TABLET BY MOUTH TWICE DAILY AS NEEDED       omeprazole (PRILOSEC) 10 MG DR capsule        propranolol ER (INDERAL LA) 160 MG 24 hr capsule        QUETIAPINE FUMARATE PO Take 50 mg by mouth       venlafaxine (EFFEXOR-XR) 75 MG 24 hr capsule venlafaxine ER 75 mg capsule,extended release 24 hr   TAKE ONE CAPSULE BY MOUTH ONCE DAILY       ACCU-CHEK GUIDE test strip        ASPIRIN PO Take 81 mg by mouth daily       blood glucose (ACCU-CHEK FASTCLIX) lancing device 1 kit by Other route       blood glucose (NO BRAND SPECIFIED) test strip USE AS DIRECTED TO TEST BLOOD SUGAR TWICE DAILY       blood glucose monitoring (ACCU-CHEK FASTCLIX) lancets        DOCUSATE SODIUM PO Take 100 mg by mouth daily (Patient not taking: Reported on 2/10/2022)       IBUPROFEN PO Take 600 mg by mouth as needed for moderate pain       magnesium hydroxide (MILK OF  MAGNESIA) 400 MG/5ML suspension Take 800 mg by mouth (Patient not taking: Reported on 11/12/2021)       OMEPRAZOLE PO Take 20 mg by mouth every morning       SUMAtriptan (IMITREX) 50 MG tablet Take 50 mg by mouth (Patient not taking: Reported on 12/29/2021)       UNKNOWN TO PATIENT Pt states that she takes many medications, but is uncertain what they are for and what dosages they are.        Family History:  Family History   Problem Relation Age of Onset     Glaucoma No family hx of      Macular Degeneration No family hx of      No family history of bleeding or clotting disorder or cancers.    Social History:  Social History     Socioeconomic History     Marital status: Single     Spouse name: Not on file     Number of children: Not on file     Years of education: Not on file     Highest education level: Not on file   Occupational History     Not on file   Tobacco Use     Smoking status: Never Smoker     Smokeless tobacco: Never Used   Substance and Sexual Activity     Alcohol use: No     Alcohol/week: 0.0 standard drinks     Drug use: No     Sexual activity: Not on file   Other Topics Concern     Not on file   Social History Narrative     Not on file     Social Determinants of Health     Financial Resource Strain: Not on file   Food Insecurity: Not on file   Transportation Needs: Not on file   Physical Activity: Not on file   Stress: Not on file   Social Connections: Not on file   Intimate Partner Violence: Not At Risk     Fear of Current or Ex-Partner: No     Emotionally Abused: No     Physically Abused: No     Sexually Abused: No   Housing Stability: Not on file     Physical Exam:  /76   Pulse 87   Temp 98.5  F (36.9  C) (Oral)   Resp 14   Wt 87.1 kg (192 lb 1.6 oz)   SpO2 99%   BMI 29.64 kg/m       Wt Readings from Last 4 Encounters:   02/10/22 87.1 kg (192 lb 1.6 oz)   12/29/21 85.6 kg (188 lb 12.8 oz)   12/02/21 86.1 kg (189 lb 12.8 oz)   10/28/21 86.8 kg (191 lb 6.4 oz)       CONSTITUTIONAL: no  acute distress  EYES: PERRLA, no palor or icterus.   ENT/MOUTH: no mouth lesions. Ears normal  CVS: s1s2 no m r g .   RESPIRATORY: clear to auscultation b/l  GI: soft non tender no hepatosplenomegaly  NEURO: AAOX3  Grossly non focal neuro exam  INTEGUMENT: no obvious rashes  LYMPHATIC: no palpable LAD  MUSCULOSKELETAL: Unremarkable. No bony tenderness.   EXTREMITIES: no edema  PSYCH: Mentation, mood and affect are normal. Decision making capacity is intact      Laboratory/Imaging Studies      Reviewed    2/10/2022 .    CBC showed WBC 7.6.  Nucleated red blood cells 4 per 100 WBC.        CMP unremarkable.    .    2/9/2022  LDH was 451.  Iron studies were unremarkable.  B12 was normal.  Erythropoietin 82.1.    Ultrasound of the abdomen on 2/9/2022 shows mildly enlarged spleen at 14.3 cm.    11/12/2021   bone marrow biopsy shows myeloproliferative neoplasm.  There is normocellular marrow with trilineage hematopoiesis and increased and atypical megakaryocytes and 1% blasts.  Mild to moderate increase in marrow reticulin fibrosis (MF 1-2 of 3) with features suggestive of osteosclerosis.  This is most likely essential thrombocytosis with early myelofibrosis.  A distinction between primary myelofibrosis and eating with early myelofibrosis is difficult.    Concurrent flow cytometry ([id number]) shows polytypic B cells, a small unusual T cell population similar to that detected in a recent peripheral blood flow cytometry study, no increase in myeloid blasts, and no abnormal myeloid blast population. There is no definitive morphologic or immunohistochemical correlate to the unusual T cells detected by flow cytometry, which may be present in the marrow or may reflect peripheral blood contamination.     Cytogenetics is 46XX.            10/1/2021.  CBC shows WBC 6.1.  Hemoglobin 10.6.  Platelets 511.    Peripheral blood smear showed slight normochromic, normocytic anemia with occasional teardrop cells but circulating  nucleated red blood cells with slight thrombocytosis and slight left shift neutrophilia.      Flow cytometry showed rare circulating CD34 positive myeloid blasts (1.3%), unusual CD8-positive CD57-positive T cells (1.6%) was present, and the B cells were polytypic.  The findings are not defintivie for a T cell lymphoproliferative disorder or a high grade myeloid neoplasm.    NGS panel shows CALR L367fs.  Low level JAK2 gene variant: p.V617F (1.6%) was again detected ( Off note in 2006, JAK2 mutation was detected by PCR. )        CT chest abdomen and pelvis on 10/22/2021 does not show any acute findings.  It showed borderline splenomegaly and nonspecific heterogenous sclerotic  appearance of the vertebrae and pelvic bones.    Bilateral lower extremity ultrasound on 10/22/2021 does not show evidence of DVT.          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  CALR L367fs positive.  Low level JAK2 gene variant: p.V617F (1.6%)  She has had thrombocytosis, anemia, left shifted WBC and has borderline splenomegaly.  LDH is 463.    She is started Hydrea 500 mg daily and she is also on aspirin.    I also reviewed notes from  from bone marrow transplant department whom she saw on 12/29/2021.  He also agrees with current management of aspirin and hydroxyurea.    Overall she is tolerating treatments well.  At this time my plan is to continue the same treatment.    She has anemia and thrombocytosis but overall these have been a stable.  We will check labs monthly and adjust the dose of Hydrea accordingly.      We did not address the following today    Pelvic/back pain and extreme fatigue- overall better. CT CAP was essentially unremarkable except borderline splenomegaly     Leg swelling- R>L. US did not show any DVT- swelling has resolved.       See me back in 3 months.    Her questions were answered to her satisfaction.  She is agreeable and  comfortable with the plan.    Eddie Duong MD    I spent 40 minutes on this visit on the date of service, including the face to face time, reviewing records and labs and imaging and placing new orders as well as coordination of care and documentation.

## 2022-02-10 NOTE — NURSING NOTE
"Oncology Rooming Note    February 10, 2022 9:49 AM   Nancy Rodríguez is a 62 year old female who presents for:    Chief Complaint   Patient presents with     Blood Draw     Labs drawn via  by RN in lab. VS taken.      Oncology Clinic Visit     MPN     Initial Vitals: /76   Pulse 87   Temp 98.5  F (36.9  C) (Oral)   Resp 14   Wt 87.1 kg (192 lb 1.6 oz)   SpO2 99%   BMI 29.64 kg/m   Estimated body mass index is 29.64 kg/m  as calculated from the following:    Height as of 7/12/17: 1.715 m (5' 7.5\").    Weight as of this encounter: 87.1 kg (192 lb 1.6 oz). Body surface area is 2.04 meters squared.  No Pain (0) Comment: Data Unavailable   No LMP recorded. Patient is postmenopausal.  Allergies reviewed: Yes  Medications reviewed: Yes    Medications: Medication refills not needed today.  Pharmacy name entered into Node Management: [id number] - [place], [place] - [address] [facility] [address]    Clinical concerns: wondering if pt can have miralax       Aquilino Scales            "

## 2022-03-10 ENCOUNTER — LAB (OUTPATIENT)
Dept: LAB | Facility: CLINIC | Age: 62
End: 2022-03-10
Payer: COMMERCIAL

## 2022-03-10 DIAGNOSIS — D47.1 MPN (MYELOPROLIFERATIVE NEOPLASM) (H): ICD-10-CM

## 2022-03-10 LAB
ALBUMIN SERPL-MCNC: 3.7 G/DL (ref 3.4–5)
ALP SERPL-CCNC: 67 U/L (ref 40–150)
ALT SERPL W P-5'-P-CCNC: 32 U/L (ref 0–50)
ANION GAP SERPL CALCULATED.3IONS-SCNC: 9 MMOL/L (ref 3–14)
AST SERPL W P-5'-P-CCNC: 23 U/L (ref 0–45)
BASOPHILS # BLD AUTO: 0.1 10E3/UL (ref 0–0.2)
BASOPHILS NFR BLD AUTO: 1 %
BILIRUB SERPL-MCNC: 0.3 MG/DL (ref 0.2–1.3)
BUN SERPL-MCNC: 14 MG/DL (ref 7–30)
CALCIUM SERPL-MCNC: 9 MG/DL (ref 8.5–10.1)
CHLORIDE BLD-SCNC: 107 MMOL/L (ref 94–109)
CO2 SERPL-SCNC: 26 MMOL/L (ref 20–32)
CREAT SERPL-MCNC: 0.49 MG/DL (ref 0.52–1.04)
EOSINOPHIL # BLD AUTO: 0.1 10E3/UL (ref 0–0.7)
EOSINOPHIL NFR BLD AUTO: 1 %
ERYTHROCYTE [DISTWIDTH] IN BLOOD BY AUTOMATED COUNT: 21.2 % (ref 10–15)
GFR SERPL CREATININE-BSD FRML MDRD: >90 ML/MIN/1.73M2
GLUCOSE BLD-MCNC: 123 MG/DL (ref 70–99)
HCT VFR BLD AUTO: 30.8 % (ref 35–47)
HGB BLD-MCNC: 9.4 G/DL (ref 11.7–15.7)
IMM GRANULOCYTES # BLD: 0.1 10E3/UL
IMM GRANULOCYTES NFR BLD: 2 %
LDH SERPL L TO P-CCNC: 460 U/L (ref 81–234)
LYMPHOCYTES # BLD AUTO: 1.6 10E3/UL (ref 0.8–5.3)
LYMPHOCYTES NFR BLD AUTO: 24 %
MCH RBC QN AUTO: 29.6 PG (ref 26.5–33)
MCHC RBC AUTO-ENTMCNC: 30.5 G/DL (ref 31.5–36.5)
MCV RBC AUTO: 97 FL (ref 78–100)
MONOCYTES # BLD AUTO: 0.7 10E3/UL (ref 0–1.3)
MONOCYTES NFR BLD AUTO: 10 %
NEUTROPHILS # BLD AUTO: 4.2 10E3/UL (ref 1.6–8.3)
NEUTROPHILS NFR BLD AUTO: 62 %
NRBC # BLD AUTO: 0.3 10E3/UL
NRBC BLD AUTO-RTO: 5 /100
PLATELET # BLD AUTO: 631 10E3/UL (ref 150–450)
POTASSIUM BLD-SCNC: 3.8 MMOL/L (ref 3.4–5.3)
PROT SERPL-MCNC: 7.2 G/DL (ref 6.8–8.8)
RBC # BLD AUTO: 3.18 10E6/UL (ref 3.8–5.2)
SODIUM SERPL-SCNC: 142 MMOL/L (ref 133–144)
WBC # BLD AUTO: 6.7 10E3/UL (ref 4–11)

## 2022-03-10 PROCEDURE — 82040 ASSAY OF SERUM ALBUMIN: CPT

## 2022-03-10 PROCEDURE — 80053 COMPREHEN METABOLIC PANEL: CPT

## 2022-03-10 PROCEDURE — 85004 AUTOMATED DIFF WBC COUNT: CPT

## 2022-03-10 PROCEDURE — 36415 COLL VENOUS BLD VENIPUNCTURE: CPT

## 2022-03-10 PROCEDURE — 83615 LACTATE (LD) (LDH) ENZYME: CPT

## 2022-03-10 NOTE — NURSING NOTE
Chief Complaint   Patient presents with     Blood Draw     Labs drawn via  by RN     Labs collected from venipuncture by RN.       Ana Luisa Mcconnell RN

## 2022-04-07 ENCOUNTER — LAB (OUTPATIENT)
Dept: LAB | Facility: CLINIC | Age: 62
End: 2022-04-07
Payer: COMMERCIAL

## 2022-04-07 DIAGNOSIS — D47.1 MPN (MYELOPROLIFERATIVE NEOPLASM) (H): ICD-10-CM

## 2022-04-07 LAB
ALBUMIN SERPL-MCNC: 3.5 G/DL (ref 3.4–5)
ALP SERPL-CCNC: 70 U/L (ref 40–150)
ALT SERPL W P-5'-P-CCNC: 41 U/L (ref 0–50)
ANION GAP SERPL CALCULATED.3IONS-SCNC: 4 MMOL/L (ref 3–14)
AST SERPL W P-5'-P-CCNC: 28 U/L (ref 0–45)
BASOPHILS # BLD AUTO: 0 10E3/UL (ref 0–0.2)
BASOPHILS NFR BLD AUTO: 1 %
BILIRUB SERPL-MCNC: 0.2 MG/DL (ref 0.2–1.3)
BUN SERPL-MCNC: 13 MG/DL (ref 7–30)
CALCIUM SERPL-MCNC: 8.7 MG/DL (ref 8.5–10.1)
CHLORIDE BLD-SCNC: 108 MMOL/L (ref 94–109)
CO2 SERPL-SCNC: 27 MMOL/L (ref 20–32)
CREAT SERPL-MCNC: 0.58 MG/DL (ref 0.52–1.04)
EOSINOPHIL # BLD AUTO: 0.1 10E3/UL (ref 0–0.7)
EOSINOPHIL NFR BLD AUTO: 1 %
ERYTHROCYTE [DISTWIDTH] IN BLOOD BY AUTOMATED COUNT: 20.2 % (ref 10–15)
GFR SERPL CREATININE-BSD FRML MDRD: >90 ML/MIN/1.73M2
GLUCOSE BLD-MCNC: 117 MG/DL (ref 70–99)
HCT VFR BLD AUTO: 32 % (ref 35–47)
HGB BLD-MCNC: 9.7 G/DL (ref 11.7–15.7)
IMM GRANULOCYTES # BLD: 0.1 10E3/UL
IMM GRANULOCYTES NFR BLD: 1 %
LDH SERPL L TO P-CCNC: 454 U/L (ref 81–234)
LYMPHOCYTES # BLD AUTO: 1.6 10E3/UL (ref 0.8–5.3)
LYMPHOCYTES NFR BLD AUTO: 29 %
MCH RBC QN AUTO: 29.8 PG (ref 26.5–33)
MCHC RBC AUTO-ENTMCNC: 30.3 G/DL (ref 31.5–36.5)
MCV RBC AUTO: 98 FL (ref 78–100)
MONOCYTES # BLD AUTO: 0.6 10E3/UL (ref 0–1.3)
MONOCYTES NFR BLD AUTO: 11 %
NEUTROPHILS # BLD AUTO: 3.1 10E3/UL (ref 1.6–8.3)
NEUTROPHILS NFR BLD AUTO: 57 %
NRBC # BLD AUTO: 0.1 10E3/UL
NRBC BLD AUTO-RTO: 3 /100
PLATELET # BLD AUTO: 583 10E3/UL (ref 150–450)
POTASSIUM BLD-SCNC: 4 MMOL/L (ref 3.4–5.3)
PROT SERPL-MCNC: 7.3 G/DL (ref 6.8–8.8)
RBC # BLD AUTO: 3.26 10E6/UL (ref 3.8–5.2)
SODIUM SERPL-SCNC: 139 MMOL/L (ref 133–144)
WBC # BLD AUTO: 5.4 10E3/UL (ref 4–11)

## 2022-04-07 PROCEDURE — 36415 COLL VENOUS BLD VENIPUNCTURE: CPT

## 2022-04-07 PROCEDURE — 83615 LACTATE (LD) (LDH) ENZYME: CPT

## 2022-04-07 PROCEDURE — 85004 AUTOMATED DIFF WBC COUNT: CPT

## 2022-04-07 PROCEDURE — 80053 COMPREHEN METABOLIC PANEL: CPT

## 2022-04-07 NOTE — NURSING NOTE
Chief Complaint   Patient presents with     Blood Draw     Labs drawn via  by RN in lab.      Camille Melchor RN

## 2022-05-02 ENCOUNTER — TRANSCRIBE ORDERS (OUTPATIENT)
Dept: OTHER | Age: 62
End: 2022-05-02
Payer: COMMERCIAL

## 2022-05-02 DIAGNOSIS — E11.9 TYPE 2 DIABETES MELLITUS WITHOUT COMPLICATION, WITHOUT LONG-TERM CURRENT USE OF INSULIN (H): Primary | ICD-10-CM

## 2022-05-07 ENCOUNTER — HEALTH MAINTENANCE LETTER (OUTPATIENT)
Age: 62
End: 2022-05-07

## 2022-05-12 ENCOUNTER — ONCOLOGY VISIT (OUTPATIENT)
Dept: ONCOLOGY | Facility: CLINIC | Age: 62
End: 2022-05-12
Attending: INTERNAL MEDICINE
Payer: COMMERCIAL

## 2022-05-12 ENCOUNTER — LAB (OUTPATIENT)
Dept: LAB | Facility: CLINIC | Age: 62
End: 2022-05-12
Payer: COMMERCIAL

## 2022-05-12 VITALS
WEIGHT: 188.6 LBS | SYSTOLIC BLOOD PRESSURE: 144 MMHG | BODY MASS INDEX: 29.1 KG/M2 | RESPIRATION RATE: 16 BRPM | OXYGEN SATURATION: 100 % | DIASTOLIC BLOOD PRESSURE: 66 MMHG | HEART RATE: 77 BPM | TEMPERATURE: 98.9 F

## 2022-05-12 DIAGNOSIS — D47.1 MPN (MYELOPROLIFERATIVE NEOPLASM) (H): ICD-10-CM

## 2022-05-12 DIAGNOSIS — D47.1 MPN (MYELOPROLIFERATIVE NEOPLASM) (H): Primary | ICD-10-CM

## 2022-05-12 LAB
ALBUMIN SERPL-MCNC: 3.4 G/DL (ref 3.4–5)
ALP SERPL-CCNC: 80 U/L (ref 40–150)
ALT SERPL W P-5'-P-CCNC: 22 U/L (ref 0–50)
ANION GAP SERPL CALCULATED.3IONS-SCNC: 6 MMOL/L (ref 3–14)
AST SERPL W P-5'-P-CCNC: 19 U/L (ref 0–45)
BASOPHILS # BLD AUTO: 0 10E3/UL (ref 0–0.2)
BASOPHILS NFR BLD AUTO: 1 %
BILIRUB SERPL-MCNC: 0.2 MG/DL (ref 0.2–1.3)
BUN SERPL-MCNC: 6 MG/DL (ref 7–30)
CALCIUM SERPL-MCNC: 8.9 MG/DL (ref 8.5–10.1)
CHLORIDE BLD-SCNC: 108 MMOL/L (ref 94–109)
CO2 SERPL-SCNC: 27 MMOL/L (ref 20–32)
CREAT SERPL-MCNC: 0.51 MG/DL (ref 0.52–1.04)
EOSINOPHIL # BLD AUTO: 0.1 10E3/UL (ref 0–0.7)
EOSINOPHIL NFR BLD AUTO: 1 %
ERYTHROCYTE [DISTWIDTH] IN BLOOD BY AUTOMATED COUNT: 18.9 % (ref 10–15)
GFR SERPL CREATININE-BSD FRML MDRD: >90 ML/MIN/1.73M2
GLUCOSE BLD-MCNC: 137 MG/DL (ref 70–99)
HCT VFR BLD AUTO: 32.2 % (ref 35–47)
HGB BLD-MCNC: 9.9 G/DL (ref 11.7–15.7)
IMM GRANULOCYTES # BLD: 0.1 10E3/UL
IMM GRANULOCYTES NFR BLD: 2 %
LDH SERPL L TO P-CCNC: 472 U/L (ref 81–234)
LYMPHOCYTES # BLD AUTO: 1.7 10E3/UL (ref 0.8–5.3)
LYMPHOCYTES NFR BLD AUTO: 30 %
MCH RBC QN AUTO: 29.6 PG (ref 26.5–33)
MCHC RBC AUTO-ENTMCNC: 30.7 G/DL (ref 31.5–36.5)
MCV RBC AUTO: 96 FL (ref 78–100)
MONOCYTES # BLD AUTO: 0.6 10E3/UL (ref 0–1.3)
MONOCYTES NFR BLD AUTO: 10 %
NEUTROPHILS # BLD AUTO: 3.3 10E3/UL (ref 1.6–8.3)
NEUTROPHILS NFR BLD AUTO: 56 %
NRBC # BLD AUTO: 0.2 10E3/UL
NRBC BLD AUTO-RTO: 3 /100
PLATELET # BLD AUTO: 542 10E3/UL (ref 150–450)
POTASSIUM BLD-SCNC: 3.9 MMOL/L (ref 3.4–5.3)
PROT SERPL-MCNC: 7.1 G/DL (ref 6.8–8.8)
RBC # BLD AUTO: 3.34 10E6/UL (ref 3.8–5.2)
SODIUM SERPL-SCNC: 141 MMOL/L (ref 133–144)
WBC # BLD AUTO: 5.9 10E3/UL (ref 4–11)

## 2022-05-12 PROCEDURE — 36415 COLL VENOUS BLD VENIPUNCTURE: CPT

## 2022-05-12 PROCEDURE — 85025 COMPLETE CBC W/AUTO DIFF WBC: CPT

## 2022-05-12 PROCEDURE — 83615 LACTATE (LD) (LDH) ENZYME: CPT

## 2022-05-12 PROCEDURE — 99214 OFFICE O/P EST MOD 30 MIN: CPT | Performed by: INTERNAL MEDICINE

## 2022-05-12 PROCEDURE — G0463 HOSPITAL OUTPT CLINIC VISIT: HCPCS

## 2022-05-12 PROCEDURE — 80053 COMPREHEN METABOLIC PANEL: CPT

## 2022-05-12 ASSESSMENT — PAIN SCALES - GENERAL: PAINLEVEL: NO PAIN (0)

## 2022-05-12 NOTE — PROGRESS NOTES
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  CALR L367fs positive.  Low level JAK2 gene variant: p.V617F (1.6%)    Primary Physician: Tasha Nick     History Of Present Illness:  Ms. Rodríguez is a 62 year old female who presents with thrombocytosis, anemia, left shifted WBC and blasts on smear.    I initially saw her on 9/28/2021.  Please see previous note for details.  I have copied and updated from prior note.    Looking back she has had thrombocytosis and anemia for a number of years.  On 8/20/2021, peripheral blood smear showed left shifted WBC with blast-like cells in addition to anemia and thrombocytosis.      She feels pain in the pelvic area and back pain. Sometimes feels leg are swollen. Right ankle is more swollen. It is not more swollen. This is going on for 4 months. She denies B symptoms. No abnormal bleeding, erythromelalgia, dyspnea. Denies significant dyspnea. Feels fatigued. No nausea, vomiting, diarrhea or constipation. Last mammogram was few years ago.      I did further work-up including bone marrow biopsy.    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  CALR L367fs positive.  Low level JAK2 gene variant: p.V617F (1.6%)  She has had thrombocytosis, anemia, left shifted WBC and has borderline splenomegaly.  LDH is 463.      She started taking Hydrea 500 mg daily in mid December 2021.       Interval history.  She is here with her daughter.  A [facility] Interpretor is available.   She continues to be on Hydrea 500 mg daily.  She is also on aspirin daily.   She is tolerating this nicely.  Denies any new swellings or infections of GI problems.  Energy is good.  No pain.  No erythromelalgia.    ROS:  Rest of the comprehensive review of the system was unremarkable.    I reviewed other history in epic as below.      Past  Medical/Surgical History:  Past Medical History:   Diagnosis Date     Diabetes (H)      Headache      Nonsenile cataract      Past Surgical History:   Procedure Laterality Date     ANKLE SURGERY      left     Allergies:  Allergies as of 05/12/2022     (No Known Allergies)     Current Medications:  Current Outpatient Medications   Medication Sig Dispense Refill     aspirin (ASA) 81 MG EC tablet Take 1 tablet (81 mg) by mouth daily 90 tablet 3     atorvastatin (LIPITOR) 80 MG tablet        blood glucose (ACCU-CHEK FASTCLIX) lancing device 1 kit by Other route       blood glucose (NO BRAND SPECIFIED) test strip USE AS DIRECTED TO TEST BLOOD SUGAR TWICE DAILY       blood glucose monitoring (ACCU-CHEK FASTCLIX) lancets        Calcium Carb-Cholecalciferol (CALCIUM 600 + D PO) Take 1 tablet by mouth daily       capsaicin (ZOSTRIX) 0.025 % external cream capsaicin 0.025 % topical cream   BALDEMAR 1 APPLICATION AA TID PRN P       cholecalciferol (VITAMIN D3) 125 mcg (5000 units) capsule TAKE 1 CAPSULE BY MOUTH ONCE DAILY       cyclobenzaprine (FLEXERIL) 10 MG tablet cyclobenzaprine 10 mg tablet   TK 1 T PO TID PRF MUSCLE SPASM       DOCUSATE SODIUM PO Take 100 mg by mouth daily       hydroxyurea (HYDREA) 500 MG capsule Take 1 capsule (500 mg) by mouth daily 90 capsule 3     ibuprofen (ADVIL/MOTRIN) 600 MG tablet        methocarbamol (ROBAXIN) 500 MG tablet TAKE 1 TABLET BY MOUTH TWICE DAILY AS NEEDED       omeprazole (PRILOSEC) 10 MG DR capsule        polyethylene glycol (MIRALAX) 17 GM/Dose powder Take 17 g by mouth daily as needed for constipation 510 g 1     propranolol ER (INDERAL LA) 160 MG 24 hr capsule        QUETIAPINE FUMARATE PO Take 50 mg by mouth       SUMAtriptan (IMITREX) 50 MG tablet Take 50 mg by mouth       venlafaxine (EFFEXOR-XR) 75 MG 24 hr capsule venlafaxine ER 75 mg capsule,extended release 24 hr   TAKE ONE CAPSULE BY MOUTH ONCE DAILY       ACCU-CHEK GUIDE test strip        ASPIRIN PO Take 81 mg by mouth  daily       IBUPROFEN PO Take 600 mg by mouth as needed for moderate pain       OMEPRAZOLE PO Take 20 mg by mouth every morning       UNKNOWN TO PATIENT Pt states that she takes many medications, but is uncertain what they are for and what dosages they are.        Family History:  Family History   Problem Relation Age of Onset     Glaucoma No family hx of      Macular Degeneration No family hx of      No family history of bleeding or clotting disorder or cancers.    Social History:  Social History     Socioeconomic History     Marital status: Single     Spouse name: Not on file     Number of children: Not on file     Years of education: Not on file     Highest education level: Not on file   Occupational History     Not on file   Tobacco Use     Smoking status: Never Smoker     Smokeless tobacco: Never Used   Substance and Sexual Activity     Alcohol use: No     Alcohol/week: 0.0 standard drinks     Drug use: No     Sexual activity: Not on file   Other Topics Concern     Not on file   Social History Narrative     Not on file     Social Determinants of Health     Financial Resource Strain: Not on file   Food Insecurity: Not on file   Transportation Needs: Not on file   Physical Activity: Not on file   Stress: Not on file   Social Connections: Not on file   Intimate Partner Violence: Not At Risk     Fear of Current or Ex-Partner: No     Emotionally Abused: No     Physically Abused: No     Sexually Abused: No   Housing Stability: Not on file     Physical Exam:  There were no vitals taken for this visit.     Wt Readings from Last 4 Encounters:   05/12/22 85.5 kg (188 lb 9.6 oz)   02/10/22 87.1 kg (192 lb 1.6 oz)   12/29/21 85.6 kg (188 lb 12.8 oz)   12/02/21 86.1 kg (189 lb 12.8 oz)       CONSTITUTIONAL: No apparent distress  EYES: PERRLA, without pallor or jaundice  ENT/MOUTH: Ears unremarkable. No oral lesions  CVS: s1s2 normal  RESPIRATORY: Chest is clear  GI: Abdomen is benign  NEURO: Alert and oriented  ×3  INTEGUMENT: no concerning skin rashes   LYMPHATIC: no palpable lymphadenopathy  MUSCULOSKELETAL: Unremarkable. No bony tenderness.   EXTREMITIES: no pedal edema  PSYCH: Mentation, mood and affect are appropriate        Laboratory/Imaging Studies      Reviewed    5/12/2022 .    CBC showed WBC 5.9.  Hemoglobin 9.9.   Platelets 542.  ANC 3.3.  7.6.  Nucleated red blood cells 3 per 100 WBC.        CMP unremarkable.    .    2/9/2022  LDH was 451.  Iron studies were unremarkable.  B12 was normal.  Erythropoietin 82.1.    Ultrasound of the abdomen on 2/9/2022 shows mildly enlarged spleen at 14.3 cm.    11/12/2021   bone marrow biopsy shows myeloproliferative neoplasm.  There is normocellular marrow with trilineage hematopoiesis and increased and atypical megakaryocytes and 1% blasts.  Mild to moderate increase in marrow reticulin fibrosis (MF 1-2 of 3) with features suggestive of osteosclerosis.  This is most likely essential thrombocytosis with early myelofibrosis.  A distinction between primary myelofibrosis and eating with early myelofibrosis is difficult.    Concurrent flow cytometry ([id number]) shows polytypic B cells, a small unusual T cell population similar to that detected in a recent peripheral blood flow cytometry study, no increase in myeloid blasts, and no abnormal myeloid blast population. There is no definitive morphologic or immunohistochemical correlate to the unusual T cells detected by flow cytometry, which may be present in the marrow or may reflect peripheral blood contamination.     Cytogenetics is 46XX.      Flow cytometry showed rare circulating CD34 positive myeloid blasts (1.3%), unusual CD8-positive CD57-positive T cells (1.6%) was present, and the B cells were polytypic.  The findings are not defintivie for a T cell lymphoproliferative disorder or a high grade myeloid neoplasm.    NGS panel shows CALR L367fs.  Low level JAK2 gene variant: p.V617F (1.6%) was again detected ( Off note  in 2006, JAK2 mutation was detected by PCR. )        CT chest abdomen and pelvis on 10/22/2021 does not show any acute findings.  It showed borderline splenomegaly and nonspecific heterogenous sclerotic  appearance of the vertebrae and pelvic bones.    Bilateral lower extremity ultrasound on 10/22/2021 does not show evidence of DVT.          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  CALR L367fs positive.  Low level JAK2 gene variant: p.V617F (1.6%)  She has had thrombocytosis, anemia, left shifted WBC and has borderline splenomegaly.  LDH is 463.    She has been on Hydrea 500 mg since mid December 2021.  She is also taking aspirin.     Overall tolerating the treatment well and labs are stable.  She has stable anemia and mild thrombocytosis.  We will continue the same treatment.  Considering the stability of her labs I will now check labs every 2 months.        She was also evaluated by  from bone marrow transplant department whom she saw on 12/29/2021.  He also agrees with current management of aspirin and hydroxyurea.      We did not address the following today    Pelvic/back pain and extreme fatigue- overall better. CT CAP was essentially unremarkable except borderline splenomegaly     Leg swelling- R>L. US did not show any DVT- swelling has resolved.       Labs every 2 months and see me back in 4 months.    Her questions were answered to her satisfaction.  She is agreeable and comfortable with the plan.    Eddie Duong MD

## 2022-05-12 NOTE — NURSING NOTE
Chief Complaint   Patient presents with     Blood Draw     Labs drawn via  by RN in lab.  VS taken       Labs collected from venipuncture by RN. Vitals taken. Checked in for appointment(s).    Krystle Elder RN

## 2022-05-12 NOTE — LETTER
5/12/2022         RE: Nancy Rodríguez  [address]  [address]  [facility] [address]        Dear Colleague,    Thank you for referring your patient, Nancy Rodríguez, to the [facility]. Please see a copy of my visit note below.    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  CALR L367fs positive.  Low level JAK2 gene variant: p.V617F (1.6%)    Primary Physician: Tasha Nick     History Of Present Illness:  Ms. Rodríguez is a 62 year old female who presents with thrombocytosis, anemia, left shifted WBC and blasts on smear.    I initially saw her on 9/28/2021.  Please see previous note for details.  I have copied and updated from prior note.    Looking back she has had thrombocytosis and anemia for a number of years.  On 8/20/2021, peripheral blood smear showed left shifted WBC with blast-like cells in addition to anemia and thrombocytosis.      She feels pain in the pelvic area and back pain. Sometimes feels leg are swollen. Right ankle is more swollen. It is not more swollen. This is going on for 4 months. She denies B symptoms. No abnormal bleeding, erythromelalgia, dyspnea. Denies significant dyspnea. Feels fatigued. No nausea, vomiting, diarrhea or constipation. Last mammogram was few years ago.      I did further work-up including bone marrow biopsy.    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  CALR L367fs positive.  Low level JAK2 gene variant: p.V617F (1.6%)  She has had thrombocytosis, anemia, left shifted WBC and has borderline splenomegaly.  LDH is 463.      She started taking Hydrea 500 mg daily in mid December 2021.       Interval history.  She is here with her daughter.  A [facility] Interpretor is available.   She continues to be on Hydrea 500 mg daily.  She is also on aspirin daily.    She is tolerating this nicely.  Denies any new swellings or infections of GI problems.  Energy is good.  No pain.  No erythromelalgia.    ROS:  Rest of the comprehensive review of the system was unremarkable.    I reviewed other history in epic as below.      Past Medical/Surgical History:  Past Medical History:   Diagnosis Date     Diabetes (H)      Headache      Nonsenile cataract      Past Surgical History:   Procedure Laterality Date     ANKLE SURGERY      left     Allergies:  Allergies as of 05/12/2022     (No Known Allergies)     Current Medications:  Current Outpatient Medications   Medication Sig Dispense Refill     aspirin (ASA) 81 MG EC tablet Take 1 tablet (81 mg) by mouth daily 90 tablet 3     atorvastatin (LIPITOR) 80 MG tablet        blood glucose (ACCU-CHEK FASTCLIX) lancing device 1 kit by Other route       blood glucose (NO BRAND SPECIFIED) test strip USE AS DIRECTED TO TEST BLOOD SUGAR TWICE DAILY       blood glucose monitoring (ACCU-CHEK FASTCLIX) lancets        Calcium Carb-Cholecalciferol (CALCIUM 600 + D PO) Take 1 tablet by mouth daily       capsaicin (ZOSTRIX) 0.025 % external cream capsaicin 0.025 % topical cream   BALDEMAR 1 APPLICATION AA TID PRN P       cholecalciferol (VITAMIN D3) 125 mcg (5000 units) capsule TAKE 1 CAPSULE BY MOUTH ONCE DAILY       cyclobenzaprine (FLEXERIL) 10 MG tablet cyclobenzaprine 10 mg tablet   TK 1 T PO TID PRF MUSCLE SPASM       DOCUSATE SODIUM PO Take 100 mg by mouth daily       hydroxyurea (HYDREA) 500 MG capsule Take 1 capsule (500 mg) by mouth daily 90 capsule 3     ibuprofen (ADVIL/MOTRIN) 600 MG tablet        methocarbamol (ROBAXIN) 500 MG tablet TAKE 1 TABLET BY MOUTH TWICE DAILY AS NEEDED       omeprazole (PRILOSEC) 10 MG DR capsule        polyethylene glycol (MIRALAX) 17 GM/Dose powder Take 17 g by mouth daily as needed for constipation 510 g 1     propranolol ER (INDERAL LA) 160 MG 24 hr capsule        QUETIAPINE FUMARATE PO Take 50 mg by mouth        SUMAtriptan (IMITREX) 50 MG tablet Take 50 mg by mouth       venlafaxine (EFFEXOR-XR) 75 MG 24 hr capsule venlafaxine ER 75 mg capsule,extended release 24 hr   TAKE ONE CAPSULE BY MOUTH ONCE DAILY       ACCU-CHEK GUIDE test strip        ASPIRIN PO Take 81 mg by mouth daily       IBUPROFEN PO Take 600 mg by mouth as needed for moderate pain       OMEPRAZOLE PO Take 20 mg by mouth every morning       UNKNOWN TO PATIENT Pt states that she takes many medications, but is uncertain what they are for and what dosages they are.        Family History:  Family History   Problem Relation Age of Onset     Glaucoma No family hx of      Macular Degeneration No family hx of      No family history of bleeding or clotting disorder or cancers.    Social History:  Social History     Socioeconomic History     Marital status: Single     Spouse name: Not on file     Number of children: Not on file     Years of education: Not on file     Highest education level: Not on file   Occupational History     Not on file   Tobacco Use     Smoking status: Never Smoker     Smokeless tobacco: Never Used   Substance and Sexual Activity     Alcohol use: No     Alcohol/week: 0.0 standard drinks     Drug use: No     Sexual activity: Not on file   Other Topics Concern     Not on file   Social History Narrative     Not on file     Social Determinants of Health     Financial Resource Strain: Not on file   Food Insecurity: Not on file   Transportation Needs: Not on file   Physical Activity: Not on file   Stress: Not on file   Social Connections: Not on file   Intimate Partner Violence: Not At Risk     Fear of Current or Ex-Partner: No     Emotionally Abused: No     Physically Abused: No     Sexually Abused: No   Housing Stability: Not on file     Physical Exam:  There were no vitals taken for this visit.     Wt Readings from Last 4 Encounters:   05/12/22 85.5 kg (188 lb 9.6 oz)   02/10/22 87.1 kg (192 lb 1.6 oz)   12/29/21 85.6 kg (188 lb 12.8 oz)   12/02/21  86.1 kg (189 lb 12.8 oz)       CONSTITUTIONAL: No apparent distress  EYES: PERRLA, without pallor or jaundice  ENT/MOUTH: Ears unremarkable. No oral lesions  CVS: s1s2 normal  RESPIRATORY: Chest is clear  GI: Abdomen is benign  NEURO: Alert and oriented ×3  INTEGUMENT: no concerning skin rashes   LYMPHATIC: no palpable lymphadenopathy  MUSCULOSKELETAL: Unremarkable. No bony tenderness.   EXTREMITIES: no pedal edema  PSYCH: Mentation, mood and affect are appropriate        Laboratory/Imaging Studies      Reviewed    5/12/2022 .    CBC showed WBC 5.9.  Hemoglobin 9.9.   Platelets 542.  ANC 3.3.  7.6.  Nucleated red blood cells 3 per 100 WBC.        CMP unremarkable.    .    2/9/2022  LDH was 451.  Iron studies were unremarkable.  B12 was normal.  Erythropoietin 82.1.    Ultrasound of the abdomen on 2/9/2022 shows mildly enlarged spleen at 14.3 cm.    11/12/2021   bone marrow biopsy shows myeloproliferative neoplasm.  There is normocellular marrow with trilineage hematopoiesis and increased and atypical megakaryocytes and 1% blasts.  Mild to moderate increase in marrow reticulin fibrosis (MF 1-2 of 3) with features suggestive of osteosclerosis.  This is most likely essential thrombocytosis with early myelofibrosis.  A distinction between primary myelofibrosis and eating with early myelofibrosis is difficult.    Concurrent flow cytometry ([id number]) shows polytypic B cells, a small unusual T cell population similar to that detected in a recent peripheral blood flow cytometry study, no increase in myeloid blasts, and no abnormal myeloid blast population. There is no definitive morphologic or immunohistochemical correlate to the unusual T cells detected by flow cytometry, which may be present in the marrow or may reflect peripheral blood contamination.     Cytogenetics is 46XX.      Flow cytometry showed rare circulating CD34 positive myeloid blasts (1.3%), unusual CD8-positive CD57-positive T cells (1.6%) was  present, and the B cells were polytypic.  The findings are not defintivie for a T cell lymphoproliferative disorder or a high grade myeloid neoplasm.    NGS panel shows CALR L367fs.  Low level JAK2 gene variant: p.V617F (1.6%) was again detected ( Off note in 2006, JAK2 mutation was detected by PCR. )        CT chest abdomen and pelvis on 10/22/2021 does not show any acute findings.  It showed borderline splenomegaly and nonspecific heterogenous sclerotic  appearance of the vertebrae and pelvic bones.    Bilateral lower extremity ultrasound on 10/22/2021 does not show evidence of DVT.          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  CALR L367fs positive.  Low level JAK2 gene variant: p.V617F (1.6%)  She has had thrombocytosis, anemia, left shifted WBC and has borderline splenomegaly.  LDH is 463.    She has been on Hydrea 500 mg since mid December 2021.  She is also taking aspirin.     Overall tolerating the treatment well and labs are stable.  She has stable anemia and mild thrombocytosis.  We will continue the same treatment.  Considering the stability of her labs I will now check labs every 2 months.        She was also evaluated by  from bone marrow transplant department whom she saw on 12/29/2021.  He also agrees with current management of aspirin and hydroxyurea.      We did not address the following today    Pelvic/back pain and extreme fatigue- overall better. CT CAP was essentially unremarkable except borderline splenomegaly     Leg swelling- R>L. US did not show any DVT- swelling has resolved.       Labs every 2 months and see me back in 4 months.    Her questions were answered to her satisfaction.  She is agreeable and comfortable with the plan.      Eddie Duong MD

## 2022-05-12 NOTE — NURSING NOTE
"Oncology Rooming Note    May 12, 2022 9:57 AM   Nancy Rodríguez is a 62 year old female who presents for:    Chief Complaint   Patient presents with     Oncology Clinic Visit     Rtn for myeloproliferative neoplasm     Initial Vitals: There were no vitals taken for this visit. Estimated body mass index is 29.1 kg/m  as calculated from the following:    Height as of 7/12/17: 1.715 m (5' 7.5\").    Weight as of an earlier encounter on 5/12/22: 85.5 kg (188 lb 9.6 oz). There is no height or weight on file to calculate BSA.  Data Unavailable Comment: Data Unavailable   No LMP recorded. Patient is postmenopausal.  Allergies reviewed: Yes  Medications reviewed: Yes    Medications: MEDICATION REFILLS NEEDED TODAY. Provider was notified.     Pt would like to know if MD can put in refill orders for all medications, including ones prescribed by other providers, as she wont see other doctor for a long time. Pt was told that she could also submit refill requests through [employer] for other providers, but if possible to submit through us they would prefer.    Pharmacy name entered into [employer]: "[facility] #[id number] - [place], [place] - [address] [facility] [address]    Clinical concerns: none       Jackelyn Chavarria, EMT            "

## 2022-07-14 ENCOUNTER — LAB (OUTPATIENT)
Dept: LAB | Facility: CLINIC | Age: 62
End: 2022-07-14
Payer: COMMERCIAL

## 2022-07-14 DIAGNOSIS — D47.1 MPN (MYELOPROLIFERATIVE NEOPLASM) (H): ICD-10-CM

## 2022-07-14 LAB
ALBUMIN SERPL-MCNC: 3.6 G/DL (ref 3.4–5)
ALP SERPL-CCNC: 83 U/L (ref 40–150)
ALT SERPL W P-5'-P-CCNC: 25 U/L (ref 0–50)
ANION GAP SERPL CALCULATED.3IONS-SCNC: 3 MMOL/L (ref 3–14)
AST SERPL W P-5'-P-CCNC: 18 U/L (ref 0–45)
BILIRUB SERPL-MCNC: 0.3 MG/DL (ref 0.2–1.3)
BUN SERPL-MCNC: 8 MG/DL (ref 7–30)
CALCIUM SERPL-MCNC: 9.1 MG/DL (ref 8.5–10.1)
CHLORIDE BLD-SCNC: 109 MMOL/L (ref 94–109)
CO2 SERPL-SCNC: 27 MMOL/L (ref 20–32)
CREAT SERPL-MCNC: 0.46 MG/DL (ref 0.52–1.04)
GFR SERPL CREATININE-BSD FRML MDRD: >90 ML/MIN/1.73M2
GLUCOSE BLD-MCNC: 149 MG/DL (ref 70–99)
LDH SERPL L TO P-CCNC: 465 U/L (ref 81–234)
POTASSIUM BLD-SCNC: 4.2 MMOL/L (ref 3.4–5.3)
PROT SERPL-MCNC: 7.2 G/DL (ref 6.8–8.8)
SODIUM SERPL-SCNC: 139 MMOL/L (ref 133–144)

## 2022-07-14 PROCEDURE — 36415 COLL VENOUS BLD VENIPUNCTURE: CPT

## 2022-07-14 PROCEDURE — 82040 ASSAY OF SERUM ALBUMIN: CPT

## 2022-07-14 PROCEDURE — 80053 COMPREHEN METABOLIC PANEL: CPT

## 2022-07-14 PROCEDURE — 83615 LACTATE (LD) (LDH) ENZYME: CPT

## 2022-07-14 NOTE — NURSING NOTE
Chief Complaint   Patient presents with     Labs Only     Labs drawn via  by RN.     Labs drawn with  by RN.     Ana Cummings RN

## 2022-08-27 ENCOUNTER — HEALTH MAINTENANCE LETTER (OUTPATIENT)
Age: 62
End: 2022-08-27

## 2022-09-15 ENCOUNTER — ONCOLOGY VISIT (OUTPATIENT)
Dept: ONCOLOGY | Facility: CLINIC | Age: 62
End: 2022-09-15
Attending: INTERNAL MEDICINE
Payer: COMMERCIAL

## 2022-09-15 ENCOUNTER — APPOINTMENT (OUTPATIENT)
Dept: LAB | Facility: CLINIC | Age: 62
End: 2022-09-15
Payer: COMMERCIAL

## 2022-09-15 VITALS
TEMPERATURE: 98.2 F | SYSTOLIC BLOOD PRESSURE: 146 MMHG | DIASTOLIC BLOOD PRESSURE: 66 MMHG | WEIGHT: 195.1 LBS | RESPIRATION RATE: 16 BRPM | BODY MASS INDEX: 30.11 KG/M2 | OXYGEN SATURATION: 100 % | HEART RATE: 80 BPM

## 2022-09-15 DIAGNOSIS — D47.1 MPN (MYELOPROLIFERATIVE NEOPLASM) (H): ICD-10-CM

## 2022-09-15 DIAGNOSIS — K21.9 GASTROESOPHAGEAL REFLUX DISEASE WITHOUT ESOPHAGITIS: Primary | ICD-10-CM

## 2022-09-15 LAB
ALBUMIN SERPL BCG-MCNC: 4.1 G/DL (ref 3.5–5.2)
ALP SERPL-CCNC: 77 U/L (ref 35–104)
ALT SERPL W P-5'-P-CCNC: 16 U/L (ref 10–35)
ANION GAP SERPL CALCULATED.3IONS-SCNC: 9 MMOL/L (ref 7–15)
AST SERPL W P-5'-P-CCNC: 27 U/L (ref 10–35)
BASOPHILS # BLD AUTO: 0.1 10E3/UL (ref 0–0.2)
BASOPHILS NFR BLD AUTO: 1 %
BILIRUB SERPL-MCNC: 0.2 MG/DL
BUN SERPL-MCNC: 11.6 MG/DL (ref 8–23)
CALCIUM SERPL-MCNC: 9.3 MG/DL (ref 8.8–10.2)
CHLORIDE SERPL-SCNC: 104 MMOL/L (ref 98–107)
CREAT SERPL-MCNC: 0.42 MG/DL (ref 0.51–0.95)
DEPRECATED HCO3 PLAS-SCNC: 26 MMOL/L (ref 22–29)
EOSINOPHIL # BLD AUTO: 0.1 10E3/UL (ref 0–0.7)
EOSINOPHIL NFR BLD AUTO: 1 %
ERYTHROCYTE [DISTWIDTH] IN BLOOD BY AUTOMATED COUNT: 18.8 % (ref 10–15)
GFR SERPL CREATININE-BSD FRML MDRD: >90 ML/MIN/1.73M2
GLUCOSE SERPL-MCNC: 111 MG/DL (ref 70–99)
HCT VFR BLD AUTO: 33.5 % (ref 35–47)
HGB BLD-MCNC: 10.5 G/DL (ref 11.7–15.7)
IMM GRANULOCYTES # BLD: 0.1 10E3/UL
IMM GRANULOCYTES NFR BLD: 2 %
LDH SERPL L TO P-CCNC: 529 U/L (ref 0–250)
LYMPHOCYTES # BLD AUTO: 1.7 10E3/UL (ref 0.8–5.3)
LYMPHOCYTES NFR BLD AUTO: 27 %
MCH RBC QN AUTO: 30.7 PG (ref 26.5–33)
MCHC RBC AUTO-ENTMCNC: 31.3 G/DL (ref 31.5–36.5)
MCV RBC AUTO: 98 FL (ref 78–100)
MONOCYTES # BLD AUTO: 0.6 10E3/UL (ref 0–1.3)
MONOCYTES NFR BLD AUTO: 10 %
NEUTROPHILS # BLD AUTO: 3.8 10E3/UL (ref 1.6–8.3)
NEUTROPHILS NFR BLD AUTO: 59 %
NRBC # BLD AUTO: 0.2 10E3/UL
NRBC BLD AUTO-RTO: 4 /100
PLATELET # BLD AUTO: 598 10E3/UL (ref 150–450)
POTASSIUM SERPL-SCNC: 4.1 MMOL/L (ref 3.4–5.3)
PROT SERPL-MCNC: 6.9 G/DL (ref 6.4–8.3)
RBC # BLD AUTO: 3.42 10E6/UL (ref 3.8–5.2)
SODIUM SERPL-SCNC: 139 MMOL/L (ref 136–145)
WBC # BLD AUTO: 6.4 10E3/UL (ref 4–11)

## 2022-09-15 PROCEDURE — 80053 COMPREHEN METABOLIC PANEL: CPT | Performed by: INTERNAL MEDICINE

## 2022-09-15 PROCEDURE — G0463 HOSPITAL OUTPT CLINIC VISIT: HCPCS

## 2022-09-15 PROCEDURE — 99214 OFFICE O/P EST MOD 30 MIN: CPT | Performed by: INTERNAL MEDICINE

## 2022-09-15 PROCEDURE — 85025 COMPLETE CBC W/AUTO DIFF WBC: CPT | Performed by: INTERNAL MEDICINE

## 2022-09-15 PROCEDURE — 36415 COLL VENOUS BLD VENIPUNCTURE: CPT | Performed by: INTERNAL MEDICINE

## 2022-09-15 PROCEDURE — 83615 LACTATE (LD) (LDH) ENZYME: CPT | Performed by: INTERNAL MEDICINE

## 2022-09-15 PROCEDURE — 82040 ASSAY OF SERUM ALBUMIN: CPT | Performed by: INTERNAL MEDICINE

## 2022-09-15 RX ORDER — HYDROXYUREA 500 MG/1
500 CAPSULE ORAL DAILY
Qty: 90 CAPSULE | Refills: 3 | Status: SHIPPED | OUTPATIENT
Start: 2022-09-15 | End: 2023-03-16

## 2022-09-15 RX ORDER — OMEPRAZOLE 10 MG/1
20 CAPSULE, DELAYED RELEASE ORAL DAILY
Qty: 30 CAPSULE | Refills: 3 | Status: SHIPPED | OUTPATIENT
Start: 2022-09-15 | End: 2023-06-21

## 2022-09-15 ASSESSMENT — PAIN SCALES - GENERAL: PAINLEVEL: NO PAIN (0)

## 2022-09-15 NOTE — NURSING NOTE
Chief Complaint   Patient presents with     Blood Draw     Labs drawn by RN via , vitals taken.     Labs collected from venipuncture by RN. Vitals taken. Checked in for appointment(s).    Felicity Johnson RN

## 2022-09-15 NOTE — PROGRESS NOTES
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  CALR L367fs positive.  Low level JAK2 gene variant: p.V617F (1.6%)    Primary Physician: Tasha Nick     History Of Present Illness:  Ms. Rodríguez is a 62 year old female who presents with thrombocytosis, anemia, left shifted WBC and blasts on smear.    I initially saw her on 9/28/2021.  Please see previous note for details.  I have copied and updated from prior note.    Looking back she has had thrombocytosis and anemia for a number of years.  On 8/20/2021, peripheral blood smear showed left shifted WBC with blast-like cells in addition to anemia and thrombocytosis.      She feels pain in the pelvic area and back pain. Sometimes feels leg are swollen. Right ankle is more swollen. It is not more swollen. This is going on for 4 months. She denies B symptoms. No abnormal bleeding, erythromelalgia, dyspnea. Denies significant dyspnea. Feels fatigued. No nausea, vomiting, diarrhea or constipation. Last mammogram was few years ago.      I did further work-up including bone marrow biopsy.    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  CALR L367fs positive.  Low level JAK2 gene variant: p.V617F (1.6%)  She has had thrombocytosis, anemia, left shifted WBC and has borderline splenomegaly.  LDH is 463.      She started taking Hydrea 500 mg daily in mid December 2021.       Interval history.  She is here with her daughter.  She helps with interpretation.  Patient does not want a [facility]  today.  She is doing very well.  She denies any complaints and tolerating Hydrea 500 mg daily and aspirin daily well.  No nausea vomiting diarrhea constipation.  Energy is good.  Denies pain.  No infections.  No shortness of breath.    ROS:  Rest of the comprehensive review of the system was  unremarkable.    I reviewed other history in epic as below.      Past Medical/Surgical History:  Past Medical History:   Diagnosis Date     Diabetes (H)      Headache      Nonsenile cataract      Past Surgical History:   Procedure Laterality Date     ANKLE SURGERY      left     Allergies:  Allergies as of 09/15/2022     (No Known Allergies)     Current Medications:  Current Outpatient Medications   Medication Sig Dispense Refill     ACCU-CHEK GUIDE test strip        aspirin (ASA) 81 MG EC tablet Take 1 tablet (81 mg) by mouth daily 90 tablet 3     atorvastatin (LIPITOR) 80 MG tablet        blood glucose (ACCU-CHEK FASTCLIX) lancing device 1 kit by Other route       blood glucose (NO BRAND SPECIFIED) test strip USE AS DIRECTED TO TEST BLOOD SUGAR TWICE DAILY       Calcium Carb-Cholecalciferol (CALCIUM 600 + D PO) Take 1 tablet by mouth daily       capsaicin (ZOSTRIX) 0.025 % external cream capsaicin 0.025 % topical cream   BALDEMAR 1 APPLICATION AA TID PRN P       cholecalciferol (VITAMIN D3) 125 mcg (5000 units) capsule TAKE 1 CAPSULE BY MOUTH ONCE DAILY       cyclobenzaprine (FLEXERIL) 10 MG tablet cyclobenzaprine 10 mg tablet   TK 1 T PO TID PRF MUSCLE SPASM       DOCUSATE SODIUM PO Take 100 mg by mouth daily       hydroxyurea (HYDREA) 500 MG capsule Take 1 capsule (500 mg) by mouth daily 90 capsule 3     ibuprofen (ADVIL/MOTRIN) 600 MG tablet        methocarbamol (ROBAXIN) 500 MG tablet TAKE 1 TABLET BY MOUTH TWICE DAILY AS NEEDED       omeprazole (PRILOSEC) 10 MG DR capsule        polyethylene glycol (MIRALAX) 17 GM/Dose powder Take 17 g by mouth daily as needed for constipation 510 g 1     propranolol ER (INDERAL LA) 160 MG 24 hr capsule        QUETIAPINE FUMARATE PO Take 50 mg by mouth       SUMAtriptan (IMITREX) 50 MG tablet Take 50 mg by mouth       UNKNOWN TO PATIENT Pt states that she takes many medications, but is uncertain what they are for and what dosages they are.       venlafaxine (EFFEXOR-XR) 75 MG 24 hr  capsule venlafaxine ER 75 mg capsule,extended release 24 hr   TAKE ONE CAPSULE BY MOUTH ONCE DAILY        Family History:  Family History   Problem Relation Age of Onset     Glaucoma No family hx of      Macular Degeneration No family hx of      No family history of bleeding or clotting disorder or cancers.    Social History:  Social History     Socioeconomic History     Marital status: Single     Spouse name: Not on file     Number of children: Not on file     Years of education: Not on file     Highest education level: Not on file   Occupational History     Not on file   Tobacco Use     Smoking status: Never Smoker     Smokeless tobacco: Never Used   Substance and Sexual Activity     Alcohol use: No     Alcohol/week: 0.0 standard drinks     Drug use: No     Sexual activity: Not on file   Other Topics Concern     Not on file   Social History Narrative     Not on file     Social Determinants of Health     Financial Resource Strain: Not on file   Food Insecurity: Not on file   Transportation Needs: Not on file   Physical Activity: Not on file   Stress: Not on file   Social Connections: Not on file   Intimate Partner Violence: Not At Risk     Fear of Current or Ex-Partner: No     Emotionally Abused: No     Physically Abused: No     Sexually Abused: No   Housing Stability: Not on file     Physical Exam:  BP (!) 146/66   Pulse 80   Temp 98.2  F (36.8  C)   Resp 16   Wt 88.5 kg (195 lb 1.6 oz)   SpO2 100%   BMI 30.11 kg/m       Wt Readings from Last 4 Encounters:   09/15/22 88.5 kg (195 lb 1.6 oz)   05/12/22 85.5 kg (188 lb 9.6 oz)   02/10/22 87.1 kg (192 lb 1.6 oz)   12/29/21 85.6 kg (188 lb 12.8 oz)       CONSTITUTIONAL: no acute distress  EYES: PERRLA, no palor or icterus.   ENT/MOUTH: no mouth lesions. Ears normal  CVS: s1s2 no m r g .   RESPIRATORY: clear to auscultation b/l  GI: soft non tender no hepatosplenomegaly  NEURO: AAOX3  Grossly non focal neuro exam  INTEGUMENT: no obvious rashes  LYMPHATIC: no  palpable cervical, supraclavicular, axillary or inguinal LAD  MUSCULOSKELETAL: Unremarkable. No bony tenderness.   EXTREMITIES: no edema  PSYCH: Mentation, mood and affect are normal. Decision making capacity is intact          Laboratory/Imaging Studies      Reviewed    9/15/2022.    CBC showed WBC 6.4.  Hemoglobin 10.5.  Platelets 598.   Chemistries is unremarkable.  LDH is pending.    In July 2022, LDH was 465 and chemistry was unremarkable.          2/9/2022  LDH was 451.  Iron studies were unremarkable.  B12 was normal.  Erythropoietin 82.1.    Ultrasound of the abdomen on 2/9/2022 shows mildly enlarged spleen at 14.3 cm.    11/12/2021   bone marrow biopsy shows myeloproliferative neoplasm.  There is normocellular marrow with trilineage hematopoiesis and increased and atypical megakaryocytes and 1% blasts.  Mild to moderate increase in marrow reticulin fibrosis (MF 1-2 of 3) with features suggestive of osteosclerosis.  This is most likely essential thrombocytosis with early myelofibrosis.  A distinction between primary myelofibrosis and eating with early myelofibrosis is difficult.    Concurrent flow cytometry ([id number]) shows polytypic B cells, a small unusual T cell population similar to that detected in a recent peripheral blood flow cytometry study, no increase in myeloid blasts, and no abnormal myeloid blast population. There is no definitive morphologic or immunohistochemical correlate to the unusual T cells detected by flow cytometry, which may be present in the marrow or may reflect peripheral blood contamination.     Cytogenetics is 46XX.      Flow cytometry showed rare circulating CD34 positive myeloid blasts (1.3%), unusual CD8-positive CD57-positive T cells (1.6%) was present, and the B cells were polytypic.  The findings are not defintivie for a T cell lymphoproliferative disorder or a high grade myeloid neoplasm.    NGS panel shows CALR L367fs.  Low level JAK2 gene variant: p.V617F (1.6%) was  again detected ( Off note in 2006, JAK2 mutation was detected by PCR. )        CT chest abdomen and pelvis on 10/22/2021 does not show any acute findings.  It showed borderline splenomegaly and nonspecific heterogenous sclerotic  appearance of the vertebrae and pelvic bones.    Bilateral lower extremity ultrasound on 10/22/2021 does not show evidence of DVT.          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  CALR L367fs positive.  Low level JAK2 gene variant: p.V617F (1.6%)  She has had thrombocytosis, anemia, left shifted WBC and has borderline splenomegaly.  LDH is 463.    She has been on Hydrea 500 mg since mid December 2021.  She is also taking aspirin.     She is doing very well and currently denies any complaints.  Labs are stable.  Hemoglobin is a little better.  Platelets are is stable.  Continue the same management.  She will take Hydrea 500 mg daily and aspirin daily.  We will check labs every 2 months.    Previously she was also evaluated by  from bone marrow transplant department whom she saw on 12/29/2021.  He also agrees with current management of aspirin and hydroxyurea.      We did not address the following today    Pelvic/back pain and extreme fatigue- overall better. CT CAP was essentially unremarkable except borderline splenomegaly     Leg swelling- R>L. US did not show any DVT- swelling has resolved.       Labs every 2 months and see me back in 6 months.    All questions answered.  She is agreeable and comfortable with the plan.  Eddie Duong MD

## 2022-09-15 NOTE — LETTER
Date:September 17, 2022      Provider requested that no letter be sent. Do not send.       [facility]

## 2022-09-15 NOTE — NURSING NOTE
"Oncology Rooming Note    September 15, 2022 9:52 AM   Nancy Rodríguez is a 62 year old female who presents for:    Chief Complaint   Patient presents with     Blood Draw     Labs drawn by RN via , vitals taken.     Oncology Clinic Visit     MPN     Initial Vitals: BP (!) 146/66   Pulse 80   Temp 98.2  F (36.8  C)   Resp 16   Wt 88.5 kg (195 lb 1.6 oz)   SpO2 100%   BMI 30.11 kg/m   Estimated body mass index is 30.11 kg/m  as calculated from the following:    Height as of 7/12/17: 1.715 m (5' 7.5\").    Weight as of this encounter: 88.5 kg (195 lb 1.6 oz). Body surface area is 2.05 meters squared.  No Pain (0) Comment: Data Unavailable   No LMP recorded. Patient is postmenopausal.  Allergies reviewed: Yes  Medications reviewed: Yes    Medications: Pt is requesting refills of ASA 81 mg and Hydroxurea  Pharmacy name entered into [facility]: [facility] #[id number] - [facility] [address] [facility] [address]    Clinical concerns: Pt presents today for f/u with Dr Ad Bello, ROSSY  9/15/2022              "

## 2022-09-16 ENCOUNTER — PATIENT OUTREACH (OUTPATIENT)
Dept: ONCOLOGY | Facility: CLINIC | Age: 62
End: 2022-09-16

## 2022-09-16 NOTE — PROGRESS NOTES
[facility]: Cancer Care Short Note                                                                                          Completed chart audit to assign Oncology Care Coordination enrollment status.    Silvia Cardozo, RN, BSN  RN Care Coordinator   [facility]

## 2022-11-18 ENCOUNTER — LAB (OUTPATIENT)
Dept: LAB | Facility: CLINIC | Age: 62
End: 2022-11-18
Attending: INTERNAL MEDICINE
Payer: COMMERCIAL

## 2022-11-18 DIAGNOSIS — D47.1 MPN (MYELOPROLIFERATIVE NEOPLASM) (H): ICD-10-CM

## 2022-11-18 LAB
ALBUMIN SERPL BCG-MCNC: 4.2 G/DL (ref 3.5–5.2)
ALP SERPL-CCNC: 74 U/L (ref 35–104)
ALT SERPL W P-5'-P-CCNC: 16 U/L (ref 10–35)
ANION GAP SERPL CALCULATED.3IONS-SCNC: 9 MMOL/L (ref 7–15)
AST SERPL W P-5'-P-CCNC: 18 U/L (ref 10–35)
BASOPHILS # BLD AUTO: 0.1 10E3/UL (ref 0–0.2)
BASOPHILS NFR BLD AUTO: 1 %
BILIRUB SERPL-MCNC: 0.2 MG/DL
BUN SERPL-MCNC: 8.3 MG/DL (ref 8–23)
CALCIUM SERPL-MCNC: 9.4 MG/DL (ref 8.8–10.2)
CHLORIDE SERPL-SCNC: 104 MMOL/L (ref 98–107)
CREAT SERPL-MCNC: 0.46 MG/DL (ref 0.51–0.95)
DEPRECATED HCO3 PLAS-SCNC: 27 MMOL/L (ref 22–29)
EOSINOPHIL # BLD AUTO: 0.1 10E3/UL (ref 0–0.7)
EOSINOPHIL NFR BLD AUTO: 1 %
ERYTHROCYTE [DISTWIDTH] IN BLOOD BY AUTOMATED COUNT: 17.9 % (ref 10–15)
GFR SERPL CREATININE-BSD FRML MDRD: >90 ML/MIN/1.73M2
GLUCOSE SERPL-MCNC: 105 MG/DL (ref 70–99)
HCT VFR BLD AUTO: 33 % (ref 35–47)
HGB BLD-MCNC: 10.5 G/DL (ref 11.7–15.7)
IMM GRANULOCYTES # BLD: 0.3 10E3/UL
IMM GRANULOCYTES NFR BLD: 4 %
LDH SERPL L TO P-CCNC: 549 U/L (ref 0–250)
LYMPHOCYTES # BLD AUTO: 1.8 10E3/UL (ref 0.8–5.3)
LYMPHOCYTES NFR BLD AUTO: 26 %
MCH RBC QN AUTO: 31 PG (ref 26.5–33)
MCHC RBC AUTO-ENTMCNC: 31.8 G/DL (ref 31.5–36.5)
MCV RBC AUTO: 97 FL (ref 78–100)
MONOCYTES # BLD AUTO: 0.7 10E3/UL (ref 0–1.3)
MONOCYTES NFR BLD AUTO: 10 %
NEUTROPHILS # BLD AUTO: 4.1 10E3/UL (ref 1.6–8.3)
NEUTROPHILS NFR BLD AUTO: 58 %
NRBC # BLD AUTO: 0.2 10E3/UL
NRBC BLD AUTO-RTO: 2 /100
PLATELET # BLD AUTO: 649 10E3/UL (ref 150–450)
POTASSIUM SERPL-SCNC: 4.4 MMOL/L (ref 3.4–5.3)
PROT SERPL-MCNC: 7.1 G/DL (ref 6.4–8.3)
RBC # BLD AUTO: 3.39 10E6/UL (ref 3.8–5.2)
SODIUM SERPL-SCNC: 140 MMOL/L (ref 136–145)
WBC # BLD AUTO: 7 10E3/UL (ref 4–11)

## 2022-11-18 PROCEDURE — 80053 COMPREHEN METABOLIC PANEL: CPT

## 2022-11-18 PROCEDURE — 36415 COLL VENOUS BLD VENIPUNCTURE: CPT

## 2022-11-18 PROCEDURE — 85025 COMPLETE CBC W/AUTO DIFF WBC: CPT

## 2022-11-18 PROCEDURE — 83615 LACTATE (LD) (LDH) ENZYME: CPT

## 2022-11-18 NOTE — NURSING NOTE
Chief Complaint   Patient presents with     Blood Draw     Vpt blood draw from left arm by lab RN     Ct Olivares, RN

## 2022-11-22 ENCOUNTER — APPOINTMENT (OUTPATIENT)
Dept: GENERAL RADIOLOGY | Facility: CLINIC | Age: 62
End: 2022-11-22
Attending: EMERGENCY MEDICINE
Payer: COMMERCIAL

## 2022-11-22 ENCOUNTER — HOSPITAL ENCOUNTER (EMERGENCY)
Facility: CLINIC | Age: 62
Discharge: HOME | End: 2022-11-22
Attending: EMERGENCY MEDICINE | Admitting: EMERGENCY MEDICINE
Payer: COMMERCIAL

## 2022-11-22 VITALS
TEMPERATURE: 98.7 F | RESPIRATION RATE: 16 BRPM | WEIGHT: 195.3 LBS | SYSTOLIC BLOOD PRESSURE: 132 MMHG | HEART RATE: 74 BPM | DIASTOLIC BLOOD PRESSURE: 78 MMHG | OXYGEN SATURATION: 99 % | BODY MASS INDEX: 28.93 KG/M2 | HEIGHT: 69 IN

## 2022-11-22 DIAGNOSIS — M25.562 ACUTE PAIN OF LEFT KNEE: ICD-10-CM

## 2022-11-22 PROCEDURE — 99284 EMERGENCY DEPT VISIT MOD MDM: CPT | Mod: 25 | Performed by: EMERGENCY MEDICINE

## 2022-11-22 PROCEDURE — 250N000013 HC RX MED GY IP 250 OP 250 PS 637: Performed by: EMERGENCY MEDICINE

## 2022-11-22 PROCEDURE — 73562 X-RAY EXAM OF KNEE 3: CPT | Mod: LT

## 2022-11-22 PROCEDURE — 29505 APPLICATION LONG LEG SPLINT: CPT | Performed by: EMERGENCY MEDICINE

## 2022-11-22 PROCEDURE — 99284 EMERGENCY DEPT VISIT MOD MDM: CPT | Performed by: EMERGENCY MEDICINE

## 2022-11-22 RX ORDER — ACETAMINOPHEN 325 MG/1
650 TABLET ORAL EVERY 4 HOURS PRN
Status: DISCONTINUED | OUTPATIENT
Start: 2022-11-22 | End: 2022-11-22 | Stop reason: HOSPADM

## 2022-11-22 RX ADMIN — ACETAMINOPHEN 650 MG: 325 TABLET, FILM COATED ORAL at 14:52

## 2022-11-22 ASSESSMENT — ACTIVITIES OF DAILY LIVING (ADL): ADLS_ACUITY_SCORE: 35

## 2022-11-22 NOTE — DISCHARGE INSTRUCTIONS
Please take ibuprofen 800 mg every 4-6 hours and Tylenol 650 mg every 4-6 hours as needed for your pain.  You can take them together or separate if you want.

## 2022-11-22 NOTE — ED NOTES
Knee Immobilizer placed on left leg and crutches fitted to pt.  Pt educated on safety use of crutches, Pt states she has had crutches in the past and is comfortable walking.  CMS intact.   1 = assistive equipment

## 2022-11-23 ASSESSMENT — ENCOUNTER SYMPTOMS
BACK PAIN: 0
HEADACHES: 0
VOMITING: 0
FEVER: 0
DIARRHEA: 0
EYE REDNESS: 0
NAUSEA: 0
ABDOMINAL PAIN: 0
SHORTNESS OF BREATH: 0
SEIZURES: 0
COUGH: 0
ARTHRALGIAS: 1
DIFFICULTY URINATING: 0
CONFUSION: 0

## 2022-11-23 NOTE — ED PROVIDER NOTES
ED Provider Note  [facility]      History     Chief Complaint   Patient presents with     Knee Pain     Pt states she was getting in the car yesterday and twisted her left knee.     FERNANDA Rodríguez is a 62 year old female who presents emergency department with greater than 24 hours of left-sided knee pain, inability to bear weight after suffering an injury while bending down to get into a car and twisting and feeling a pop.  This occurred yesterday morning and since that time she has been taking ibuprofen.  She believes she is taking ibuprofen 800 mg tablets, 2 tablets a total of 3 times in the last 24 hours.  She has not had significant improvement with this pain medication.  No prior injury to that knee.  No other fall or pain elsewhere according to her report.  In further close questioning she is reporting pain throughout her left lateral leg    Past Medical History  Past Medical History:   Diagnosis Date     Cancer (H)      Diabetes (H)      Headache      Nonsenile cataract      Past Surgical History:   Procedure Laterality Date     ANKLE SURGERY      left     ACCU-CHEK GUIDE test strip  aspirin (ASA) 81 MG EC tablet  atorvastatin (LIPITOR) 80 MG tablet  blood glucose (ACCU-CHEK FASTCLIX) lancing device  blood glucose (NO BRAND SPECIFIED) test strip  Calcium Carb-Cholecalciferol (CALCIUM 600 + D PO)  capsaicin (ZOSTRIX) 0.025 % external cream  cholecalciferol (VITAMIN D3) 125 mcg (5000 units) capsule  cyclobenzaprine (FLEXERIL) 10 MG tablet  DOCUSATE SODIUM PO  hydroxyurea (HYDREA) 500 MG capsule  ibuprofen (ADVIL/MOTRIN) 600 MG tablet  methocarbamol (ROBAXIN) 500 MG tablet  omeprazole (PRILOSEC) 10 MG DR capsule  polyethylene glycol (MIRALAX) 17 GM/Dose powder  propranolol ER (INDERAL LA) 160 MG 24 hr capsule  QUETIAPINE FUMARATE PO  SUMAtriptan (IMITREX) 50 MG tablet  UNKNOWN TO PATIENT  venlafaxine (EFFEXOR-XR) 75 MG 24 hr capsule      No Known Allergies  Family History  Family  "History   Problem Relation Age of Onset     Glaucoma No family hx of      Macular Degeneration No family hx of      Social History   Social History     Tobacco Use     Smoking status: Never     Smokeless tobacco: Never   Substance Use Topics     Alcohol use: No     Alcohol/week: 0.0 standard drinks     Drug use: No      Past medical history, past surgical history, medications, allergies, family history, and social history were reviewed with the patient. No additional pertinent items.       Review of Systems   Constitutional: Negative for fever.   HENT: Negative for congestion.    Eyes: Negative for redness.   Respiratory: Negative for cough and shortness of breath.    Cardiovascular: Negative for chest pain.   Gastrointestinal: Negative for abdominal pain, diarrhea, nausea and vomiting.   Genitourinary: Negative for difficulty urinating.   Musculoskeletal: Positive for arthralgias. Negative for back pain.   Skin: Negative for rash.   Neurological: Negative for seizures and headaches.   Psychiatric/Behavioral: Negative for confusion.     A complete review of systems was performed with pertinent positives and negatives noted in the HPI, and all other systems negative.    Physical Exam   BP: 122/77  Pulse: 74  Temp: 98.7  F (37.1  C)  Resp: 16  Height: 175.3 cm (5' 9\")  Weight: 88.6 kg (195 lb 4.8 oz)  SpO2: 100 %  Physical Exam  Constitutional:       General: She is awake. She is in acute distress.      Appearance: Normal appearance. She is well-developed. She is not ill-appearing or toxic-appearing.   HENT:      Head: Atraumatic.      Mouth/Throat:      Pharynx: No oropharyngeal exudate.   Eyes:      General: No scleral icterus.     Pupils: Pupils are equal, round, and reactive to light.   Cardiovascular:      Rate and Rhythm: Normal rate and regular rhythm.   Pulmonary:      Effort: No respiratory distress.   Abdominal:      General: Abdomen is flat.   Musculoskeletal:      Right knee: Normal.      Left knee: No " effusion or bony tenderness. Decreased range of motion. Tenderness present. No patellar tendon tenderness. No LCL laxity, MCL laxity, ACL laxity or PCL laxity.     Comments: She has tenderness to palpation throughout the left joint with decreased range of motion that is particularly limited by pain.  She is reporting pain to the lateral aspect and is also unable to fire her quadriceps muscles for me.  She does have visible swelling/deformity over the distal medial thigh at the site of the quadriceps insertion.  She is tender to palpation there as well as along the lateral aspect of the knee.  Normal Lachman test, normal posterior drawer test.   Skin:     General: Skin is warm.      Findings: No rash.   Neurological:      Mental Status: She is alert and oriented to person, place, and time.   Psychiatric:         Attention and Perception: Attention normal.         Mood and Affect: Mood normal.         Speech: Speech normal.         Behavior: Behavior normal. Behavior is cooperative.         ED Course      Procedures                     Results for orders placed or performed during the hospital encounter of 11/22/22   XR Knee Left 3 Views     Status: None    Narrative    EXAM: KNEE LEFT THREE VIEWS  DATE/TIME: 11/22/2022 1:04 PM     INDICATION: Left-sided knee pain after an injury.   COMPARISON: None.      Impression    IMPRESSION:  1.  No fracture or joint malalignment.  2.  Mild left knee degenerative arthrosis. Mild patellofemoral  compartment narrowing. Mild medial and patellofemoral compartment  marginal osteophytosis.  3.  No significant joint effusion.       KELSI BLUM MD         SYSTEM ID:  [id number]     Medications - No data to display     Assessments & Plan (with Medical Decision Making)   Nancy Rodríguez is a 62 year old female who presents emergency department with greater than 24 hours of left-sided knee pain, inability to bear weight after suffering an injury while bending down to get into a car and  twisting and feeling a pop.  Based on history and exam I am concerned about a tendinous or ligamentous injury.  Her ACL and PCL appear intact.  Meniscal injury is certainly possible as well.  Screening x-ray done from triage and is unrevealing and reassuring.    I have reviewed the nursing notes. I have reviewed the findings, diagnosis, plan and need for follow up with the patient.    Patient is having pain with bearing weight, will place in a knee immobilizer and provide crutches along with orthopedics referral for what appears to be a soft tissue injury.  My exam unfortunately is somewhat limited by patient compliance and a language barrier.  She may have multiple injuries but I am particularly suspicious for tenderness and ligamentous injury.  Advised her to bear weight as tolerated, we will proceed with discharge and recommendation to take NSAIDs, recommend decreasing her ibuprofen dose to 800 mg as a single dose every 4-6 hours and to add in Tylenol.  Patient and her daughter voiced understanding and agreement with the plan.    Discharge Medication List as of 11/22/2022  3:04 PM          Final diagnoses:   Acute pain of left knee       --  Gordo Willis MD PhD  [facility] EMERGENCY DEPARTMENT  11/22/2022     Willy Willis MD  11/23/22 [id number]

## 2022-12-13 ENCOUNTER — LAB REQUISITION (OUTPATIENT)
Dept: LAB | Facility: CLINIC | Age: 62
End: 2022-12-13
Payer: COMMERCIAL

## 2022-12-13 DIAGNOSIS — E11.9 TYPE 2 DIABETES MELLITUS WITHOUT COMPLICATIONS (H): ICD-10-CM

## 2022-12-13 LAB
CREAT UR-MCNC: 77.4 MG/DL
MICROALBUMIN UR-MCNC: 13.4 MG/L
MICROALBUMIN/CREAT UR: 17.31 MG/G CR (ref 0–25)

## 2022-12-13 PROCEDURE — 82570 ASSAY OF URINE CREATININE: CPT | Mod: ORL | Performed by: FAMILY MEDICINE

## 2022-12-14 ENCOUNTER — LAB REQUISITION (OUTPATIENT)
Dept: LAB | Facility: CLINIC | Age: 62
End: 2022-12-14
Payer: COMMERCIAL

## 2022-12-14 DIAGNOSIS — Z01.419 ENCOUNTER FOR GYNECOLOGICAL EXAMINATION (GENERAL) (ROUTINE) WITHOUT ABNORMAL FINDINGS: ICD-10-CM

## 2022-12-14 LAB
CHOLEST SERPL-MCNC: 213 MG/DL
HDLC SERPL-MCNC: 42 MG/DL
LDLC SERPL CALC-MCNC: 133 MG/DL
NONHDLC SERPL-MCNC: 171 MG/DL
TRIGL SERPL-MCNC: 192 MG/DL

## 2022-12-14 PROCEDURE — 80061 LIPID PANEL: CPT | Mod: ORL

## 2022-12-26 ENCOUNTER — HEALTH MAINTENANCE LETTER (OUTPATIENT)
Age: 62
End: 2022-12-26

## 2023-01-18 ENCOUNTER — LAB REQUISITION (OUTPATIENT)
Dept: LAB | Facility: CLINIC | Age: 63
End: 2023-01-18
Payer: COMMERCIAL

## 2023-01-18 DIAGNOSIS — R14.0 ABDOMINAL DISTENSION (GASEOUS): ICD-10-CM

## 2023-01-18 DIAGNOSIS — Z12.11 ENCOUNTER FOR SCREENING FOR MALIGNANT NEOPLASM OF COLON: ICD-10-CM

## 2023-01-18 LAB — HEMOCCULT STL QL IA: NEGATIVE

## 2023-01-18 PROCEDURE — 82274 ASSAY TEST FOR BLOOD FECAL: CPT | Mod: ORL | Performed by: FAMILY MEDICINE

## 2023-01-18 PROCEDURE — 87338 HPYLORI STOOL AG IA: CPT | Mod: ORL | Performed by: FAMILY MEDICINE

## 2023-01-19 LAB — H PYLORI AG STL QL IA: NEGATIVE

## 2023-01-20 ENCOUNTER — LAB (OUTPATIENT)
Dept: LAB | Facility: CLINIC | Age: 63
End: 2023-01-20
Payer: COMMERCIAL

## 2023-01-20 DIAGNOSIS — D47.1 MPN (MYELOPROLIFERATIVE NEOPLASM) (H): ICD-10-CM

## 2023-01-20 LAB
ALBUMIN SERPL BCG-MCNC: 4.4 G/DL (ref 3.5–5.2)
ALP SERPL-CCNC: 74 U/L (ref 35–104)
ALT SERPL W P-5'-P-CCNC: 14 U/L (ref 10–35)
ANION GAP SERPL CALCULATED.3IONS-SCNC: 11 MMOL/L (ref 7–15)
AST SERPL W P-5'-P-CCNC: 20 U/L (ref 10–35)
BASOPHILS # BLD AUTO: 0.1 10E3/UL (ref 0–0.2)
BASOPHILS NFR BLD AUTO: 1 %
BILIRUB SERPL-MCNC: 0.2 MG/DL
BUN SERPL-MCNC: 7.8 MG/DL (ref 8–23)
CALCIUM SERPL-MCNC: 9.6 MG/DL (ref 8.8–10.2)
CHLORIDE SERPL-SCNC: 105 MMOL/L (ref 98–107)
CREAT SERPL-MCNC: 0.46 MG/DL (ref 0.51–0.95)
DEPRECATED HCO3 PLAS-SCNC: 23 MMOL/L (ref 22–29)
EOSINOPHIL # BLD AUTO: 0.1 10E3/UL (ref 0–0.7)
EOSINOPHIL NFR BLD AUTO: 1 %
ERYTHROCYTE [DISTWIDTH] IN BLOOD BY AUTOMATED COUNT: 19.1 % (ref 10–15)
GFR SERPL CREATININE-BSD FRML MDRD: >90 ML/MIN/1.73M2
GLUCOSE SERPL-MCNC: 137 MG/DL (ref 70–99)
HCT VFR BLD AUTO: 33.5 % (ref 35–47)
HGB BLD-MCNC: 10.5 G/DL (ref 11.7–15.7)
IMM GRANULOCYTES # BLD: 0.2 10E3/UL
IMM GRANULOCYTES NFR BLD: 3 %
LDH SERPL L TO P-CCNC: 507 U/L (ref 0–250)
LYMPHOCYTES # BLD AUTO: 1.6 10E3/UL (ref 0.8–5.3)
LYMPHOCYTES NFR BLD AUTO: 26 %
MCH RBC QN AUTO: 30.1 PG (ref 26.5–33)
MCHC RBC AUTO-ENTMCNC: 31.3 G/DL (ref 31.5–36.5)
MCV RBC AUTO: 96 FL (ref 78–100)
MONOCYTES # BLD AUTO: 0.5 10E3/UL (ref 0–1.3)
MONOCYTES NFR BLD AUTO: 8 %
NEUTROPHILS # BLD AUTO: 3.9 10E3/UL (ref 1.6–8.3)
NEUTROPHILS NFR BLD AUTO: 61 %
NRBC # BLD AUTO: 0.3 10E3/UL
NRBC BLD AUTO-RTO: 4 /100
PLATELET # BLD AUTO: 672 10E3/UL (ref 150–450)
POTASSIUM SERPL-SCNC: 4.5 MMOL/L (ref 3.4–5.3)
PROT SERPL-MCNC: 7.3 G/DL (ref 6.4–8.3)
RBC # BLD AUTO: 3.49 10E6/UL (ref 3.8–5.2)
SODIUM SERPL-SCNC: 139 MMOL/L (ref 136–145)
WBC # BLD AUTO: 6.3 10E3/UL (ref 4–11)

## 2023-01-20 PROCEDURE — 85025 COMPLETE CBC W/AUTO DIFF WBC: CPT

## 2023-01-20 PROCEDURE — 80053 COMPREHEN METABOLIC PANEL: CPT

## 2023-01-20 PROCEDURE — 36415 COLL VENOUS BLD VENIPUNCTURE: CPT

## 2023-01-20 PROCEDURE — 83615 LACTATE (LD) (LDH) ENZYME: CPT

## 2023-01-20 NOTE — NURSING NOTE
Chief Complaint   Patient presents with     Lab Only     Labs drawn with  by rn.       Labs drawn with  by rn.  Pt tolerated well.     Rosalba Ramos RN

## 2023-03-16 ENCOUNTER — LAB (OUTPATIENT)
Dept: LAB | Facility: CLINIC | Age: 63
End: 2023-03-16
Payer: COMMERCIAL

## 2023-03-16 ENCOUNTER — ONCOLOGY VISIT (OUTPATIENT)
Dept: ONCOLOGY | Facility: CLINIC | Age: 63
End: 2023-03-16
Attending: INTERNAL MEDICINE
Payer: COMMERCIAL

## 2023-03-16 VITALS
RESPIRATION RATE: 16 BRPM | SYSTOLIC BLOOD PRESSURE: 160 MMHG | WEIGHT: 199.2 LBS | HEART RATE: 96 BPM | DIASTOLIC BLOOD PRESSURE: 61 MMHG | OXYGEN SATURATION: 96 % | TEMPERATURE: 99 F | BODY MASS INDEX: 29.42 KG/M2

## 2023-03-16 DIAGNOSIS — D47.1 MPN (MYELOPROLIFERATIVE NEOPLASM) (H): ICD-10-CM

## 2023-03-16 DIAGNOSIS — D47.1 CHRONIC MYELOSIS (H): Primary | ICD-10-CM

## 2023-03-16 DIAGNOSIS — D64.9 ANEMIA, UNSPECIFIED TYPE: Primary | ICD-10-CM

## 2023-03-16 LAB
ALBUMIN SERPL BCG-MCNC: 4.1 G/DL (ref 3.5–5.2)
ALP SERPL-CCNC: 82 U/L (ref 35–104)
ALT SERPL W P-5'-P-CCNC: 35 U/L (ref 10–35)
ANION GAP SERPL CALCULATED.3IONS-SCNC: 11 MMOL/L (ref 7–15)
AST SERPL W P-5'-P-CCNC: 34 U/L (ref 10–35)
BASOPHILS # BLD AUTO: 0.1 10E3/UL (ref 0–0.2)
BASOPHILS NFR BLD AUTO: 1 %
BILIRUB SERPL-MCNC: 0.2 MG/DL
BUN SERPL-MCNC: 9.1 MG/DL (ref 8–23)
CALCIUM SERPL-MCNC: 9.2 MG/DL (ref 8.8–10.2)
CHLORIDE SERPL-SCNC: 103 MMOL/L (ref 98–107)
CREAT SERPL-MCNC: 0.51 MG/DL (ref 0.51–0.95)
DEPRECATED HCO3 PLAS-SCNC: 26 MMOL/L (ref 22–29)
EOSINOPHIL # BLD AUTO: 0.1 10E3/UL (ref 0–0.7)
EOSINOPHIL NFR BLD AUTO: 1 %
ERYTHROCYTE [DISTWIDTH] IN BLOOD BY AUTOMATED COUNT: 19.9 % (ref 10–15)
FERRITIN SERPL-MCNC: 21 NG/ML (ref 11–328)
GFR SERPL CREATININE-BSD FRML MDRD: >90 ML/MIN/1.73M2
GLUCOSE SERPL-MCNC: 163 MG/DL (ref 70–99)
HCT VFR BLD AUTO: 28.2 % (ref 35–47)
HGB BLD-MCNC: 8.7 G/DL (ref 11.7–15.7)
IMM GRANULOCYTES # BLD: 0.2 10E3/UL
IMM GRANULOCYTES NFR BLD: 2 %
IRON BINDING CAPACITY (ROCHE): 318 UG/DL (ref 240–430)
IRON SATN MFR SERPL: 15 % (ref 15–46)
IRON SERPL-MCNC: 47 UG/DL (ref 37–145)
LDH SERPL L TO P-CCNC: 474 U/L (ref 0–250)
LYMPHOCYTES # BLD AUTO: 1.7 10E3/UL (ref 0.8–5.3)
LYMPHOCYTES NFR BLD AUTO: 23 %
MCH RBC QN AUTO: 30.3 PG (ref 26.5–33)
MCHC RBC AUTO-ENTMCNC: 30.9 G/DL (ref 31.5–36.5)
MCV RBC AUTO: 98 FL (ref 78–100)
MONOCYTES # BLD AUTO: 0.7 10E3/UL (ref 0–1.3)
MONOCYTES NFR BLD AUTO: 10 %
NEUTROPHILS # BLD AUTO: 4.5 10E3/UL (ref 1.6–8.3)
NEUTROPHILS NFR BLD AUTO: 63 %
NRBC # BLD AUTO: 0.2 10E3/UL
NRBC BLD AUTO-RTO: 3 /100
PLATELET # BLD AUTO: 660 10E3/UL (ref 150–450)
POTASSIUM SERPL-SCNC: 3.9 MMOL/L (ref 3.4–5.3)
PROT SERPL-MCNC: 7 G/DL (ref 6.4–8.3)
RBC # BLD AUTO: 2.87 10E6/UL (ref 3.8–5.2)
SODIUM SERPL-SCNC: 140 MMOL/L (ref 136–145)
VIT B12 SERPL-MCNC: 630 PG/ML (ref 232–1245)
WBC # BLD AUTO: 7.3 10E3/UL (ref 4–11)

## 2023-03-16 PROCEDURE — 99214 OFFICE O/P EST MOD 30 MIN: CPT | Performed by: INTERNAL MEDICINE

## 2023-03-16 PROCEDURE — 82607 VITAMIN B-12: CPT

## 2023-03-16 PROCEDURE — G0463 HOSPITAL OUTPT CLINIC VISIT: HCPCS | Performed by: INTERNAL MEDICINE

## 2023-03-16 PROCEDURE — 82728 ASSAY OF FERRITIN: CPT

## 2023-03-16 PROCEDURE — 83550 IRON BINDING TEST: CPT | Performed by: INTERNAL MEDICINE

## 2023-03-16 PROCEDURE — 80053 COMPREHEN METABOLIC PANEL: CPT | Performed by: INTERNAL MEDICINE

## 2023-03-16 PROCEDURE — 85025 COMPLETE CBC W/AUTO DIFF WBC: CPT | Performed by: INTERNAL MEDICINE

## 2023-03-16 PROCEDURE — 82668 ASSAY OF ERYTHROPOIETIN: CPT | Performed by: INTERNAL MEDICINE

## 2023-03-16 PROCEDURE — 83615 LACTATE (LD) (LDH) ENZYME: CPT | Performed by: INTERNAL MEDICINE

## 2023-03-16 PROCEDURE — 36415 COLL VENOUS BLD VENIPUNCTURE: CPT | Performed by: INTERNAL MEDICINE

## 2023-03-16 RX ORDER — ATORVASTATIN CALCIUM 80 MG/1
1 TABLET, FILM COATED ORAL DAILY
COMMUNITY
Start: 2022-12-21

## 2023-03-16 RX ORDER — CETIRIZINE HYDROCHLORIDE 10 MG/1
1 TABLET ORAL
COMMUNITY
Start: 2023-03-10

## 2023-03-16 RX ORDER — FLUTICASONE PROPIONATE 50 MCG
SPRAY, SUSPENSION (ML) NASAL
COMMUNITY
Start: 2023-03-10

## 2023-03-16 RX ORDER — AMMONIUM LACTATE 12 G/100G
LOTION TOPICAL
COMMUNITY
Start: 2023-03-10

## 2023-03-16 RX ORDER — ERYTHROMYCIN 5 MG/G
OINTMENT OPHTHALMIC
COMMUNITY
Start: 2022-10-07

## 2023-03-16 RX ORDER — HYDROXYUREA 500 MG/1
500 CAPSULE ORAL DAILY
Qty: 90 CAPSULE | Refills: 3 | Status: SHIPPED | OUTPATIENT
Start: 2023-03-16 | End: 2023-12-14

## 2023-03-16 RX ORDER — HYDROCODONE BITARTRATE AND ACETAMINOPHEN 5; 325 MG/1; MG/1
1 TABLET ORAL EVERY 6 HOURS PRN
COMMUNITY
Start: 2022-10-07

## 2023-03-16 RX ORDER — LANCETS
EACH MISCELLANEOUS
COMMUNITY
Start: 2023-03-12

## 2023-03-16 ASSESSMENT — PAIN SCALES - GENERAL: PAINLEVEL: NO PAIN (0)

## 2023-03-16 NOTE — LETTER
3/16/2023         RE: Nancy Rodríguez  [address]  [address]  [place] [address]        Dear Colleague,    Thank you for referring your patient, Nancy Rodríguez, to the [facility]. Please see a copy of my visit note below.    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  CALR L367fs positive.  Low level JAK2 gene variant: p.V617F (1.6%)    Primary Physician: Tasha Nick     History Of Present Illness:  Ms. Rodríguez is a 63 year old female who presents with thrombocytosis, anemia, left shifted WBC and blasts on smear.    I initially saw her on 9/28/2021.  Please see previous note for details.  I have copied and updated from prior note.    Looking back she has had thrombocytosis and anemia for a number of years.  On 8/20/2021, peripheral blood smear showed left shifted WBC with blast-like cells in addition to anemia and thrombocytosis.      She feels pain in the pelvic area and back pain. Sometimes feels leg are swollen. Right ankle is more swollen. It is not more swollen. This is going on for 4 months. She denies B symptoms. No abnormal bleeding, erythromelalgia, dyspnea. Denies significant dyspnea. Feels fatigued. No nausea, vomiting, diarrhea or constipation. Last mammogram was few years ago.      I did further work-up including bone marrow biopsy.    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  CALR L367fs positive.  Low level JAK2 gene variant: p.V617F (1.6%)  She has had thrombocytosis, anemia, left shifted WBC and has borderline splenomegaly.  LDH is 463.      She started taking Hydrea 500 mg daily in mid December 2021.       Interval history.  She is here with her daughter.  She helps with interpretation.  Patient does not want a [place]  today.  She continues to feel well and  has decent energy.  She has not noticed any change in energy level.  She continues to tolerate Hydrea 500 mg daily and daily baby aspirin well.  Denies any bleeding.  No B symptoms.  No erythromelalgia.  No infections.  Denies shortness of breath.  No new swellings.  Denies nausea or vomiting.  She has some constipation for which she uses MiraLAX.    ROS:  Rest of the comprehensive review of the system was unremarkable.    I reviewed other history in epic as below.      Past Medical/Surgical History:  Past Medical History:   Diagnosis Date     Cancer (H)      Diabetes (H)      Headache      Nonsenile cataract      Past Surgical History:   Procedure Laterality Date     ANKLE SURGERY      left     Allergies:  Allergies as of 03/16/2023     (No Known Allergies)     Current Medications:  Current Outpatient Medications   Medication Sig Dispense Refill     ammonium lactate (LAC-HYDRIN) 12 % external lotion APPLY TOPICALLY TO THE AFFECTED AREA TWICE DAILY AS NEEDED       aspirin (ASA) 81 MG EC tablet Take 1 tablet (81 mg) by mouth daily 90 tablet 3     atorvastatin (LIPITOR) 80 MG tablet Take 1 tablet by mouth daily       Calcium Carb-Cholecalciferol (CALCIUM 600 + D PO) Take 1 tablet by mouth daily       capsaicin (ZOSTRIX) 0.025 % external cream capsaicin 0.025 % topical cream   BALDEMAR 1 APPLICATION AA TID PRN P       cetirizine (ZYRTEC) 10 MG tablet Take 1 tablet by mouth daily at 2 pm       cholecalciferol (VITAMIN D3) 125 mcg (5000 units) capsule TAKE 1 CAPSULE BY MOUTH ONCE DAILY       cyclobenzaprine (FLEXERIL) 10 MG tablet cyclobenzaprine 10 mg tablet   TK 1 T PO TID PRF MUSCLE SPASM       DOCUSATE SODIUM PO Take 100 mg by mouth daily       erythromycin (ROMYCIN) 5 MG/GM ophthalmic ointment APPLY OINTMENT TO INCISION LINES THREE TIMES DAILY FOR 7 DAYS THEN EVERY AT BEDTIME FOR 7 DAYS       fluticasone (FLONASE) 50 MCG/ACT nasal spray SHAKE LIQUID AND USE 1 SPRAY IN EACH NOSTRIL EVERY DAY AS NEEDED        HYDROcodone-acetaminophen (NORCO) 5-325 MG tablet Take 1 tablet by mouth every 6 hours as needed       hydroxyurea (HYDREA) 500 MG capsule Take 1 capsule (500 mg) by mouth daily 90 capsule 3     ibuprofen (ADVIL/MOTRIN) 600 MG tablet        methocarbamol (ROBAXIN) 500 MG tablet TAKE 1 TABLET BY MOUTH TWICE DAILY AS NEEDED       omeprazole (PRILOSEC) 10 MG DR capsule Take 2 capsules (20 mg) by mouth daily 30 capsule 3     polyethylene glycol (MIRALAX) 17 GM/Dose powder Take 17 g by mouth daily as needed for constipation 510 g 1     propranolol ER (INDERAL LA) 160 MG 24 hr capsule        QUETIAPINE FUMARATE PO Take 50 mg by mouth       SUMAtriptan (IMITREX) 50 MG tablet Take 50 mg by mouth       venlafaxine (EFFEXOR-XR) 75 MG 24 hr capsule venlafaxine ER 75 mg capsule,extended release 24 hr   TAKE ONE CAPSULE BY MOUTH ONCE DAILY       ACCU-CHEK GUIDE test strip  (Patient not taking: Reported on 3/16/2023)       blood glucose (ACCU-CHEK FASTCLIX) lancing device 1 kit by Other route (Patient not taking: Reported on 3/16/2023)       blood glucose (NO BRAND SPECIFIED) test strip USE AS DIRECTED TO TEST BLOOD SUGAR TWICE DAILY (Patient not taking: Reported on 3/16/2023)       blood glucose monitoring (ACCU-CHEK FASTCLIX) lancets  (Patient not taking: Reported on 3/16/2023)        Family History:  Family History   Problem Relation Age of Onset     Glaucoma No family hx of      Macular Degeneration No family hx of      No family history of bleeding or clotting disorder or cancers.    Social History:  Social History     Socioeconomic History     Marital status: Single     Spouse name: Not on file     Number of children: Not on file     Years of education: Not on file     Highest education level: Not on file   Occupational History     Not on file   Tobacco Use     Smoking status: Never     Smokeless tobacco: Never   Substance and Sexual Activity     Alcohol use: No     Alcohol/week: 0.0 standard drinks     Drug use: No      Sexual activity: Not Currently     Partners: Male   Other Topics Concern     Not on file   Social History Narrative     Not on file     Social Determinants of Health     Financial Resource Strain: Not on file   Food Insecurity: Not on file   Transportation Needs: Not on file   Physical Activity: Not on file   Stress: Not on file   Social Connections: Not on file   Intimate Partner Violence: Not At Risk     Fear of Current or Ex-Partner: No     Emotionally Abused: No     Physically Abused: No     Sexually Abused: No   Housing Stability: Not on file     Physical Exam:  BP (!) 160/61 (BP Location: Right arm, Patient Position: Sitting, Cuff Size: Adult Regular)   Pulse 96   Temp 99  F (37.2  C) (Tympanic)   Resp 16   Wt 90.4 kg (199 lb 3.2 oz)   SpO2 96%   BMI 29.42 kg/m       Wt Readings from Last 4 Encounters:   03/16/23 90.4 kg (199 lb 3.2 oz)   11/22/22 88.6 kg (195 lb 4.8 oz)   09/15/22 88.5 kg (195 lb 1.6 oz)   05/12/22 85.5 kg (188 lb 9.6 oz)       CONSTITUTIONAL: No apparent distress  EYES: PERRLA, there is mild pallor but no jaundice.  ENT/MOUTH: Ears unremarkable. No oral lesions  CVS: s1s2 normal  RESPIRATORY: Chest is clear  GI: Abdomen is benign  NEURO: Alert and oriented ×3  INTEGUMENT: no concerning skin rashes   LYMPHATIC: no palpable lymphadenopathy  MUSCULOSKELETAL: Unremarkable. No bony tenderness.   EXTREMITIES: no pedal edema  PSYCH: Mentation, mood and affect are appropriate          Laboratory/Imaging Studies      Reviewed  3/16/2023  CBC shows WBC 7.3.  Hemoglobin 8.7.  Platelets 660.  Normal WBC differential.  CMP and LDH are pending.    9/15/2022.    CBC showed WBC 6.4.  Hemoglobin 10.5.  Platelets 598.   Chemistries is unremarkable.  LDH is pending.    In July 2022, LDH was 465 and chemistry was unremarkable.          2/9/2022  LDH was 451.  Iron studies were unremarkable.  B12 was normal.  Erythropoietin 82.1.    Ultrasound of the abdomen on 2/9/2022 shows mildly enlarged spleen at 14.3  cm.    11/12/2021   bone marrow biopsy shows myeloproliferative neoplasm.  There is normocellular marrow with trilineage hematopoiesis and increased and atypical megakaryocytes and 1% blasts.  Mild to moderate increase in marrow reticulin fibrosis (MF 1-2 of 3) with features suggestive of osteosclerosis.  This is most likely essential thrombocytosis with early myelofibrosis.  A distinction between primary myelofibrosis and eating with early myelofibrosis is difficult.    Concurrent flow cytometry ([id number]) shows polytypic B cells, a small unusual T cell population similar to that detected in a recent peripheral blood flow cytometry study, no increase in myeloid blasts, and no abnormal myeloid blast population. There is no definitive morphologic or immunohistochemical correlate to the unusual T cells detected by flow cytometry, which may be present in the marrow or may reflect peripheral blood contamination.     Cytogenetics is 46XX.      Flow cytometry showed rare circulating CD34 positive myeloid blasts (1.3%), unusual CD8-positive CD57-positive T cells (1.6%) was present, and the B cells were polytypic.  The findings are not defintivie for a T cell lymphoproliferative disorder or a high grade myeloid neoplasm.    NGS panel shows CALR L367fs.  Low level JAK2 gene variant: p.V617F (1.6%) was again detected ( Off note in 2006, JAK2 mutation was detected by PCR. )        CT chest abdomen and pelvis on 10/22/2021 does not show any acute findings.  It showed borderline splenomegaly and nonspecific heterogenous sclerotic  appearance of the vertebrae and pelvic bones.    Bilateral lower extremity ultrasound on 10/22/2021 does not show evidence of DVT.          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  CALR L367fs positive.  Low level JAK2 gene variant: p.V617F (1.6%)  She has had thrombocytosis, anemia, left shifted WBC and has borderline  splenomegaly.  LDH is 463.    She has been on Hydrea 500 mg since mid December 2021.  She is also taking aspirin.     Overall feels well.  She is more anemic now although WBC and platelets are stable to slightly more elevated.  LDH is pending.    For now I would recommend continuing with same dose of Hydrea Hydrea 500 mg daily and daily baby aspirin.  Check labs more frequently every month.      For anemia, I will also add iron studies and other work-up to labs from today.      Previously she was also evaluated by  from bone marrow transplant department whom she saw on 12/29/2021.  He also agrees with current management of aspirin and hydroxyurea.    Elevated blood pressure.    Her blood pressure in the clinic was elevated.    She is asymptomatic.  I recommend that she checks her blood pressures at home regularly when she is relaxed and keep a log of it.  If it is persistently high then she should talk to her primary care physician for better blood pressure management.  I am not making changes to her medications today      We did not address the following today    Pelvic/back pain and extreme fatigue- overall better. CT CAP was essentially unremarkable except borderline splenomegaly     Leg swelling- R>L. US did not show any DVT- swelling has resolved.       Labs monthly and see me back in 3 months.    All questions answered.  She is agreeable and comfortable with the plan.  Eddie Duong MD      Again, thank you for allowing me to participate in the care of your patient.        Sincerely,        Eddie Duong MD

## 2023-03-16 NOTE — PROGRESS NOTES
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  CALR L367fs positive.  Low level JAK2 gene variant: p.V617F (1.6%)    Primary Physician: Tasha Nick     History Of Present Illness:  Ms. Rodríguez is a 63 year old female who presents with thrombocytosis, anemia, left shifted WBC and blasts on smear.    I initially saw her on 9/28/2021.  Please see previous note for details.  I have copied and updated from prior note.    Looking back she has had thrombocytosis and anemia for a number of years.  On 8/20/2021, peripheral blood smear showed left shifted WBC with blast-like cells in addition to anemia and thrombocytosis.      She feels pain in the pelvic area and back pain. Sometimes feels leg are swollen. Right ankle is more swollen. It is not more swollen. This is going on for 4 months. She denies B symptoms. No abnormal bleeding, erythromelalgia, dyspnea. Denies significant dyspnea. Feels fatigued. No nausea, vomiting, diarrhea or constipation. Last mammogram was few years ago.      I did further work-up including bone marrow biopsy.    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  CALR L367fs positive.  Low level JAK2 gene variant: p.V617F (1.6%)  She has had thrombocytosis, anemia, left shifted WBC and has borderline splenomegaly.  LDH is 463.      She started taking Hydrea 500 mg daily in mid December 2021.       Interval history.  She is here with her daughter.  She helps with interpretation.  Patient does not want a [place]  today.  She continues to feel well and has decent energy.  She has not noticed any change in energy level.  She continues to tolerate Hydrea 500 mg daily and daily baby aspirin well.  Denies any bleeding.  No B symptoms.  No erythromelalgia.  No infections.  Denies shortness of breath.  No new  swellings.  Denies nausea or vomiting.  She has some constipation for which she uses MiraLAX.    ROS:  Rest of the comprehensive review of the system was unremarkable.    I reviewed other history in epic as below.      Past Medical/Surgical History:  Past Medical History:   Diagnosis Date     Cancer (H)      Diabetes (H)      Headache      Nonsenile cataract      Past Surgical History:   Procedure Laterality Date     ANKLE SURGERY      left     Allergies:  Allergies as of 03/16/2023     (No Known Allergies)     Current Medications:  Current Outpatient Medications   Medication Sig Dispense Refill     ammonium lactate (LAC-HYDRIN) 12 % external lotion APPLY TOPICALLY TO THE AFFECTED AREA TWICE DAILY AS NEEDED       aspirin (ASA) 81 MG EC tablet Take 1 tablet (81 mg) by mouth daily 90 tablet 3     atorvastatin (LIPITOR) 80 MG tablet Take 1 tablet by mouth daily       Calcium Carb-Cholecalciferol (CALCIUM 600 + D PO) Take 1 tablet by mouth daily       capsaicin (ZOSTRIX) 0.025 % external cream capsaicin 0.025 % topical cream   BALDEMAR 1 APPLICATION AA TID PRN P       cetirizine (ZYRTEC) 10 MG tablet Take 1 tablet by mouth daily at 2 pm       cholecalciferol (VITAMIN D3) 125 mcg (5000 units) capsule TAKE 1 CAPSULE BY MOUTH ONCE DAILY       cyclobenzaprine (FLEXERIL) 10 MG tablet cyclobenzaprine 10 mg tablet   TK 1 T PO TID PRF MUSCLE SPASM       DOCUSATE SODIUM PO Take 100 mg by mouth daily       erythromycin (ROMYCIN) 5 MG/GM ophthalmic ointment APPLY OINTMENT TO INCISION LINES THREE TIMES DAILY FOR 7 DAYS THEN EVERY AT BEDTIME FOR 7 DAYS       fluticasone (FLONASE) 50 MCG/ACT nasal spray SHAKE LIQUID AND USE 1 SPRAY IN EACH NOSTRIL EVERY DAY AS NEEDED       HYDROcodone-acetaminophen (NORCO) 5-325 MG tablet Take 1 tablet by mouth every 6 hours as needed       hydroxyurea (HYDREA) 500 MG capsule Take 1 capsule (500 mg) by mouth daily 90 capsule 3     ibuprofen (ADVIL/MOTRIN) 600 MG tablet        methocarbamol (ROBAXIN) 500  MG tablet TAKE 1 TABLET BY MOUTH TWICE DAILY AS NEEDED       omeprazole (PRILOSEC) 10 MG DR capsule Take 2 capsules (20 mg) by mouth daily 30 capsule 3     polyethylene glycol (MIRALAX) 17 GM/Dose powder Take 17 g by mouth daily as needed for constipation 510 g 1     propranolol ER (INDERAL LA) 160 MG 24 hr capsule        QUETIAPINE FUMARATE PO Take 50 mg by mouth       SUMAtriptan (IMITREX) 50 MG tablet Take 50 mg by mouth       venlafaxine (EFFEXOR-XR) 75 MG 24 hr capsule venlafaxine ER 75 mg capsule,extended release 24 hr   TAKE ONE CAPSULE BY MOUTH ONCE DAILY       ACCU-CHEK GUIDE test strip  (Patient not taking: Reported on 3/16/2023)       blood glucose (ACCU-CHEK FASTCLIX) lancing device 1 kit by Other route (Patient not taking: Reported on 3/16/2023)       blood glucose (NO BRAND SPECIFIED) test strip USE AS DIRECTED TO TEST BLOOD SUGAR TWICE DAILY (Patient not taking: Reported on 3/16/2023)       blood glucose monitoring (ACCU-CHEK FASTCLIX) lancets  (Patient not taking: Reported on 3/16/2023)        Family History:  Family History   Problem Relation Age of Onset     Glaucoma No family hx of      Macular Degeneration No family hx of      No family history of bleeding or clotting disorder or cancers.    Social History:  Social History     Socioeconomic History     Marital status: Single     Spouse name: Not on file     Number of children: Not on file     Years of education: Not on file     Highest education level: Not on file   Occupational History     Not on file   Tobacco Use     Smoking status: Never     Smokeless tobacco: Never   Substance and Sexual Activity     Alcohol use: No     Alcohol/week: 0.0 standard drinks     Drug use: No     Sexual activity: Not Currently     Partners: Male   Other Topics Concern     Not on file   Social History Narrative     Not on file     Social Determinants of Health     Financial Resource Strain: Not on file   Food Insecurity: Not on file   Transportation Needs: Not on  file   Physical Activity: Not on file   Stress: Not on file   Social Connections: Not on file   Intimate Partner Violence: Not At Risk     Fear of Current or Ex-Partner: No     Emotionally Abused: No     Physically Abused: No     Sexually Abused: No   Housing Stability: Not on file     Physical Exam:  BP (!) 160/61 (BP Location: Right arm, Patient Position: Sitting, Cuff Size: Adult Regular)   Pulse 96   Temp 99  F (37.2  C) (Tympanic)   Resp 16   Wt 90.4 kg (199 lb 3.2 oz)   SpO2 96%   BMI 29.42 kg/m       Wt Readings from Last 4 Encounters:   03/16/23 90.4 kg (199 lb 3.2 oz)   11/22/22 88.6 kg (195 lb 4.8 oz)   09/15/22 88.5 kg (195 lb 1.6 oz)   05/12/22 85.5 kg (188 lb 9.6 oz)       CONSTITUTIONAL: No apparent distress  EYES: PERRLA, there is mild pallor but no jaundice.  ENT/MOUTH: Ears unremarkable. No oral lesions  CVS: s1s2 normal  RESPIRATORY: Chest is clear  GI: Abdomen is benign  NEURO: Alert and oriented ×3  INTEGUMENT: no concerning skin rashes   LYMPHATIC: no palpable lymphadenopathy  MUSCULOSKELETAL: Unremarkable. No bony tenderness.   EXTREMITIES: no pedal edema  PSYCH: Mentation, mood and affect are appropriate          Laboratory/Imaging Studies      Reviewed  3/16/2023  CBC shows WBC 7.3.  Hemoglobin 8.7.  Platelets 660.  Normal WBC differential.  CMP and LDH are pending.    9/15/2022.    CBC showed WBC 6.4.  Hemoglobin 10.5.  Platelets 598.   Chemistries is unremarkable.  LDH is pending.    In July 2022, LDH was 465 and chemistry was unremarkable.          2/9/2022  LDH was 451.  Iron studies were unremarkable.  B12 was normal.  Erythropoietin 82.1.    Ultrasound of the abdomen on 2/9/2022 shows mildly enlarged spleen at 14.3 cm.    11/12/2021   bone marrow biopsy shows myeloproliferative neoplasm.  There is normocellular marrow with trilineage hematopoiesis and increased and atypical megakaryocytes and 1% blasts.  Mild to moderate increase in marrow reticulin fibrosis (MF 1-2 of 3) with  features suggestive of osteosclerosis.  This is most likely essential thrombocytosis with early myelofibrosis.  A distinction between primary myelofibrosis and eating with early myelofibrosis is difficult.    Concurrent flow cytometry ([id number]) shows polytypic B cells, a small unusual T cell population similar to that detected in a recent peripheral blood flow cytometry study, no increase in myeloid blasts, and no abnormal myeloid blast population. There is no definitive morphologic or immunohistochemical correlate to the unusual T cells detected by flow cytometry, which may be present in the marrow or may reflect peripheral blood contamination.     Cytogenetics is 46XX.      Flow cytometry showed rare circulating CD34 positive myeloid blasts (1.3%), unusual CD8-positive CD57-positive T cells (1.6%) was present, and the B cells were polytypic.  The findings are not defintivie for a T cell lymphoproliferative disorder or a high grade myeloid neoplasm.    NGS panel shows CALR L367fs.  Low level JAK2 gene variant: p.V617F (1.6%) was again detected ( Off note in 2006, JAK2 mutation was detected by PCR. )        CT chest abdomen and pelvis on 10/22/2021 does not show any acute findings.  It showed borderline splenomegaly and nonspecific heterogenous sclerotic  appearance of the vertebrae and pelvic bones.    Bilateral lower extremity ultrasound on 10/22/2021 does not show evidence of DVT.          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  CALR L367fs positive.  Low level JAK2 gene variant: p.V617F (1.6%)  She has had thrombocytosis, anemia, left shifted WBC and has borderline splenomegaly.  LDH is 463.    She has been on Hydrea 500 mg since mid December 2021.  She is also taking aspirin.     Overall feels well.  She is more anemic now although WBC and platelets are stable to slightly more elevated.  LDH is pending.    For now I would recommend  continuing with same dose of Hydrea Hydrea 500 mg daily and daily baby aspirin.  Check labs more frequently every month.      For anemia, I will also add iron studies and other work-up to labs from today.      Previously she was also evaluated by  from bone marrow transplant department whom she saw on 12/29/2021.  He also agrees with current management of aspirin and hydroxyurea.    Elevated blood pressure.    Her blood pressure in the clinic was elevated.    She is asymptomatic.  I recommend that she checks her blood pressures at home regularly when she is relaxed and keep a log of it.  If it is persistently high then she should talk to her primary care physician for better blood pressure management.  I am not making changes to her medications today      We did not address the following today    Pelvic/back pain and extreme fatigue- overall better. CT CAP was essentially unremarkable except borderline splenomegaly     Leg swelling- R>L. US did not show any DVT- swelling has resolved.       Labs monthly and see me back in 3 months.    All questions answered.  She is agreeable and comfortable with the plan.  Eddie Duong MD

## 2023-03-16 NOTE — LETTER
Date:March 17, 2023      Provider requested that no letter be sent. Do not send.       [facility]

## 2023-03-16 NOTE — NURSING NOTE
"Oncology Rooming Note    March 16, 2023 10:06 AM   Nancy Rodríguez is a 63 year old female who presents for:    Chief Complaint   Patient presents with     Blood Draw     Labs drawn by venipuncture by rn in lab. Vital signs taken.     Oncology Clinic Visit     MPN (myeloproliferative neoplasm)     Initial Vitals: BP (!) 160/61 (BP Location: Right arm, Patient Position: Sitting, Cuff Size: Adult Regular)   Pulse 96   Temp 99  F (37.2  C) (Tympanic)   Resp 16   Wt 90.4 kg (199 lb 3.2 oz)   SpO2 96%   BMI 29.42 kg/m   Estimated body mass index is 29.42 kg/m  as calculated from the following:    Height as of 11/22/22: 1.753 m (5' 9\").    Weight as of this encounter: 90.4 kg (199 lb 3.2 oz). Body surface area is 2.1 meters squared.  No Pain (0) Comment: Data Unavailable   No LMP recorded. Patient is postmenopausal.  Allergies reviewed: Yes  Medications reviewed: Yes    Medications: MEDICATION REFILLS NEEDED TODAY. Provider was NOT notified.   Pt req refill of hydrocodone-acetaminophen.    Pharmacy name entered into [facility]:    [id number] - [place], [place] - [address] AT [facility] [address]  [id number] - [place], [place] - [address] AT [facility] [address]    Clinical concerns: No new clinical concerns other than reason for visit today.     Lianne Butcher, EMT            "

## 2023-03-16 NOTE — NURSING NOTE
Chief Complaint   Patient presents with     Blood Draw     Labs drawn by venipuncture by rn in lab. Vital signs taken.     Labs drawn by venipuncture by RN in lab. Vital signs taken. Pt checked in to next appointment.    Tiki Acosta RN

## 2023-03-17 LAB — EPO SERPL-ACNC: 98 MU/ML

## 2023-04-13 ENCOUNTER — LAB (OUTPATIENT)
Dept: LAB | Facility: CLINIC | Age: 63
End: 2023-04-13
Attending: INTERNAL MEDICINE
Payer: COMMERCIAL

## 2023-04-13 DIAGNOSIS — D47.1 MPN (MYELOPROLIFERATIVE NEOPLASM) (H): ICD-10-CM

## 2023-04-13 DIAGNOSIS — D64.9 ANEMIA, UNSPECIFIED TYPE: ICD-10-CM

## 2023-04-13 LAB
ALBUMIN SERPL BCG-MCNC: 4.5 G/DL (ref 3.5–5.2)
ALP SERPL-CCNC: 73 U/L (ref 35–104)
ALT SERPL W P-5'-P-CCNC: 16 U/L (ref 10–35)
ANION GAP SERPL CALCULATED.3IONS-SCNC: 8 MMOL/L (ref 7–15)
AST SERPL W P-5'-P-CCNC: 17 U/L (ref 10–35)
BASOPHILS # BLD AUTO: 0.1 10E3/UL (ref 0–0.2)
BASOPHILS NFR BLD AUTO: 1 %
BILIRUB SERPL-MCNC: 0.3 MG/DL
BUN SERPL-MCNC: 11.9 MG/DL (ref 8–23)
CALCIUM SERPL-MCNC: 9.5 MG/DL (ref 8.8–10.2)
CHLORIDE SERPL-SCNC: 104 MMOL/L (ref 98–107)
CREAT SERPL-MCNC: 0.53 MG/DL (ref 0.51–0.95)
DEPRECATED HCO3 PLAS-SCNC: 28 MMOL/L (ref 22–29)
EOSINOPHIL # BLD AUTO: 0.1 10E3/UL (ref 0–0.7)
EOSINOPHIL NFR BLD AUTO: 1 %
ERYTHROCYTE [DISTWIDTH] IN BLOOD BY AUTOMATED COUNT: 19.1 % (ref 10–15)
FOLATE SERPL-MCNC: 7.3 NG/ML (ref 4.6–34.8)
GFR SERPL CREATININE-BSD FRML MDRD: >90 ML/MIN/1.73M2
GLUCOSE SERPL-MCNC: 135 MG/DL (ref 70–99)
HCT VFR BLD AUTO: 31.6 % (ref 35–47)
HGB BLD-MCNC: 9.7 G/DL (ref 11.7–15.7)
IMM GRANULOCYTES # BLD: 0.1 10E3/UL
IMM GRANULOCYTES NFR BLD: 1 %
LDH SERPL L TO P-CCNC: 446 U/L (ref 0–250)
LYMPHOCYTES # BLD AUTO: 2 10E3/UL (ref 0.8–5.3)
LYMPHOCYTES NFR BLD AUTO: 31 %
MCH RBC QN AUTO: 29.3 PG (ref 26.5–33)
MCHC RBC AUTO-ENTMCNC: 30.7 G/DL (ref 31.5–36.5)
MCV RBC AUTO: 96 FL (ref 78–100)
MONOCYTES # BLD AUTO: 0.7 10E3/UL (ref 0–1.3)
MONOCYTES NFR BLD AUTO: 11 %
NEUTROPHILS # BLD AUTO: 3.5 10E3/UL (ref 1.6–8.3)
NEUTROPHILS NFR BLD AUTO: 55 %
NRBC # BLD AUTO: 0.2 10E3/UL
NRBC BLD AUTO-RTO: 3 /100
PLATELET # BLD AUTO: 690 10E3/UL (ref 150–450)
POTASSIUM SERPL-SCNC: 4.2 MMOL/L (ref 3.4–5.3)
PROT SERPL-MCNC: 7.5 G/DL (ref 6.4–8.3)
RBC # BLD AUTO: 3.31 10E6/UL (ref 3.8–5.2)
SODIUM SERPL-SCNC: 140 MMOL/L (ref 136–145)
WBC # BLD AUTO: 6.4 10E3/UL (ref 4–11)

## 2023-04-13 PROCEDURE — 83615 LACTATE (LD) (LDH) ENZYME: CPT

## 2023-04-13 PROCEDURE — 80053 COMPREHEN METABOLIC PANEL: CPT

## 2023-04-13 PROCEDURE — 36415 COLL VENOUS BLD VENIPUNCTURE: CPT

## 2023-04-13 PROCEDURE — 82746 ASSAY OF FOLIC ACID SERUM: CPT

## 2023-04-13 PROCEDURE — 85004 AUTOMATED DIFF WBC COUNT: CPT

## 2023-04-13 NOTE — NURSING NOTE
Chief Complaint   Patient presents with     Labs Only     Labs collected from venipuncture by RN.      Ciara Kaye, RN

## 2023-04-16 ENCOUNTER — HEALTH MAINTENANCE LETTER (OUTPATIENT)
Age: 63
End: 2023-04-16

## 2023-04-28 ENCOUNTER — LAB REQUISITION (OUTPATIENT)
Dept: LAB | Facility: CLINIC | Age: 63
End: 2023-04-28
Payer: COMMERCIAL

## 2023-04-28 DIAGNOSIS — E11.9 TYPE 2 DIABETES MELLITUS WITHOUT COMPLICATIONS (H): ICD-10-CM

## 2023-04-28 DIAGNOSIS — R30.0 DYSURIA: ICD-10-CM

## 2023-04-28 PROCEDURE — 87086 URINE CULTURE/COLONY COUNT: CPT | Mod: ORL | Performed by: FAMILY MEDICINE

## 2023-04-30 LAB — BACTERIA UR CULT: NORMAL

## 2023-05-18 ENCOUNTER — LAB (OUTPATIENT)
Dept: LAB | Facility: CLINIC | Age: 63
End: 2023-05-18
Attending: INTERNAL MEDICINE
Payer: COMMERCIAL

## 2023-05-18 DIAGNOSIS — D47.1 MPN (MYELOPROLIFERATIVE NEOPLASM) (H): ICD-10-CM

## 2023-05-18 LAB
ALBUMIN SERPL BCG-MCNC: 4.2 G/DL (ref 3.5–5.2)
ALP SERPL-CCNC: 84 U/L (ref 35–104)
ALT SERPL W P-5'-P-CCNC: 16 U/L (ref 10–35)
ANION GAP SERPL CALCULATED.3IONS-SCNC: 9 MMOL/L (ref 7–15)
AST SERPL W P-5'-P-CCNC: 23 U/L (ref 10–35)
BASOPHILS # BLD AUTO: 0.1 10E3/UL (ref 0–0.2)
BASOPHILS NFR BLD AUTO: 1 %
BILIRUB SERPL-MCNC: 0.2 MG/DL
BUN SERPL-MCNC: 10.5 MG/DL (ref 8–23)
CALCIUM SERPL-MCNC: 9.4 MG/DL (ref 8.8–10.2)
CHLORIDE SERPL-SCNC: 104 MMOL/L (ref 98–107)
CREAT SERPL-MCNC: 0.49 MG/DL (ref 0.51–0.95)
DEPRECATED HCO3 PLAS-SCNC: 26 MMOL/L (ref 22–29)
EOSINOPHIL # BLD AUTO: 0 10E3/UL (ref 0–0.7)
EOSINOPHIL NFR BLD AUTO: 1 %
ERYTHROCYTE [DISTWIDTH] IN BLOOD BY AUTOMATED COUNT: 20.9 % (ref 10–15)
GFR SERPL CREATININE-BSD FRML MDRD: >90 ML/MIN/1.73M2
GLUCOSE SERPL-MCNC: 124 MG/DL (ref 70–99)
HCT VFR BLD AUTO: 31.9 % (ref 35–47)
HGB BLD-MCNC: 9.6 G/DL (ref 11.7–15.7)
IMM GRANULOCYTES # BLD: 0.3 10E3/UL
IMM GRANULOCYTES NFR BLD: 4 %
LDH SERPL L TO P-CCNC: 534 U/L (ref 0–250)
LYMPHOCYTES # BLD AUTO: 1.8 10E3/UL (ref 0.8–5.3)
LYMPHOCYTES NFR BLD AUTO: 27 %
MCH RBC QN AUTO: 28.3 PG (ref 26.5–33)
MCHC RBC AUTO-ENTMCNC: 30.1 G/DL (ref 31.5–36.5)
MCV RBC AUTO: 94 FL (ref 78–100)
MONOCYTES # BLD AUTO: 0.8 10E3/UL (ref 0–1.3)
MONOCYTES NFR BLD AUTO: 12 %
NEUTROPHILS # BLD AUTO: 3.8 10E3/UL (ref 1.6–8.3)
NEUTROPHILS NFR BLD AUTO: 55 %
NRBC # BLD AUTO: 0.3 10E3/UL
NRBC BLD AUTO-RTO: 4 /100
PLATELET # BLD AUTO: 549 10E3/UL (ref 150–450)
POTASSIUM SERPL-SCNC: 4.2 MMOL/L (ref 3.4–5.3)
PROT SERPL-MCNC: 7.2 G/DL (ref 6.4–8.3)
RBC # BLD AUTO: 3.39 10E6/UL (ref 3.8–5.2)
SODIUM SERPL-SCNC: 139 MMOL/L (ref 136–145)
WBC # BLD AUTO: 6.8 10E3/UL (ref 4–11)

## 2023-05-18 PROCEDURE — 85025 COMPLETE CBC W/AUTO DIFF WBC: CPT

## 2023-05-18 PROCEDURE — 83615 LACTATE (LD) (LDH) ENZYME: CPT

## 2023-05-18 PROCEDURE — 80053 COMPREHEN METABOLIC PANEL: CPT

## 2023-05-18 PROCEDURE — 36415 COLL VENOUS BLD VENIPUNCTURE: CPT

## 2023-05-18 NOTE — NURSING NOTE
Chief Complaint   Patient presents with     Blood Draw     Labs collected from venipuncture by FRANK.      Amy RUBI RN PHN BSN  BMT/Oncology Lab

## 2023-06-15 ENCOUNTER — LAB (OUTPATIENT)
Dept: LAB | Facility: CLINIC | Age: 63
End: 2023-06-15
Attending: INTERNAL MEDICINE
Payer: COMMERCIAL

## 2023-06-15 ENCOUNTER — ONCOLOGY VISIT (OUTPATIENT)
Dept: ONCOLOGY | Facility: CLINIC | Age: 63
End: 2023-06-15
Attending: INTERNAL MEDICINE
Payer: COMMERCIAL

## 2023-06-15 VITALS
OXYGEN SATURATION: 99 % | SYSTOLIC BLOOD PRESSURE: 138 MMHG | RESPIRATION RATE: 18 BRPM | DIASTOLIC BLOOD PRESSURE: 78 MMHG | WEIGHT: 192.4 LBS | TEMPERATURE: 98 F | BODY MASS INDEX: 28.41 KG/M2 | HEART RATE: 71 BPM

## 2023-06-15 DIAGNOSIS — D47.1 MPN (MYELOPROLIFERATIVE NEOPLASM) (H): ICD-10-CM

## 2023-06-15 LAB
ALBUMIN SERPL BCG-MCNC: 4.1 G/DL (ref 3.5–5.2)
ALP SERPL-CCNC: 78 U/L (ref 35–104)
ALT SERPL W P-5'-P-CCNC: 18 U/L (ref 0–50)
ANION GAP SERPL CALCULATED.3IONS-SCNC: 8 MMOL/L (ref 7–15)
AST SERPL W P-5'-P-CCNC: 22 U/L (ref 0–45)
BASOPHILS # BLD AUTO: 0.1 10E3/UL (ref 0–0.2)
BASOPHILS NFR BLD AUTO: 1 %
BILIRUB SERPL-MCNC: 0.2 MG/DL
BUN SERPL-MCNC: 11.7 MG/DL (ref 8–23)
CALCIUM SERPL-MCNC: 9.4 MG/DL (ref 8.8–10.2)
CHLORIDE SERPL-SCNC: 106 MMOL/L (ref 98–107)
CREAT SERPL-MCNC: 0.49 MG/DL (ref 0.51–0.95)
DEPRECATED HCO3 PLAS-SCNC: 28 MMOL/L (ref 22–29)
EOSINOPHIL # BLD AUTO: 0.1 10E3/UL (ref 0–0.7)
EOSINOPHIL NFR BLD AUTO: 1 %
ERYTHROCYTE [DISTWIDTH] IN BLOOD BY AUTOMATED COUNT: 20.9 % (ref 10–15)
GFR SERPL CREATININE-BSD FRML MDRD: >90 ML/MIN/1.73M2
GLUCOSE SERPL-MCNC: 125 MG/DL (ref 70–99)
HCT VFR BLD AUTO: 33 % (ref 35–47)
HGB BLD-MCNC: 10.4 G/DL (ref 11.7–15.7)
IMM GRANULOCYTES # BLD: 0.1 10E3/UL
IMM GRANULOCYTES NFR BLD: 2 %
LDH SERPL L TO P-CCNC: 445 U/L (ref 0–250)
LYMPHOCYTES # BLD AUTO: 1.7 10E3/UL (ref 0.8–5.3)
LYMPHOCYTES NFR BLD AUTO: 29 %
MCH RBC QN AUTO: 29.5 PG (ref 26.5–33)
MCHC RBC AUTO-ENTMCNC: 31.5 G/DL (ref 31.5–36.5)
MCV RBC AUTO: 94 FL (ref 78–100)
MONOCYTES # BLD AUTO: 0.6 10E3/UL (ref 0–1.3)
MONOCYTES NFR BLD AUTO: 9 %
NEUTROPHILS # BLD AUTO: 3.5 10E3/UL (ref 1.6–8.3)
NEUTROPHILS NFR BLD AUTO: 58 %
NRBC # BLD AUTO: 0.2 10E3/UL
NRBC BLD AUTO-RTO: 3 /100
PLATELET # BLD AUTO: 563 10E3/UL (ref 150–450)
POTASSIUM SERPL-SCNC: 4.2 MMOL/L (ref 3.4–5.3)
PROT SERPL-MCNC: 7 G/DL (ref 6.4–8.3)
RBC # BLD AUTO: 3.52 10E6/UL (ref 3.8–5.2)
SODIUM SERPL-SCNC: 142 MMOL/L (ref 136–145)
WBC # BLD AUTO: 6 10E3/UL (ref 4–11)

## 2023-06-15 PROCEDURE — 83615 LACTATE (LD) (LDH) ENZYME: CPT

## 2023-06-15 PROCEDURE — G0463 HOSPITAL OUTPT CLINIC VISIT: HCPCS | Performed by: INTERNAL MEDICINE

## 2023-06-15 PROCEDURE — 36415 COLL VENOUS BLD VENIPUNCTURE: CPT

## 2023-06-15 PROCEDURE — 99214 OFFICE O/P EST MOD 30 MIN: CPT | Performed by: INTERNAL MEDICINE

## 2023-06-15 PROCEDURE — 85025 COMPLETE CBC W/AUTO DIFF WBC: CPT

## 2023-06-15 PROCEDURE — 80053 COMPREHEN METABOLIC PANEL: CPT

## 2023-06-15 RX ORDER — ASPIRIN 81 MG/1
81 TABLET ORAL DAILY
Qty: 90 TABLET | Refills: 3 | Status: SHIPPED | OUTPATIENT
Start: 2023-06-15 | End: 2023-12-14

## 2023-06-15 ASSESSMENT — PAIN SCALES - GENERAL: PAINLEVEL: NO PAIN (0)

## 2023-06-15 NOTE — LETTER
6/15/2023         RE: Nancy Rodríguez  [address]  [address]  [place] [address]        Dear Colleague,    Thank you for referring your patient, Nancy Rodríguez, to the [facility]. Please see a copy of my visit note below.    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  CALR L367fs positive.  Low level JAK2 gene variant: p.V617F (1.6%)    Primary Physician: Tasha Nick     History Of Present Illness:  Ms. Rodríguez is a 63 year old female who presents with thrombocytosis, anemia, left shifted WBC and blasts on smear.    I initially saw her on 9/28/2021.  Please see previous note for details.  I have copied and updated from prior note.    Looking back she has had thrombocytosis and anemia for a number of years.  On 8/20/2021, peripheral blood smear showed left shifted WBC with blast-like cells in addition to anemia and thrombocytosis.      She feels pain in the pelvic area and back pain. Sometimes feels leg are swollen. Right ankle is more swollen. It is not more swollen. This is going on for 4 months. She denies B symptoms. No abnormal bleeding, erythromelalgia, dyspnea. Denies significant dyspnea. Feels fatigued. No nausea, vomiting, diarrhea or constipation. Last mammogram was few years ago.      I did further work-up including bone marrow biopsy.    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  CALR L367fs positive.  Low level JAK2 gene variant: p.V617F (1.6%)  She has had thrombocytosis, anemia, left shifted WBC and has borderline splenomegaly.  LDH is 463.      She started taking Hydrea 500 mg daily in mid December 2021.       Interval history.  She comes in today.  A professional [place]  is available through iPad.  She is doing well.  She has been taking Hydrea 500 mg daily and  baby aspirin daily.  She denies any complaints.  No pain.  No swellings.  No GI problems.  No infections.  No shortness of breath.       ROS:  Rest of the comprehensive review of the system was unremarkable.    I reviewed other history in epic as below.      Past Medical/Surgical History:  Past Medical History:   Diagnosis Date    Cancer (H)     Diabetes (H)     Headache     Nonsenile cataract      Past Surgical History:   Procedure Laterality Date    ANKLE SURGERY      left     Allergies:  Allergies as of 06/15/2023    (No Known Allergies)     Current Medications:  Current Outpatient Medications   Medication Sig Dispense Refill    ammonium lactate (LAC-HYDRIN) 12 % external lotion APPLY TOPICALLY TO THE AFFECTED AREA TWICE DAILY AS NEEDED      aspirin (ASA) 81 MG EC tablet Take 1 tablet (81 mg) by mouth daily 90 tablet 3    atorvastatin (LIPITOR) 80 MG tablet Take 1 tablet by mouth daily      Calcium Carb-Cholecalciferol (CALCIUM 600 + D PO) Take 1 tablet by mouth daily      capsaicin (ZOSTRIX) 0.025 % external cream capsaicin 0.025 % topical cream   BALDEMAR 1 APPLICATION AA TID PRN P      cetirizine (ZYRTEC) 10 MG tablet Take 1 tablet by mouth daily at 2 pm      cholecalciferol (VITAMIN D3) 125 mcg (5000 units) capsule TAKE 1 CAPSULE BY MOUTH ONCE DAILY      cyclobenzaprine (FLEXERIL) 10 MG tablet cyclobenzaprine 10 mg tablet   TK 1 T PO TID PRF MUSCLE SPASM      DOCUSATE SODIUM PO Take 100 mg by mouth daily      erythromycin (ROMYCIN) 5 MG/GM ophthalmic ointment APPLY OINTMENT TO INCISION LINES THREE TIMES DAILY FOR 7 DAYS THEN EVERY AT BEDTIME FOR 7 DAYS      fluticasone (FLONASE) 50 MCG/ACT nasal spray SHAKE LIQUID AND USE 1 SPRAY IN EACH NOSTRIL EVERY DAY AS NEEDED      HYDROcodone-acetaminophen (NORCO) 5-325 MG tablet Take 1 tablet by mouth every 6 hours as needed      hydroxyurea (HYDREA) 500 MG capsule Take 1 capsule (500 mg) by mouth daily 90 capsule 3    ibuprofen (ADVIL/MOTRIN) 600 MG tablet        methocarbamol (ROBAXIN) 500 MG tablet TAKE 1 TABLET BY MOUTH TWICE DAILY AS NEEDED      omeprazole (PRILOSEC) 10 MG DR capsule Take 2 capsules (20 mg) by mouth daily 30 capsule 3    polyethylene glycol (MIRALAX) 17 GM/Dose powder Take 17 g by mouth daily as needed for constipation 510 g 1    propranolol ER (INDERAL LA) 160 MG 24 hr capsule       QUETIAPINE FUMARATE PO Take 50 mg by mouth      venlafaxine (EFFEXOR-XR) 75 MG 24 hr capsule venlafaxine ER 75 mg capsule,extended release 24 hr   TAKE ONE CAPSULE BY MOUTH ONCE DAILY      ACCU-CHEK GUIDE test strip  (Patient not taking: Reported on 3/16/2023)      blood glucose (ACCU-CHEK FASTCLIX) lancing device 1 kit by Other route (Patient not taking: Reported on 3/16/2023)      blood glucose (NO BRAND SPECIFIED) test strip USE AS DIRECTED TO TEST BLOOD SUGAR TWICE DAILY (Patient not taking: Reported on 3/16/2023)      blood glucose monitoring (ACCU-CHEK FASTCLIX) lancets  (Patient not taking: Reported on 3/16/2023)        Family History:  Family History   Problem Relation Age of Onset    Glaucoma No family hx of     Macular Degeneration No family hx of      No family history of bleeding or clotting disorder or cancers.    Social History:  Social History     Socioeconomic History    Marital status: Single     Spouse name: Not on file    Number of children: Not on file    Years of education: Not on file    Highest education level: Not on file   Occupational History    Not on file   Tobacco Use    Smoking status: Never    Smokeless tobacco: Never   Vaping Use    Vaping status: Not on file   Substance and Sexual Activity    Alcohol use: No     Alcohol/week: 0.0 standard drinks of alcohol    Drug use: No    Sexual activity: Not Currently     Partners: Male   Other Topics Concern    Not on file   Social History Narrative    Not on file     Social Determinants of Health     Financial Resource Strain: Not on file   Food Insecurity: Not on file   Transportation Needs: Not on  file   Physical Activity: Not on file   Stress: Not on file   Social Connections: Not on file   Intimate Partner Violence: Not At Risk (10/28/2021)    Humiliation, Afraid, Rape, and Kick questionnaire     Fear of Current or Ex-Partner: No     Emotionally Abused: No     Physically Abused: No     Sexually Abused: No   Housing Stability: Not on file     Physical Exam:  /78   Pulse 71   Temp 98  F (36.7  C) (Oral)   Resp 18   Wt 87.3 kg (192 lb 6.4 oz)   SpO2 99%   BMI 28.41 kg/m       Wt Readings from Last 4 Encounters:   06/15/23 87.3 kg (192 lb 6.4 oz)   03/16/23 90.4 kg (199 lb 3.2 oz)   11/22/22 88.6 kg (195 lb 4.8 oz)   09/15/22 88.5 kg (195 lb 1.6 oz)       CONSTITUTIONAL: no acute distress  EYES: PERRLA, no palor or icterus.   ENT/MOUTH: no mouth lesions. Ears normal  CVS: s1s2 no m r g .   RESPIRATORY: clear to auscultation b/l  GI: soft non tender no hepatosplenomegaly  NEURO: AAOX3  Grossly non focal neuro exam  INTEGUMENT: no obvious rashes  LYMPHATIC: no palpable cervical, supraclavicular, axillary or inguinal LAD  MUSCULOSKELETAL: Unremarkable. No bony tenderness.   EXTREMITIES: no edema  PSYCH: Mentation, mood and affect are normal. Decision making capacity is intact      Laboratory/Imaging Studies      Reviewed  6/15/2023  CBC shows WBC 6.  Hemoglobin 10.4.  Platelets 563.    CMP unremarkable      3/16/2023    Ferritin 21, iron 47, TIBC 318.  Iron saturation index 15%.  Erythropoietin was 98.    Folate and B12 was unremarkable.      2/9/2022  LDH was 451.  Iron studies were unremarkable.  B12 was normal.  Erythropoietin 82.1.    Ultrasound of the abdomen on 2/9/2022 shows mildly enlarged spleen at 14.3 cm.    11/12/2021   bone marrow biopsy shows myeloproliferative neoplasm.  There is normocellular marrow with trilineage hematopoiesis and increased and atypical megakaryocytes and 1% blasts.  Mild to moderate increase in marrow reticulin fibrosis (MF 1-2 of 3) with features suggestive  of osteosclerosis.  This is most likely essential thrombocytosis with early myelofibrosis.  A distinction between primary myelofibrosis and eating with early myelofibrosis is difficult.    Concurrent flow cytometry ([id number]) shows polytypic B cells, a small unusual T cell population similar to that detected in a recent peripheral blood flow cytometry study, no increase in myeloid blasts, and no abnormal myeloid blast population. There is no definitive morphologic or immunohistochemical correlate to the unusual T cells detected by flow cytometry, which may be present in the marrow or may reflect peripheral blood contamination.     Cytogenetics is 46XX.      Flow cytometry showed rare circulating CD34 positive myeloid blasts (1.3%), unusual CD8-positive CD57-positive T cells (1.6%) was present, and the B cells were polytypic.  The findings are not defintivie for a T cell lymphoproliferative disorder or a high grade myeloid neoplasm.    NGS panel shows CALR L367fs.  Low level JAK2 gene variant: p.V617F (1.6%) was again detected ( Off note in 2006, JAK2 mutation was detected by PCR. )        CT chest abdomen and pelvis on 10/22/2021 does not show any acute findings.  It showed borderline splenomegaly and nonspecific heterogenous sclerotic  appearance of the vertebrae and pelvic bones.    Bilateral lower extremity ultrasound on 10/22/2021 does not show evidence of DVT.          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  CALR L367fs positive.  Low level JAK2 gene variant: p.V617F (1.6%)  She has had thrombocytosis, anemia, left shifted WBC and has borderline splenomegaly.  LDH is 463.    She has been on Hydrea 500 mg since mid December 2021.  She is also taking aspirin.     She is tolerating this regimen well.  Labs are stable.  She continues to have mild anemia and mild thrombocytosis.  LDH is mildly elevated.    Overall doing well.  We will  continue the same dose of Hydrea.    Check labs every 2 months.    Anemia.  From Hydrea and disease.  Previous iron studies were normal.      Previously she was also evaluated by  from bone marrow transplant department whom she saw on 12/29/2021.  He also agrees with current management of aspirin and hydroxyurea.    She mentions to me that she has not followed up with PCP.  I strongly encouraged her to make sure that she follows up with PCP regularly.     We did not address the following today    Pelvic/back pain and extreme fatigue- overall better. CT CAP was essentially unremarkable except borderline splenomegaly     Leg swelling- R>L. US did not show any DVT- swelling has resolved.       We will check labs every 2 months and I will see her back in 6 months    All questions answered.  She is agreeable and comfortable with the plan.  Eddie Duong MD

## 2023-06-15 NOTE — PROGRESS NOTES
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  CALR L367fs positive.  Low level JAK2 gene variant: p.V617F (1.6%)    Primary Physician: Tasha Nick     History Of Present Illness:  Ms. Rodríguez is a 63 year old female who presents with thrombocytosis, anemia, left shifted WBC and blasts on smear.    I initially saw her on 9/28/2021.  Please see previous note for details.  I have copied and updated from prior note.    Looking back she has had thrombocytosis and anemia for a number of years.  On 8/20/2021, peripheral blood smear showed left shifted WBC with blast-like cells in addition to anemia and thrombocytosis.      She feels pain in the pelvic area and back pain. Sometimes feels leg are swollen. Right ankle is more swollen. It is not more swollen. This is going on for 4 months. She denies B symptoms. No abnormal bleeding, erythromelalgia, dyspnea. Denies significant dyspnea. Feels fatigued. No nausea, vomiting, diarrhea or constipation. Last mammogram was few years ago.      I did further work-up including bone marrow biopsy.    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  CALR L367fs positive.  Low level JAK2 gene variant: p.V617F (1.6%)  She has had thrombocytosis, anemia, left shifted WBC and has borderline splenomegaly.  LDH is 463.      She started taking Hydrea 500 mg daily in mid December 2021.       Interval history.  She comes in today.  A professional [place]  is available through iPad.  She is doing well.  She has been taking Hydrea 500 mg daily and baby aspirin daily.  She denies any complaints.  No pain.  No swellings.  No GI problems.  No infections.  No shortness of breath.       ROS:  Rest of the comprehensive review of the system was unremarkable.    I reviewed other history in epic as below.      Past  Medical/Surgical History:  Past Medical History:   Diagnosis Date     Cancer (H)      Diabetes (H)      Headache      Nonsenile cataract      Past Surgical History:   Procedure Laterality Date     ANKLE SURGERY      left     Allergies:  Allergies as of 06/15/2023     (No Known Allergies)     Current Medications:  Current Outpatient Medications   Medication Sig Dispense Refill     ammonium lactate (LAC-HYDRIN) 12 % external lotion APPLY TOPICALLY TO THE AFFECTED AREA TWICE DAILY AS NEEDED       aspirin (ASA) 81 MG EC tablet Take 1 tablet (81 mg) by mouth daily 90 tablet 3     atorvastatin (LIPITOR) 80 MG tablet Take 1 tablet by mouth daily       Calcium Carb-Cholecalciferol (CALCIUM 600 + D PO) Take 1 tablet by mouth daily       capsaicin (ZOSTRIX) 0.025 % external cream capsaicin 0.025 % topical cream   BALDEMAR 1 APPLICATION AA TID PRN P       cetirizine (ZYRTEC) 10 MG tablet Take 1 tablet by mouth daily at 2 pm       cholecalciferol (VITAMIN D3) 125 mcg (5000 units) capsule TAKE 1 CAPSULE BY MOUTH ONCE DAILY       cyclobenzaprine (FLEXERIL) 10 MG tablet cyclobenzaprine 10 mg tablet   TK 1 T PO TID PRF MUSCLE SPASM       DOCUSATE SODIUM PO Take 100 mg by mouth daily       erythromycin (ROMYCIN) 5 MG/GM ophthalmic ointment APPLY OINTMENT TO INCISION LINES THREE TIMES DAILY FOR 7 DAYS THEN EVERY AT BEDTIME FOR 7 DAYS       fluticasone (FLONASE) 50 MCG/ACT nasal spray SHAKE LIQUID AND USE 1 SPRAY IN EACH NOSTRIL EVERY DAY AS NEEDED       HYDROcodone-acetaminophen (NORCO) 5-325 MG tablet Take 1 tablet by mouth every 6 hours as needed       hydroxyurea (HYDREA) 500 MG capsule Take 1 capsule (500 mg) by mouth daily 90 capsule 3     ibuprofen (ADVIL/MOTRIN) 600 MG tablet        methocarbamol (ROBAXIN) 500 MG tablet TAKE 1 TABLET BY MOUTH TWICE DAILY AS NEEDED       omeprazole (PRILOSEC) 10 MG DR capsule Take 2 capsules (20 mg) by mouth daily 30 capsule 3     polyethylene glycol (MIRALAX) 17 GM/Dose powder Take 17 g by mouth  daily as needed for constipation 510 g 1     propranolol ER (INDERAL LA) 160 MG 24 hr capsule        QUETIAPINE FUMARATE PO Take 50 mg by mouth       venlafaxine (EFFEXOR-XR) 75 MG 24 hr capsule venlafaxine ER 75 mg capsule,extended release 24 hr   TAKE ONE CAPSULE BY MOUTH ONCE DAILY       ACCU-CHEK GUIDE test strip  (Patient not taking: Reported on 3/16/2023)       blood glucose (ACCU-CHEK FASTCLIX) lancing device 1 kit by Other route (Patient not taking: Reported on 3/16/2023)       blood glucose (NO BRAND SPECIFIED) test strip USE AS DIRECTED TO TEST BLOOD SUGAR TWICE DAILY (Patient not taking: Reported on 3/16/2023)       blood glucose monitoring (ACCU-CHEK FASTCLIX) lancets  (Patient not taking: Reported on 3/16/2023)        Family History:  Family History   Problem Relation Age of Onset     Glaucoma No family hx of      Macular Degeneration No family hx of      No family history of bleeding or clotting disorder or cancers.    Social History:  Social History     Socioeconomic History     Marital status: Single     Spouse name: Not on file     Number of children: Not on file     Years of education: Not on file     Highest education level: Not on file   Occupational History     Not on file   Tobacco Use     Smoking status: Never     Smokeless tobacco: Never   Vaping Use     Vaping status: Not on file   Substance and Sexual Activity     Alcohol use: No     Alcohol/week: 0.0 standard drinks of alcohol     Drug use: No     Sexual activity: Not Currently     Partners: Male   Other Topics Concern     Not on file   Social History Narrative     Not on file     Social Determinants of Health     Financial Resource Strain: Not on file   Food Insecurity: Not on file   Transportation Needs: Not on file   Physical Activity: Not on file   Stress: Not on file   Social Connections: Not on file   Intimate Partner Violence: Not At Risk (10/28/2021)    Humiliation, Afraid, Rape, and Kick questionnaire      Fear of Current or  Ex-Partner: No      Emotionally Abused: No      Physically Abused: No      Sexually Abused: No   Housing Stability: Not on file     Physical Exam:  /78   Pulse 71   Temp 98  F (36.7  C) (Oral)   Resp 18   Wt 87.3 kg (192 lb 6.4 oz)   SpO2 99%   BMI 28.41 kg/m       Wt Readings from Last 4 Encounters:   06/15/23 87.3 kg (192 lb 6.4 oz)   03/16/23 90.4 kg (199 lb 3.2 oz)   11/22/22 88.6 kg (195 lb 4.8 oz)   09/15/22 88.5 kg (195 lb 1.6 oz)       CONSTITUTIONAL: no acute distress  EYES: PERRLA, no palor or icterus.   ENT/MOUTH: no mouth lesions. Ears normal  CVS: s1s2 no m r g .   RESPIRATORY: clear to auscultation b/l  GI: soft non tender no hepatosplenomegaly  NEURO: AAOX3  Grossly non focal neuro exam  INTEGUMENT: no obvious rashes  LYMPHATIC: no palpable cervical, supraclavicular, axillary or inguinal LAD  MUSCULOSKELETAL: Unremarkable. No bony tenderness.   EXTREMITIES: no edema  PSYCH: Mentation, mood and affect are normal. Decision making capacity is intact      Laboratory/Imaging Studies      Reviewed  6/15/2023  CBC shows WBC 6.  Hemoglobin 10.4.  Platelets 563.    CMP unremarkable      3/16/2023    Ferritin 21, iron 47, TIBC 318.  Iron saturation index 15%.  Erythropoietin was 98.    Folate and B12 was unremarkable.      2/9/2022  LDH was 451.  Iron studies were unremarkable.  B12 was normal.  Erythropoietin 82.1.    Ultrasound of the abdomen on 2/9/2022 shows mildly enlarged spleen at 14.3 cm.    11/12/2021   bone marrow biopsy shows myeloproliferative neoplasm.  There is normocellular marrow with trilineage hematopoiesis and increased and atypical megakaryocytes and 1% blasts.  Mild to moderate increase in marrow reticulin fibrosis (MF 1-2 of 3) with features suggestive of osteosclerosis.  This is most likely essential thrombocytosis with early myelofibrosis.  A distinction between primary myelofibrosis and eating with early myelofibrosis is difficult.    Concurrent flow cytometry  ([id number]) shows polytypic B cells, a small unusual T cell population similar to that detected in a recent peripheral blood flow cytometry study, no increase in myeloid blasts, and no abnormal myeloid blast population. There is no definitive morphologic or immunohistochemical correlate to the unusual T cells detected by flow cytometry, which may be present in the marrow or may reflect peripheral blood contamination.     Cytogenetics is 46XX.      Flow cytometry showed rare circulating CD34 positive myeloid blasts (1.3%), unusual CD8-positive CD57-positive T cells (1.6%) was present, and the B cells were polytypic.  The findings are not defintivie for a T cell lymphoproliferative disorder or a high grade myeloid neoplasm.    NGS panel shows CALR L367fs.  Low level JAK2 gene variant: p.V617F (1.6%) was again detected ( Off note in 2006, JAK2 mutation was detected by PCR. )        CT chest abdomen and pelvis on 10/22/2021 does not show any acute findings.  It showed borderline splenomegaly and nonspecific heterogenous sclerotic  appearance of the vertebrae and pelvic bones.    Bilateral lower extremity ultrasound on 10/22/2021 does not show evidence of DVT.          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  CALR L367fs positive.  Low level JAK2 gene variant: p.V617F (1.6%)  She has had thrombocytosis, anemia, left shifted WBC and has borderline splenomegaly.  LDH is 463.    She has been on Hydrea 500 mg since mid December 2021.  She is also taking aspirin.     She is tolerating this regimen well.  Labs are stable.  She continues to have mild anemia and mild thrombocytosis.  LDH is mildly elevated.    Overall doing well.  We will continue the same dose of Hydrea.    Check labs every 2 months.    Anemia.  From Hydrea and disease.  Previous iron studies were normal.      Previously she was also evaluated by  from bone marrow transplant  department whom she saw on 12/29/2021.  He also agrees with current management of aspirin and hydroxyurea.    She mentions to me that she has not followed up with PCP.  I strongly encouraged her to make sure that she follows up with PCP regularly.     We did not address the following today    Pelvic/back pain and extreme fatigue- overall better. CT CAP was essentially unremarkable except borderline splenomegaly     Leg swelling- R>L. US did not show any DVT- swelling has resolved.       We will check labs every 2 months and I will see her back in 6 months    All questions answered.  She is agreeable and comfortable with the plan.  Eddie Duong MD

## 2023-06-15 NOTE — NURSING NOTE
"Oncology Rooming Note    Libby 15, 2023 9:50 AM   Nancy Rodríguez is a 63 year old female who presents for:    Chief Complaint   Patient presents with     Blood Draw     Labs obtained via venipuncture 23 gauge butterfly needle, VS taken, checked into next appt     Oncology Clinic Visit     MPN (myeloproliferative neoplasm)     Initial Vitals: /78   Pulse 71   Temp 98  F (36.7  C) (Oral)   Resp 18   Wt 87.3 kg (192 lb 6.4 oz)   SpO2 99%   BMI 28.41 kg/m   Estimated body mass index is 28.41 kg/m  as calculated from the following:    Height as of 11/22/22: 1.753 m (5' 9\").    Weight as of this encounter: 87.3 kg (192 lb 6.4 oz). Body surface area is 2.06 meters squared.  No Pain (0) Comment: Data Unavailable   No LMP recorded. Patient is postmenopausal.  Allergies reviewed: Yes  Medications reviewed: Yes    Medications: Pt req refills but did not specify, stated she gets refills every 3 months.    Pharmacy name entered into [facility]:    [id number] - [place], [place] - [address] AT [facility] [address]  [id number] - [place], [place] - [address] AT [facility] OF [facility] [address]    Clinical concerns: No new clinical concerns other than reason for visit today.    Lianne Butcher, EMT    "

## 2023-06-18 DIAGNOSIS — K21.9 GASTROESOPHAGEAL REFLUX DISEASE WITHOUT ESOPHAGITIS: ICD-10-CM

## 2023-06-19 NOTE — TELEPHONE ENCOUNTER
Omeprazole Refill   Last prescribing provider: Dr Duong     Last clinic visit date: 6/15/23 Dr Duong     Recommendations for requested medication (if none, N/A): Copied from chart note 6/15/23 Dr Duong     omeprazole (PRILOSEC) 10 MG DR capsule Take 2 capsules (20 mg) by mouth daily 30 capsule 3       Any other pertinent information (if none, N/A): N/A    Refilled: Y/N, if NO, why?

## 2023-06-21 RX ORDER — OMEPRAZOLE 10 MG/1
CAPSULE, DELAYED RELEASE ORAL
Qty: 30 CAPSULE | Refills: 3 | Status: SHIPPED | OUTPATIENT
Start: 2023-06-21 | End: 2023-12-14

## 2023-08-18 ENCOUNTER — LAB REQUISITION (OUTPATIENT)
Dept: LAB | Facility: CLINIC | Age: 63
End: 2023-08-18
Payer: COMMERCIAL

## 2023-08-18 DIAGNOSIS — E11.9 TYPE 2 DIABETES MELLITUS WITHOUT COMPLICATIONS (H): ICD-10-CM

## 2023-08-18 LAB
CHOLEST SERPL-MCNC: 180 MG/DL
HDLC SERPL-MCNC: 35 MG/DL
LDLC SERPL CALC-MCNC: 99 MG/DL
NONHDLC SERPL-MCNC: 145 MG/DL
TRIGL SERPL-MCNC: 228 MG/DL

## 2023-08-18 PROCEDURE — 80061 LIPID PANEL: CPT | Mod: ORL | Performed by: FAMILY MEDICINE

## 2023-09-15 ENCOUNTER — LAB (OUTPATIENT)
Dept: LAB | Facility: CLINIC | Age: 63
End: 2023-09-15
Attending: INTERNAL MEDICINE
Payer: COMMERCIAL

## 2023-09-15 DIAGNOSIS — D47.1 MPN (MYELOPROLIFERATIVE NEOPLASM) (H): ICD-10-CM

## 2023-09-15 LAB
ALBUMIN SERPL BCG-MCNC: 4.4 G/DL (ref 3.5–5.2)
ALP SERPL-CCNC: 63 U/L (ref 35–104)
ALT SERPL W P-5'-P-CCNC: 13 U/L (ref 0–50)
ANION GAP SERPL CALCULATED.3IONS-SCNC: 9 MMOL/L (ref 7–15)
AST SERPL W P-5'-P-CCNC: 18 U/L (ref 0–45)
BASOPHILS # BLD AUTO: 0.1 10E3/UL (ref 0–0.2)
BASOPHILS NFR BLD AUTO: 1 %
BILIRUB SERPL-MCNC: 0.2 MG/DL
BUN SERPL-MCNC: 8.2 MG/DL (ref 8–23)
CALCIUM SERPL-MCNC: 9.5 MG/DL (ref 8.8–10.2)
CHLORIDE SERPL-SCNC: 104 MMOL/L (ref 98–107)
CREAT SERPL-MCNC: 0.5 MG/DL (ref 0.51–0.95)
DEPRECATED HCO3 PLAS-SCNC: 27 MMOL/L (ref 22–29)
EGFRCR SERPLBLD CKD-EPI 2021: >90 ML/MIN/1.73M2
EOSINOPHIL # BLD AUTO: 0.1 10E3/UL (ref 0–0.7)
EOSINOPHIL NFR BLD AUTO: 1 %
ERYTHROCYTE [DISTWIDTH] IN BLOOD BY AUTOMATED COUNT: 18.4 % (ref 10–15)
GLUCOSE SERPL-MCNC: 120 MG/DL (ref 70–99)
HCT VFR BLD AUTO: 35.3 % (ref 35–47)
HGB BLD-MCNC: 11.3 G/DL (ref 11.7–15.7)
IMM GRANULOCYTES # BLD: 0.2 10E3/UL
IMM GRANULOCYTES NFR BLD: 3 %
LDH SERPL L TO P-CCNC: 473 U/L (ref 0–250)
LYMPHOCYTES # BLD AUTO: 1.9 10E3/UL (ref 0.8–5.3)
LYMPHOCYTES NFR BLD AUTO: 30 %
MCH RBC QN AUTO: 31.4 PG (ref 26.5–33)
MCHC RBC AUTO-ENTMCNC: 32 G/DL (ref 31.5–36.5)
MCV RBC AUTO: 98 FL (ref 78–100)
MONOCYTES # BLD AUTO: 0.6 10E3/UL (ref 0–1.3)
MONOCYTES NFR BLD AUTO: 9 %
NEUTROPHILS # BLD AUTO: 3.7 10E3/UL (ref 1.6–8.3)
NEUTROPHILS NFR BLD AUTO: 56 %
NRBC # BLD AUTO: 0.2 10E3/UL
NRBC BLD AUTO-RTO: 3 /100
PLATELET # BLD AUTO: 524 10E3/UL (ref 150–450)
POTASSIUM SERPL-SCNC: 4.3 MMOL/L (ref 3.4–5.3)
PROT SERPL-MCNC: 7.3 G/DL (ref 6.4–8.3)
RBC # BLD AUTO: 3.6 10E6/UL (ref 3.8–5.2)
SODIUM SERPL-SCNC: 140 MMOL/L (ref 136–145)
WBC # BLD AUTO: 6.5 10E3/UL (ref 4–11)

## 2023-09-15 PROCEDURE — 83615 LACTATE (LD) (LDH) ENZYME: CPT

## 2023-09-15 PROCEDURE — 80053 COMPREHEN METABOLIC PANEL: CPT

## 2023-09-15 PROCEDURE — 36415 COLL VENOUS BLD VENIPUNCTURE: CPT

## 2023-09-15 PROCEDURE — 85004 AUTOMATED DIFF WBC COUNT: CPT

## 2023-09-15 NOTE — NURSING NOTE
Chief Complaint   Patient presents with    Labs Only     Venipuncture,      Evy Mathews RN on 9/15/2023 at 10:01 AM

## 2023-09-17 ENCOUNTER — HEALTH MAINTENANCE LETTER (OUTPATIENT)
Age: 63
End: 2023-09-17

## 2023-11-01 NOTE — PROGRESS NOTES
Spoke with Nancy and patient's daughter and , following new transplant visit with Dr. Rincon. Reviewed plan of care per NT conversation for Stem Cell Transplant. Explained role of the Nurse Coordinator throughout the BMT process as well as general time line and expectations for transplant. Discussed necessity of caregiver and program's proximity requirements. All questions were answered.     Plan: Allogeneic Transplant, pending identification of donor, readiness for transplant. Per Dr. Rincon, may not need transplant for many years to come.    Contact information provided for :  yes    HLA typing drawn: yes    PRA typing drawn:  yes    CMV-IgG and ABO-Rh drawn or in record:  yes    Contact information provided for :  yes    Financial Release for [facility] search obtained:  yes    [id number] Consent Signed: no    EOC Reason updated: yes     Yes

## 2023-12-14 ENCOUNTER — APPOINTMENT (OUTPATIENT)
Dept: LAB | Facility: CLINIC | Age: 63
End: 2023-12-14
Attending: INTERNAL MEDICINE
Payer: COMMERCIAL

## 2023-12-14 ENCOUNTER — ONCOLOGY VISIT (OUTPATIENT)
Dept: ONCOLOGY | Facility: CLINIC | Age: 63
End: 2023-12-14
Attending: INTERNAL MEDICINE
Payer: COMMERCIAL

## 2023-12-14 VITALS
OXYGEN SATURATION: 100 % | DIASTOLIC BLOOD PRESSURE: 81 MMHG | WEIGHT: 191 LBS | SYSTOLIC BLOOD PRESSURE: 151 MMHG | BODY MASS INDEX: 28.21 KG/M2 | HEART RATE: 89 BPM | RESPIRATION RATE: 16 BRPM | TEMPERATURE: 98.6 F

## 2023-12-14 DIAGNOSIS — D47.1 MPN (MYELOPROLIFERATIVE NEOPLASM) (H): ICD-10-CM

## 2023-12-14 DIAGNOSIS — K21.9 GASTROESOPHAGEAL REFLUX DISEASE WITHOUT ESOPHAGITIS: ICD-10-CM

## 2023-12-14 LAB
ALBUMIN SERPL BCG-MCNC: 4.2 G/DL (ref 3.5–5.2)
ALP SERPL-CCNC: 72 U/L (ref 40–150)
ALT SERPL W P-5'-P-CCNC: 33 U/L (ref 0–50)
ANION GAP SERPL CALCULATED.3IONS-SCNC: 9 MMOL/L (ref 7–15)
AST SERPL W P-5'-P-CCNC: 35 U/L (ref 0–45)
BASOPHILS # BLD AUTO: ABNORMAL 10*3/UL
BASOPHILS # BLD MANUAL: 0.1 10E3/UL (ref 0–0.2)
BASOPHILS NFR BLD AUTO: ABNORMAL %
BASOPHILS NFR BLD MANUAL: 1 %
BILIRUB SERPL-MCNC: 0.2 MG/DL
BUN SERPL-MCNC: 8 MG/DL (ref 8–23)
CALCIUM SERPL-MCNC: 9 MG/DL (ref 8.8–10.2)
CHLORIDE SERPL-SCNC: 105 MMOL/L (ref 98–107)
CREAT SERPL-MCNC: 0.45 MG/DL (ref 0.51–0.95)
DACRYOCYTES BLD QL SMEAR: ABNORMAL
DEPRECATED HCO3 PLAS-SCNC: 27 MMOL/L (ref 22–29)
EGFRCR SERPLBLD CKD-EPI 2021: >90 ML/MIN/1.73M2
EOSINOPHIL # BLD AUTO: ABNORMAL 10*3/UL
EOSINOPHIL # BLD MANUAL: 0.1 10E3/UL (ref 0–0.7)
EOSINOPHIL NFR BLD AUTO: ABNORMAL %
EOSINOPHIL NFR BLD MANUAL: 2 %
ERYTHROCYTE [DISTWIDTH] IN BLOOD BY AUTOMATED COUNT: 18 % (ref 10–15)
FRAGMENTS BLD QL SMEAR: ABNORMAL
GLUCOSE SERPL-MCNC: 128 MG/DL (ref 70–99)
HCT VFR BLD AUTO: 35.2 % (ref 35–47)
HGB BLD-MCNC: 10.9 G/DL (ref 11.7–15.7)
IMM GRANULOCYTES # BLD: ABNORMAL 10*3/UL
IMM GRANULOCYTES NFR BLD: ABNORMAL %
LDH SERPL L TO P-CCNC: 468 U/L (ref 0–250)
LYMPHOCYTES # BLD AUTO: ABNORMAL 10*3/UL
LYMPHOCYTES # BLD MANUAL: 1.5 10E3/UL (ref 0.8–5.3)
LYMPHOCYTES NFR BLD AUTO: ABNORMAL %
LYMPHOCYTES NFR BLD MANUAL: 24 %
MCH RBC QN AUTO: 30.5 PG (ref 26.5–33)
MCHC RBC AUTO-ENTMCNC: 31 G/DL (ref 31.5–36.5)
MCV RBC AUTO: 99 FL (ref 78–100)
MONOCYTES # BLD AUTO: ABNORMAL 10*3/UL
MONOCYTES # BLD MANUAL: 0.4 10E3/UL (ref 0–1.3)
MONOCYTES NFR BLD AUTO: ABNORMAL %
MONOCYTES NFR BLD MANUAL: 6 %
MYELOCYTES # BLD MANUAL: 0.1 10E3/UL
MYELOCYTES NFR BLD MANUAL: 2 %
NEUTROPHILS # BLD AUTO: ABNORMAL 10*3/UL
NEUTROPHILS # BLD MANUAL: 4.2 10E3/UL (ref 1.6–8.3)
NEUTROPHILS NFR BLD AUTO: ABNORMAL %
NEUTROPHILS NFR BLD MANUAL: 65 %
NRBC # BLD AUTO: 0.1 10E3/UL
NRBC # BLD AUTO: 0.2 10E3/UL
NRBC BLD AUTO-RTO: 2 /100
NRBC BLD MANUAL-RTO: 3 %
PLAT MORPH BLD: ABNORMAL
PLATELET # BLD AUTO: 513 10E3/UL (ref 150–450)
POLYCHROMASIA BLD QL SMEAR: SLIGHT
POTASSIUM SERPL-SCNC: 3.8 MMOL/L (ref 3.4–5.3)
PROT SERPL-MCNC: 7.2 G/DL (ref 6.4–8.3)
RBC # BLD AUTO: 3.57 10E6/UL (ref 3.8–5.2)
RBC MORPH BLD: ABNORMAL
SODIUM SERPL-SCNC: 141 MMOL/L (ref 135–145)
WBC # BLD AUTO: 6.4 10E3/UL (ref 4–11)

## 2023-12-14 PROCEDURE — 36415 COLL VENOUS BLD VENIPUNCTURE: CPT | Performed by: INTERNAL MEDICINE

## 2023-12-14 PROCEDURE — 99215 OFFICE O/P EST HI 40 MIN: CPT | Performed by: INTERNAL MEDICINE

## 2023-12-14 PROCEDURE — G0463 HOSPITAL OUTPT CLINIC VISIT: HCPCS | Performed by: INTERNAL MEDICINE

## 2023-12-14 PROCEDURE — 82947 ASSAY GLUCOSE BLOOD QUANT: CPT | Performed by: INTERNAL MEDICINE

## 2023-12-14 PROCEDURE — 85027 COMPLETE CBC AUTOMATED: CPT | Performed by: INTERNAL MEDICINE

## 2023-12-14 PROCEDURE — 83615 LACTATE (LD) (LDH) ENZYME: CPT | Performed by: INTERNAL MEDICINE

## 2023-12-14 PROCEDURE — 85007 BL SMEAR W/DIFF WBC COUNT: CPT | Performed by: INTERNAL MEDICINE

## 2023-12-14 RX ORDER — HYDROXYUREA 500 MG/1
500 CAPSULE ORAL DAILY
Qty: 90 CAPSULE | Refills: 3 | Status: SHIPPED | OUTPATIENT
Start: 2023-12-14 | End: 2024-06-20

## 2023-12-14 RX ORDER — OMEPRAZOLE 10 MG/1
CAPSULE, DELAYED RELEASE ORAL
Qty: 60 CAPSULE | Refills: 3 | Status: SHIPPED | OUTPATIENT
Start: 2023-12-14 | End: 2024-05-09

## 2023-12-14 RX ORDER — IBUPROFEN 600 MG/1
600 TABLET, FILM COATED ORAL EVERY 8 HOURS PRN
Qty: 60 TABLET | Refills: 0 | Status: SHIPPED | OUTPATIENT
Start: 2023-12-14

## 2023-12-14 RX ORDER — ASPIRIN 81 MG/1
81 TABLET ORAL DAILY
Qty: 90 TABLET | Refills: 3 | Status: SHIPPED | OUTPATIENT
Start: 2023-12-14

## 2023-12-14 ASSESSMENT — PAIN SCALES - GENERAL: PAINLEVEL: NO PAIN (0)

## 2023-12-14 NOTE — PROGRESS NOTES
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  CALR L367fs positive.  Low level JAK2 gene variant: p.V617F (1.6%)    Primary Physician: Tasha Nikc     History Of Present Illness:  Ms. Rodríguez is a 63 year old female who presents with thrombocytosis, anemia, left shifted WBC and blasts on smear.    I initially saw her on 9/28/2021.  Please see previous note for details.  I have copied and updated from prior note.    Looking back she has had thrombocytosis and anemia for a number of years.  On 8/20/2021, peripheral blood smear showed left shifted WBC with blast-like cells in addition to anemia and thrombocytosis.      She feels pain in the pelvic area and back pain. Sometimes feels leg are swollen. Right ankle is more swollen. It is not more swollen. This is going on for 4 months. She denies B symptoms. No abnormal bleeding, erythromelalgia, dyspnea. Denies significant dyspnea. Feels fatigued. No nausea, vomiting, diarrhea or constipation. Last mammogram was few years ago.      I did further work-up including bone marrow biopsy.    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  CALR L367fs positive.  Low level JAK2 gene variant: p.V617F (1.6%)  She has had thrombocytosis, anemia, left shifted WBC and has borderline splenomegaly.  LDH is 463.      She started taking Hydrea 500 mg daily in mid December 2021.       Interval history.  She comes in today accompanied by her daughter.  She does not want a professional [facility]  today.  She is doing well.  She is taking Hydrea 500 mg daily and baby aspirin daily.  She tells me that she is tolerating it well without any significant side effects.  Denies any nausea vomiting diarrhea constipation.  She does take omeprazole for GERD.  She occasionally takes ibuprofen.  No bleeding.  No  B symptoms.  No infections or shortness of breath.      ROS:  Rest of the comprehensive review of the system was unremarkable.    I reviewed other history in epic as below.      Past Medical/Surgical History:  Past Medical History:   Diagnosis Date    Cancer (H)     Diabetes (H)     Headache     Nonsenile cataract      Past Surgical History:   Procedure Laterality Date    ANKLE SURGERY      left     Allergies:  Allergies as of 12/14/2023    (No Known Allergies)     Current Medications:  Current Outpatient Medications   Medication Sig Dispense Refill    aspirin 81 MG EC tablet Take 1 tablet (81 mg) by mouth daily 90 tablet 3    atorvastatin (LIPITOR) 80 MG tablet Take 1 tablet by mouth daily      blood glucose (ACCU-CHEK FASTCLIX) lancing device 1 kit by Other route      blood glucose (NO BRAND SPECIFIED) test strip       blood glucose monitoring (ACCU-CHEK FASTCLIX) lancets       Calcium Carb-Cholecalciferol (CALCIUM 600 + D PO) Take 1 tablet by mouth daily      capsaicin (ZOSTRIX) 0.025 % external cream capsaicin 0.025 % topical cream   BALDEMAR 1 APPLICATION AA TID PRN P      cetirizine (ZYRTEC) 10 MG tablet Take 1 tablet by mouth daily at 2 pm      cholecalciferol (VITAMIN D3) 125 mcg (5000 units) capsule TAKE 1 CAPSULE BY MOUTH ONCE DAILY      cyclobenzaprine (FLEXERIL) 10 MG tablet cyclobenzaprine 10 mg tablet   TK 1 T PO TID PRF MUSCLE SPASM      DOCUSATE SODIUM PO Take 100 mg by mouth daily      erythromycin (ROMYCIN) 5 MG/GM ophthalmic ointment APPLY OINTMENT TO INCISION LINES THREE TIMES DAILY FOR 7 DAYS THEN EVERY AT BEDTIME FOR 7 DAYS      fluticasone (FLONASE) 50 MCG/ACT nasal spray SHAKE LIQUID AND USE 1 SPRAY IN EACH NOSTRIL EVERY DAY AS NEEDED      HYDROcodone-acetaminophen (NORCO) 5-325 MG tablet Take 1 tablet by mouth every 6 hours as needed      hydroxyurea (HYDREA) 500 MG capsule Take 1 capsule (500 mg) by mouth daily 90 capsule 3    ibuprofen (ADVIL/MOTRIN) 600 MG tablet       methocarbamol (ROBAXIN)  500 MG tablet TAKE 1 TABLET BY MOUTH TWICE DAILY AS NEEDED      omeprazole (PRILOSEC) 10 MG DR capsule TAKE 2 CAPSULES(20 MG) BY MOUTH DAILY 30 capsule 3    polyethylene glycol (MIRALAX) 17 GM/Dose powder Take 17 g by mouth daily as needed for constipation 510 g 1    propranolol ER (INDERAL LA) 160 MG 24 hr capsule       QUETIAPINE FUMARATE PO Take 50 mg by mouth      ACCU-CHEK GUIDE test strip  (Patient not taking: Reported on 3/16/2023)      ammonium lactate (LAC-HYDRIN) 12 % external lotion APPLY TOPICALLY TO THE AFFECTED AREA TWICE DAILY AS NEEDED (Patient not taking: Reported on 12/14/2023)      venlafaxine (EFFEXOR-XR) 75 MG 24 hr capsule venlafaxine ER 75 mg capsule,extended release 24 hr   TAKE ONE CAPSULE BY MOUTH ONCE DAILY (Patient not taking: Reported on 12/14/2023)        Family History:  Family History   Problem Relation Age of Onset    Glaucoma No family hx of     Macular Degeneration No family hx of      No family history of bleeding or clotting disorder or cancers.    Social History:  Social History     Socioeconomic History    Marital status: Single     Spouse name: Not on file    Number of children: Not on file    Years of education: Not on file    Highest education level: Not on file   Occupational History    Not on file   Tobacco Use    Smoking status: Never    Smokeless tobacco: Never   Substance and Sexual Activity    Alcohol use: No     Alcohol/week: 0.0 standard drinks of alcohol    Drug use: No    Sexual activity: Not Currently     Partners: Male   Other Topics Concern    Not on file   Social History Narrative    Not on file     Social Determinants of Health     Financial Resource Strain: Not on file   Food Insecurity: Not on file   Transportation Needs: Not on file   Physical Activity: Not on file   Stress: Not on file   Social Connections: Not on file   Interpersonal Safety: Not At Risk (10/28/2021)    Humiliation, Afraid, Rape, and Kick questionnaire     Fear of Current or Ex-Partner: No      Emotionally Abused: No     Physically Abused: No     Sexually Abused: No   Housing Stability: Not on file     Physical Exam:  BP (!) 151/81   Pulse 89   Temp 98.6  F (37  C)   Resp 16   Wt 86.6 kg (191 lb)   SpO2 100%   BMI 28.21 kg/m       Wt Readings from Last 4 Encounters:   12/14/23 86.6 kg (191 lb)   06/15/23 87.3 kg (192 lb 6.4 oz)   03/16/23 90.4 kg (199 lb 3.2 oz)   11/22/22 88.6 kg (195 lb 4.8 oz)     CONSTITUTIONAL: No apparent distress  EYES: PERRLA, without pallor or jaundice  ENT/MOUTH: Ears unremarkable. No oral lesions  CVS: s1s2 normal  RESPIRATORY: Chest is clear  GI: Abdomen is benign  NEURO: Alert and oriented ×3  INTEGUMENT: no concerning skin rashes   LYMPHATIC: no palpable lymphadenopathy  MUSCULOSKELETAL: Unremarkable. No bony tenderness.   EXTREMITIES: no pedal edema  PSYCH: Mentation, mood and affect are appropriate        Laboratory/Imaging Studies      Reviewed  12/14/2023  CBC shows WBC 6.4 Hg 10.9     CMP unremarkable      3/16/2023    Ferritin 21, iron 47, TIBC 318.  Iron saturation index 15%.  Erythropoietin was 98.    Folate and B12 was unremarkable.      2/9/2022  LDH was 451.  Iron studies were unremarkable.  B12 was normal.  Erythropoietin 82.1.    Ultrasound of the abdomen on 2/9/2022 shows mildly enlarged spleen at 14.3 cm.    11/12/2021   bone marrow biopsy shows myeloproliferative neoplasm.  There is normocellular marrow with trilineage hematopoiesis and increased and atypical megakaryocytes and 1% blasts.  Mild to moderate increase in marrow reticulin fibrosis (MF 1-2 of 3) with features suggestive of osteosclerosis.  This is most likely essential thrombocytosis with early myelofibrosis.  A distinction between primary myelofibrosis and eating with early myelofibrosis is difficult.    Concurrent flow cytometry ([id number]) shows polytypic B cells, a small unusual T cell population similar to that detected in a recent peripheral blood flow cytometry  study, no increase in myeloid blasts, and no abnormal myeloid blast population. There is no definitive morphologic or immunohistochemical correlate to the unusual T cells detected by flow cytometry, which may be present in the marrow or may reflect peripheral blood contamination.     Cytogenetics is 46XX.      Flow cytometry showed rare circulating CD34 positive myeloid blasts (1.3%), unusual CD8-positive CD57-positive T cells (1.6%) was present, and the B cells were polytypic.  The findings are not defintivie for a T cell lymphoproliferative disorder or a high grade myeloid neoplasm.    NGS panel shows CALR L367fs.  Low level JAK2 gene variant: p.V617F (1.6%) was again detected ( Off note in 2006, JAK2 mutation was detected by PCR. )        CT chest abdomen and pelvis on 10/22/2021 does not show any acute findings.  It showed borderline splenomegaly and nonspecific heterogenous sclerotic  appearance of the vertebrae and pelvic bones.    Bilateral lower extremity ultrasound on 10/22/2021 does not show evidence of DVT.          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  CALR L367fs positive.  Low level JAK2 gene variant: p.V617F (1.6%)  She has had thrombocytosis, anemia, left shifted WBC and has borderline splenomegaly.  LDH is 463.    She has been on Hydrea 500 mg since mid December 2021.  She is also taking aspirin.     Overall this is going well.  She feels good.    I would like that she continues the same dose of Hydrea.  Continue aspirin daily.  I refilled Hydrea and aspirin.    Check labs every 3 months.        Anemia.  She has mild anemia from Hydrea and disease.  Previous iron studies were normal.  Continue to observe      Previously she was also evaluated by  from bone marrow transplant department whom she saw on 12/29/2021.  He also agrees with current management of aspirin and hydroxyurea.    GERD.  She takes omeprazole.  I  refill that today.    We did not address the following today    Pelvic/back pain and extreme fatigue- overall better. CT CAP was essentially unremarkable except borderline splenomegaly     Leg swelling- R>L. US did not show any DVT- swelling has resolved.       We will check labs every 3 months and I will see her back in 6 months    All questions answered.  She is agreeable and comfortable with the plan.  Eddie Duong MD

## 2023-12-14 NOTE — LETTER
12/14/2023         RE: Nancy Rodríguez  [address]  [address]  [facility] [address]        Dear Colleague,    Thank you for referring your patient, Nancy Rodríguez, to the [facility]. Please see a copy of my visit note below.    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  CALR L367fs positive.  Low level JAK2 gene variant: p.V617F (1.6%)    Primary Physician: Tasha Nick     History Of Present Illness:  Ms. Rodríguez is a 63 year old female who presents with thrombocytosis, anemia, left shifted WBC and blasts on smear.    I initially saw her on 9/28/2021.  Please see previous note for details.  I have copied and updated from prior note.    Looking back she has had thrombocytosis and anemia for a number of years.  On 8/20/2021, peripheral blood smear showed left shifted WBC with blast-like cells in addition to anemia and thrombocytosis.      She feels pain in the pelvic area and back pain. Sometimes feels leg are swollen. Right ankle is more swollen. It is not more swollen. This is going on for 4 months. She denies B symptoms. No abnormal bleeding, erythromelalgia, dyspnea. Denies significant dyspnea. Feels fatigued. No nausea, vomiting, diarrhea or constipation. Last mammogram was few years ago.      I did further work-up including bone marrow biopsy.    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  CALR L367fs positive.  Low level JAK2 gene variant: p.V617F (1.6%)  She has had thrombocytosis, anemia, left shifted WBC and has borderline splenomegaly.  LDH is 463.      She started taking Hydrea 500 mg daily in mid December 2021.       Interval history.  She comes in today accompanied by her daughter.  She does not want a professional [place]ian  today.  She is doing well.  She is taking  Hydrea 500 mg daily and baby aspirin daily.  She tells me that she is tolerating it well without any significant side effects.  Denies any nausea vomiting diarrhea constipation.  She does take omeprazole for GERD.  She occasionally takes ibuprofen.  No bleeding.  No B symptoms.  No infections or shortness of breath.      ROS:  Rest of the comprehensive review of the system was unremarkable.    I reviewed other history in epic as below.      Past Medical/Surgical History:  Past Medical History:   Diagnosis Date    Cancer (H)     Diabetes (H)     Headache     Nonsenile cataract      Past Surgical History:   Procedure Laterality Date    ANKLE SURGERY      left     Allergies:  Allergies as of 12/14/2023    (No Known Allergies)     Current Medications:  Current Outpatient Medications   Medication Sig Dispense Refill    aspirin 81 MG EC tablet Take 1 tablet (81 mg) by mouth daily 90 tablet 3    atorvastatin (LIPITOR) 80 MG tablet Take 1 tablet by mouth daily      blood glucose (ACCU-CHEK FASTCLIX) lancing device 1 kit by Other route      blood glucose (NO BRAND SPECIFIED) test strip       blood glucose monitoring (ACCU-CHEK FASTCLIX) lancets       Calcium Carb-Cholecalciferol (CALCIUM 600 + D PO) Take 1 tablet by mouth daily      capsaicin (ZOSTRIX) 0.025 % external cream capsaicin 0.025 % topical cream   BALDEMAR 1 APPLICATION AA TID PRN P      cetirizine (ZYRTEC) 10 MG tablet Take 1 tablet by mouth daily at 2 pm      cholecalciferol (VITAMIN D3) 125 mcg (5000 units) capsule TAKE 1 CAPSULE BY MOUTH ONCE DAILY      cyclobenzaprine (FLEXERIL) 10 MG tablet cyclobenzaprine 10 mg tablet   TK 1 T PO TID PRF MUSCLE SPASM      DOCUSATE SODIUM PO Take 100 mg by mouth daily      erythromycin (ROMYCIN) 5 MG/GM ophthalmic ointment APPLY OINTMENT TO INCISION LINES THREE TIMES DAILY FOR 7 DAYS THEN EVERY AT BEDTIME FOR 7 DAYS      fluticasone (FLONASE) 50 MCG/ACT nasal spray SHAKE LIQUID AND USE 1 SPRAY IN EACH NOSTRIL EVERY DAY AS NEEDED       HYDROcodone-acetaminophen (NORCO) 5-325 MG tablet Take 1 tablet by mouth every 6 hours as needed      hydroxyurea (HYDREA) 500 MG capsule Take 1 capsule (500 mg) by mouth daily 90 capsule 3    ibuprofen (ADVIL/MOTRIN) 600 MG tablet       methocarbamol (ROBAXIN) 500 MG tablet TAKE 1 TABLET BY MOUTH TWICE DAILY AS NEEDED      omeprazole (PRILOSEC) 10 MG DR capsule TAKE 2 CAPSULES(20 MG) BY MOUTH DAILY 30 capsule 3    polyethylene glycol (MIRALAX) 17 GM/Dose powder Take 17 g by mouth daily as needed for constipation 510 g 1    propranolol ER (INDERAL LA) 160 MG 24 hr capsule       QUETIAPINE FUMARATE PO Take 50 mg by mouth      ACCU-CHEK GUIDE test strip  (Patient not taking: Reported on 3/16/2023)      ammonium lactate (LAC-HYDRIN) 12 % external lotion APPLY TOPICALLY TO THE AFFECTED AREA TWICE DAILY AS NEEDED (Patient not taking: Reported on 12/14/2023)      venlafaxine (EFFEXOR-XR) 75 MG 24 hr capsule venlafaxine ER 75 mg capsule,extended release 24 hr   TAKE ONE CAPSULE BY MOUTH ONCE DAILY (Patient not taking: Reported on 12/14/2023)        Family History:  Family History   Problem Relation Age of Onset    Glaucoma No family hx of     Macular Degeneration No family hx of      No family history of bleeding or clotting disorder or cancers.    Social History:  Social History     Socioeconomic History    Marital status: Single     Spouse name: Not on file    Number of children: Not on file    Years of education: Not on file    Highest education level: Not on file   Occupational History    Not on file   Tobacco Use    Smoking status: Never    Smokeless tobacco: Never   Substance and Sexual Activity    Alcohol use: No     Alcohol/week: 0.0 standard drinks of alcohol    Drug use: No    Sexual activity: Not Currently     Partners: Male   Other Topics Concern    Not on file   Social History Narrative    Not on file     Social Determinants of Health     Financial Resource Strain: Not on file   Food Insecurity: Not on  file   Transportation Needs: Not on file   Physical Activity: Not on file   Stress: Not on file   Social Connections: Not on file   Interpersonal Safety: Not At Risk (10/28/2021)    Humiliation, Afraid, Rape, and Kick questionnaire     Fear of Current or Ex-Partner: No     Emotionally Abused: No     Physically Abused: No     Sexually Abused: No   Housing Stability: Not on file     Physical Exam:  BP (!) 151/81   Pulse 89   Temp 98.6  F (37  C)   Resp 16   Wt 86.6 kg (191 lb)   SpO2 100%   BMI 28.21 kg/m       Wt Readings from Last 4 Encounters:   12/14/23 86.6 kg (191 lb)   06/15/23 87.3 kg (192 lb 6.4 oz)   03/16/23 90.4 kg (199 lb 3.2 oz)   11/22/22 88.6 kg (195 lb 4.8 oz)     CONSTITUTIONAL: No apparent distress  EYES: PERRLA, without pallor or jaundice  ENT/MOUTH: Ears unremarkable. No oral lesions  CVS: s1s2 normal  RESPIRATORY: Chest is clear  GI: Abdomen is benign  NEURO: Alert and oriented ×3  INTEGUMENT: no concerning skin rashes   LYMPHATIC: no palpable lymphadenopathy  MUSCULOSKELETAL: Unremarkable. No bony tenderness.   EXTREMITIES: no pedal edema  PSYCH: Mentation, mood and affect are appropriate        Laboratory/Imaging Studies      Reviewed  12/14/2023  CBC shows WBC 6.4 Hg 10.9     CMP unremarkable      3/16/2023    Ferritin 21, iron 47, TIBC 318.  Iron saturation index 15%.  Erythropoietin was 98.    Folate and B12 was unremarkable.      2/9/2022  LDH was 451.  Iron studies were unremarkable.  B12 was normal.  Erythropoietin 82.1.    Ultrasound of the abdomen on 2/9/2022 shows mildly enlarged spleen at 14.3 cm.    11/12/2021   bone marrow biopsy shows myeloproliferative neoplasm.  There is normocellular marrow with trilineage hematopoiesis and increased and atypical megakaryocytes and 1% blasts.  Mild to moderate increase in marrow reticulin fibrosis (MF 1-2 of 3) with features suggestive of osteosclerosis.  This is most likely essential thrombocytosis with early myelofibrosis.   A distinction between primary myelofibrosis and eating with early myelofibrosis is difficult.    Concurrent flow cytometry ([id number]) shows polytypic B cells, a small unusual T cell population similar to that detected in a recent peripheral blood flow cytometry study, no increase in myeloid blasts, and no abnormal myeloid blast population. There is no definitive morphologic or immunohistochemical correlate to the unusual T cells detected by flow cytometry, which may be present in the marrow or may reflect peripheral blood contamination.     Cytogenetics is 46XX.      Flow cytometry showed rare circulating CD34 positive myeloid blasts (1.3%), unusual CD8-positive CD57-positive T cells (1.6%) was present, and the B cells were polytypic.  The findings are not defintivie for a T cell lymphoproliferative disorder or a high grade myeloid neoplasm.    NGS panel shows CALR L367fs.  Low level JAK2 gene variant: p.V617F (1.6%) was again detected ( Off note in 2006, JAK2 mutation was detected by PCR. )        CT chest abdomen and pelvis on 10/22/2021 does not show any acute findings.  It showed borderline splenomegaly and nonspecific heterogenous sclerotic  appearance of the vertebrae and pelvic bones.    Bilateral lower extremity ultrasound on 10/22/2021 does not show evidence of DVT.          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  CALR L367fs positive.  Low level JAK2 gene variant: p.V617F (1.6%)  She has had thrombocytosis, anemia, left shifted WBC and has borderline splenomegaly.  LDH is 463.    She has been on Hydrea 500 mg since mid December 2021.  She is also taking aspirin.     Overall this is going well.  She feels good.    I would like that she continues the same dose of Hydrea.  Continue aspirin daily.  I refilled Hydrea and aspirin.    Check labs every 3 months.        Anemia.  She has mild anemia from Hydrea and disease.  Previous iron  studies were normal.  Continue to observe      Previously she was also evaluated by  from bone marrow transplant department whom she saw on 12/29/2021.  He also agrees with current management of aspirin and hydroxyurea.    GERD.  She takes omeprazole.  I refill that today.    We did not address the following today    Pelvic/back pain and extreme fatigue- overall better. CT CAP was essentially unremarkable except borderline splenomegaly     Leg swelling- R>L. US did not show any DVT- swelling has resolved.       We will check labs every 3 months and I will see her back in 6 months    All questions answered.  She is agreeable and comfortable with the plan.  Eddie Duong MD

## 2023-12-14 NOTE — NURSING NOTE
"Oncology Rooming Note    December 14, 2023 9:54 AM   Nancy Rodríguez is a 63 year old female who presents for:    Chief Complaint   Patient presents with    Labs Only     Venipuncture, vitals checked    Oncology Clinic Visit     MPN (myeloproliferative neoplasm)     Initial Vitals: BP (!) 151/81   Pulse 89   Temp 98.6  F (37  C)   Resp 16   Wt 86.6 kg (191 lb)   SpO2 100%   BMI 28.21 kg/m   Estimated body mass index is 28.21 kg/m  as calculated from the following:    Height as of 11/22/22: 1.753 m (5' 9\").    Weight as of this encounter: 86.6 kg (191 lb). Body surface area is 2.05 meters squared.  No Pain (0) Comment: Data Unavailable   No LMP recorded. Patient is postmenopausal.  Allergies reviewed: Yes  Medications reviewed: Yes    Medications: Pt requests Hydroxyurea. Omeprazole, Ibuprofen, and Asprin refill.   Pharmacy name entered into [facility]:    [facility] #[id number] - [place], [place] - [address] AT [facility] [address] [place] [id number]  [facility] #[id number] - [place], [place] - [address] AT [facility] [address] [facility] [address]  [facility] - [place], [place] - [address]    Clinical concerns: no other complaints        Kai Gallego"

## 2023-12-14 NOTE — NURSING NOTE
Chief Complaint   Patient presents with    Labs Only     Venipuncture, vitals checked     Evy Mathews RN on 12/14/2023 at 9:37 AM

## 2024-01-03 ENCOUNTER — LAB REQUISITION (OUTPATIENT)
Dept: LAB | Facility: CLINIC | Age: 64
End: 2024-01-03
Payer: COMMERCIAL

## 2024-01-03 DIAGNOSIS — E11.9 TYPE 2 DIABETES MELLITUS WITHOUT COMPLICATIONS (H): ICD-10-CM

## 2024-01-03 DIAGNOSIS — Z23 ENCOUNTER FOR IMMUNIZATION: ICD-10-CM

## 2024-01-03 LAB
CREAT UR-MCNC: 89.3 MG/DL
MICROALBUMIN UR-MCNC: 12.8 MG/L
MICROALBUMIN/CREAT UR: 14.33 MG/G CR (ref 0–25)

## 2024-01-03 PROCEDURE — 82570 ASSAY OF URINE CREATININE: CPT | Mod: ORL | Performed by: FAMILY MEDICINE

## 2024-01-04 ENCOUNTER — LAB REQUISITION (OUTPATIENT)
Dept: LAB | Facility: CLINIC | Age: 64
End: 2024-01-04
Payer: COMMERCIAL

## 2024-01-04 DIAGNOSIS — Z12.11 ENCOUNTER FOR SCREENING FOR MALIGNANT NEOPLASM OF COLON: ICD-10-CM

## 2024-01-04 LAB — HEMOCCULT STL QL IA: NEGATIVE

## 2024-01-04 PROCEDURE — 82274 ASSAY TEST FOR BLOOD FECAL: CPT | Mod: ORL | Performed by: FAMILY MEDICINE

## 2024-02-04 ENCOUNTER — HEALTH MAINTENANCE LETTER (OUTPATIENT)
Age: 64
End: 2024-02-04

## 2024-05-01 ENCOUNTER — LAB (OUTPATIENT)
Dept: LAB | Facility: CLINIC | Age: 64
End: 2024-05-01
Attending: INTERNAL MEDICINE
Payer: COMMERCIAL

## 2024-05-01 VITALS — BODY MASS INDEX: 26.43 KG/M2 | WEIGHT: 179 LBS

## 2024-05-01 DIAGNOSIS — D47.1 MPN (MYELOPROLIFERATIVE NEOPLASM) (H): ICD-10-CM

## 2024-05-01 LAB
ALBUMIN SERPL BCG-MCNC: 4.4 G/DL (ref 3.5–5.2)
ALP SERPL-CCNC: 77 U/L (ref 40–150)
ALT SERPL W P-5'-P-CCNC: 16 U/L (ref 0–50)
ANION GAP SERPL CALCULATED.3IONS-SCNC: 10 MMOL/L (ref 7–15)
AST SERPL W P-5'-P-CCNC: 21 U/L (ref 0–45)
BASOPHILS # BLD AUTO: ABNORMAL 10*3/UL
BASOPHILS # BLD MANUAL: 0 10E3/UL (ref 0–0.2)
BASOPHILS NFR BLD AUTO: ABNORMAL %
BASOPHILS NFR BLD MANUAL: 0 %
BILIRUB SERPL-MCNC: 0.3 MG/DL
BUN SERPL-MCNC: 13.2 MG/DL (ref 8–23)
CALCIUM SERPL-MCNC: 9.3 MG/DL (ref 8.8–10.2)
CHLORIDE SERPL-SCNC: 104 MMOL/L (ref 98–107)
CREAT SERPL-MCNC: 0.52 MG/DL (ref 0.51–0.95)
DEPRECATED HCO3 PLAS-SCNC: 26 MMOL/L (ref 22–29)
EGFRCR SERPLBLD CKD-EPI 2021: >90 ML/MIN/1.73M2
EOSINOPHIL # BLD AUTO: ABNORMAL 10*3/UL
EOSINOPHIL # BLD MANUAL: 0.1 10E3/UL (ref 0–0.7)
EOSINOPHIL NFR BLD AUTO: ABNORMAL %
EOSINOPHIL NFR BLD MANUAL: 1 %
ERYTHROCYTE [DISTWIDTH] IN BLOOD BY AUTOMATED COUNT: 18.8 % (ref 10–15)
GLUCOSE SERPL-MCNC: 101 MG/DL (ref 70–99)
HCT VFR BLD AUTO: 35.6 % (ref 35–47)
HGB BLD-MCNC: 10.9 G/DL (ref 11.7–15.7)
IMM GRANULOCYTES # BLD: ABNORMAL 10*3/UL
IMM GRANULOCYTES NFR BLD: ABNORMAL %
LDH SERPL L TO P-CCNC: 501 U/L (ref 0–250)
LYMPHOCYTES # BLD AUTO: ABNORMAL 10*3/UL
LYMPHOCYTES # BLD MANUAL: 2.5 10E3/UL (ref 0.8–5.3)
LYMPHOCYTES NFR BLD AUTO: ABNORMAL %
LYMPHOCYTES NFR BLD MANUAL: 37 %
MCH RBC QN AUTO: 27.9 PG (ref 26.5–33)
MCHC RBC AUTO-ENTMCNC: 30.6 G/DL (ref 31.5–36.5)
MCV RBC AUTO: 91 FL (ref 78–100)
METAMYELOCYTES # BLD MANUAL: 0.1 10E3/UL
METAMYELOCYTES NFR BLD MANUAL: 2 %
MONOCYTES # BLD AUTO: ABNORMAL 10*3/UL
MONOCYTES # BLD MANUAL: 0.1 10E3/UL (ref 0–1.3)
MONOCYTES NFR BLD AUTO: ABNORMAL %
MONOCYTES NFR BLD MANUAL: 1 %
MYELOCYTES # BLD MANUAL: 0.1 10E3/UL
MYELOCYTES NFR BLD MANUAL: 2 %
NEUTROPHILS # BLD AUTO: ABNORMAL 10*3/UL
NEUTROPHILS # BLD MANUAL: 3.9 10E3/UL (ref 1.6–8.3)
NEUTROPHILS NFR BLD AUTO: ABNORMAL %
NEUTROPHILS NFR BLD MANUAL: 57 %
NRBC # BLD AUTO: 0.3 10E3/UL
NRBC # BLD AUTO: 0.3 10E3/UL
NRBC BLD AUTO-RTO: 4 /100
NRBC BLD MANUAL-RTO: 5 %
PLAT MORPH BLD: ABNORMAL
PLATELET # BLD AUTO: 511 10E3/UL (ref 150–450)
POTASSIUM SERPL-SCNC: 3.9 MMOL/L (ref 3.4–5.3)
PROT SERPL-MCNC: 7.4 G/DL (ref 6.4–8.3)
RBC # BLD AUTO: 3.91 10E6/UL (ref 3.8–5.2)
RBC MORPH BLD: ABNORMAL
SODIUM SERPL-SCNC: 140 MMOL/L (ref 135–145)
WBC # BLD AUTO: 6.8 10E3/UL (ref 4–11)

## 2024-05-01 PROCEDURE — 85007 BL SMEAR W/DIFF WBC COUNT: CPT

## 2024-05-01 PROCEDURE — 83615 LACTATE (LD) (LDH) ENZYME: CPT

## 2024-05-01 PROCEDURE — 80053 COMPREHEN METABOLIC PANEL: CPT

## 2024-05-01 PROCEDURE — 36415 COLL VENOUS BLD VENIPUNCTURE: CPT

## 2024-05-01 PROCEDURE — 85027 COMPLETE CBC AUTOMATED: CPT

## 2024-05-01 NOTE — NURSING NOTE
Chief Complaint   Patient presents with    Blood Draw     Labs drawn via  by RN in lab.     Labs collected from venipuncture by RN.     Mini Mendez RN

## 2024-05-08 DIAGNOSIS — K21.9 GASTROESOPHAGEAL REFLUX DISEASE WITHOUT ESOPHAGITIS: ICD-10-CM

## 2024-05-09 RX ORDER — OMEPRAZOLE 10 MG/1
CAPSULE, DELAYED RELEASE ORAL
Qty: 60 CAPSULE | Refills: 3 | Status: SHIPPED | OUTPATIENT
Start: 2024-05-09 | End: 2024-08-27

## 2024-06-20 ENCOUNTER — ONCOLOGY VISIT (OUTPATIENT)
Dept: ONCOLOGY | Facility: CLINIC | Age: 64
End: 2024-06-20
Attending: INTERNAL MEDICINE
Payer: COMMERCIAL

## 2024-06-20 ENCOUNTER — APPOINTMENT (OUTPATIENT)
Dept: LAB | Facility: CLINIC | Age: 64
End: 2024-06-20
Attending: INTERNAL MEDICINE
Payer: COMMERCIAL

## 2024-06-20 VITALS
TEMPERATURE: 97.7 F | SYSTOLIC BLOOD PRESSURE: 143 MMHG | DIASTOLIC BLOOD PRESSURE: 82 MMHG | RESPIRATION RATE: 16 BRPM | BODY MASS INDEX: 27.02 KG/M2 | WEIGHT: 183 LBS | HEART RATE: 65 BPM | OXYGEN SATURATION: 98 %

## 2024-06-20 DIAGNOSIS — D47.1 MPN (MYELOPROLIFERATIVE NEOPLASM) (H): ICD-10-CM

## 2024-06-20 LAB
ALBUMIN SERPL BCG-MCNC: 4.2 G/DL (ref 3.5–5.2)
ALP SERPL-CCNC: 67 U/L (ref 40–150)
ALT SERPL W P-5'-P-CCNC: 22 U/L (ref 0–50)
ANION GAP SERPL CALCULATED.3IONS-SCNC: 9 MMOL/L (ref 7–15)
AST SERPL W P-5'-P-CCNC: 30 U/L (ref 0–45)
BASOPHILS # BLD AUTO: 0.1 10E3/UL (ref 0–0.2)
BASOPHILS NFR BLD AUTO: 1 %
BILIRUB SERPL-MCNC: 0.2 MG/DL
BUN SERPL-MCNC: 5.7 MG/DL (ref 8–23)
CALCIUM SERPL-MCNC: 9.2 MG/DL (ref 8.8–10.2)
CHLORIDE SERPL-SCNC: 105 MMOL/L (ref 98–107)
CREAT SERPL-MCNC: 0.46 MG/DL (ref 0.51–0.95)
DEPRECATED HCO3 PLAS-SCNC: 27 MMOL/L (ref 22–29)
EGFRCR SERPLBLD CKD-EPI 2021: >90 ML/MIN/1.73M2
EOSINOPHIL # BLD AUTO: 0.1 10E3/UL (ref 0–0.7)
EOSINOPHIL NFR BLD AUTO: 1 %
ERYTHROCYTE [DISTWIDTH] IN BLOOD BY AUTOMATED COUNT: 21.4 % (ref 10–15)
GLUCOSE SERPL-MCNC: 124 MG/DL (ref 70–99)
HCT VFR BLD AUTO: 32.1 % (ref 35–47)
HGB BLD-MCNC: 10 G/DL (ref 11.7–15.7)
IMM GRANULOCYTES # BLD: 0.2 10E3/UL
IMM GRANULOCYTES NFR BLD: 3 %
LDH SERPL L TO P-CCNC: 504 U/L (ref 0–250)
LYMPHOCYTES # BLD AUTO: 1.8 10E3/UL (ref 0.8–5.3)
LYMPHOCYTES NFR BLD AUTO: 31 %
MCH RBC QN AUTO: 28.9 PG (ref 26.5–33)
MCHC RBC AUTO-ENTMCNC: 31.2 G/DL (ref 31.5–36.5)
MCV RBC AUTO: 93 FL (ref 78–100)
MONOCYTES # BLD AUTO: 0.6 10E3/UL (ref 0–1.3)
MONOCYTES NFR BLD AUTO: 10 %
NEUTROPHILS # BLD AUTO: 3.2 10E3/UL (ref 1.6–8.3)
NEUTROPHILS NFR BLD AUTO: 54 %
NRBC # BLD AUTO: 0.3 10E3/UL
NRBC BLD AUTO-RTO: 4 /100
PLATELET # BLD AUTO: 563 10E3/UL (ref 150–450)
POTASSIUM SERPL-SCNC: 4.2 MMOL/L (ref 3.4–5.3)
PROT SERPL-MCNC: 6.9 G/DL (ref 6.4–8.3)
RBC # BLD AUTO: 3.46 10E6/UL (ref 3.8–5.2)
SODIUM SERPL-SCNC: 141 MMOL/L (ref 135–145)
WBC # BLD AUTO: 5.8 10E3/UL (ref 4–11)

## 2024-06-20 PROCEDURE — G0463 HOSPITAL OUTPT CLINIC VISIT: HCPCS | Performed by: INTERNAL MEDICINE

## 2024-06-20 PROCEDURE — G2211 COMPLEX E/M VISIT ADD ON: HCPCS | Performed by: INTERNAL MEDICINE

## 2024-06-20 PROCEDURE — 36415 COLL VENOUS BLD VENIPUNCTURE: CPT | Performed by: INTERNAL MEDICINE

## 2024-06-20 PROCEDURE — 82247 BILIRUBIN TOTAL: CPT | Performed by: INTERNAL MEDICINE

## 2024-06-20 PROCEDURE — 99214 OFFICE O/P EST MOD 30 MIN: CPT | Performed by: INTERNAL MEDICINE

## 2024-06-20 PROCEDURE — 83615 LACTATE (LD) (LDH) ENZYME: CPT | Performed by: INTERNAL MEDICINE

## 2024-06-20 PROCEDURE — 85025 COMPLETE CBC W/AUTO DIFF WBC: CPT | Performed by: INTERNAL MEDICINE

## 2024-06-20 RX ORDER — HYDROXYUREA 500 MG/1
500 CAPSULE ORAL DAILY
Qty: 90 CAPSULE | Refills: 3 | Status: SHIPPED | OUTPATIENT
Start: 2024-06-20

## 2024-06-20 RX ORDER — FERROUS SULFATE 325(65) MG
TABLET ORAL
COMMUNITY
Start: 2024-06-07

## 2024-06-20 RX ORDER — ACETAMINOPHEN 500 MG
TABLET ORAL
COMMUNITY

## 2024-06-20 ASSESSMENT — PAIN SCALES - GENERAL: PAINLEVEL: NO PAIN (0)

## 2024-06-20 NOTE — NURSING NOTE
"Oncology Rooming Note    June 20, 2024 8:57 AM   Nancy Rodríguez is a 64 year old female who presents for:    Chief Complaint   Patient presents with    Blood Draw     Labs collected from venipuncture by RN. Vitals taken. Checked in for appointment(s).     Oncology Clinic Visit     Myeloproliferative Neoplasm     Initial Vitals: BP (!) 143/82   Pulse 65   Temp 97.7  F (36.5  C)   Resp 16   Wt 83 kg (183 lb)   SpO2 98%   BMI 27.02 kg/m   Estimated body mass index is 27.02 kg/m  as calculated from the following:    Height as of 11/22/22: 1.753 m (5' 9\").    Weight as of this encounter: 83 kg (183 lb). Body surface area is 2.01 meters squared.  No Pain (0) Comment: Data Unavailable   No LMP recorded. Patient is postmenopausal.  Allergies reviewed: Yes  Medications reviewed: Yes    Medications: Refills needed.  Pharmacy name entered into [facility]:    [facility] #[id number] - [place], [place] - [address] AT [facility] [address]  [id number] - [place] - [address] [facility] [address] [facility] [address]  [facility] - [place], [place] - [address] [facility] [address]    Frailty Screening:   Is the patient here for a new oncology consult visit in cancer care? 2. No      Clinical concerns: Refill: Hydroxyurea      Dunia Anna, EMT  6/20/2024              "

## 2024-06-20 NOTE — LETTER
6/20/2024      Nancy Rodríguez  [address]  [address]  [facility] [address]      Dear Colleague,    Thank you for referring your patient, Nancy Rodríguez, to the [facility]. Please see a copy of my visit note below.    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  CALR L367fs positive.  Low level JAK2 gene variant: p.V617F (1.6%)    Primary Physician: Tasha Nick     History Of Present Illness:  Ms. Rodríguez is a 64 year old female who presents with thrombocytosis, anemia, left shifted WBC and blasts on smear.    I initially saw her on 9/28/2021.  Please see previous note for details.  I have copied and updated from prior note.    Looking back she has had thrombocytosis and anemia for a number of years.  On 8/20/2021, peripheral blood smear showed left shifted WBC with blast-like cells in addition to anemia and thrombocytosis.      She feels pain in the pelvic area and back pain. Sometimes feels leg are swollen. Right ankle is more swollen. It is not more swollen. This is going on for 4 months. She denies B symptoms. No abnormal bleeding, erythromelalgia, dyspnea. Denies significant dyspnea. Feels fatigued. No nausea, vomiting, diarrhea or constipation. Last mammogram was few years ago.      I did further work-up including bone marrow biopsy.    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  CALR L367fs positive.  Low level JAK2 gene variant: p.V617F (1.6%)  She has had thrombocytosis, anemia, left shifted WBC and has borderline splenomegaly.  LDH is 463.      She started taking Hydrea 500 mg daily in mid December 2021.       Interval history.  She comes in today accompanied by her daughter.  She does not want a professional [place] Islander  today as daughter helps with the translation.  She has been  taking Hydrea 500 mg once daily and baby aspirin once daily and continues to feel well.  She denies any B symptoms.  No erythromelalgia.  No infections.  No shortness of breath.  No GI problems.      Rest of the comprehensive review of the system was unremarkable.    I reviewed other history in epic as below.      Past Medical/Surgical History:  Past Medical History:   Diagnosis Date     Cancer (H)      Diabetes (H)      Headache      Nonsenile cataract      Past Surgical History:   Procedure Laterality Date     ANKLE SURGERY      left     Allergies:  Allergies as of 06/20/2024     (No Known Allergies)     Current Medications:  Current Outpatient Medications   Medication Sig Dispense Refill     acetaminophen (TYLENOL) 500 MG tablet TAKE 1 TABLET(500 MG) BY MOUTH EVERY 4 HOURS AS NEEDED       aspirin 81 MG EC tablet Take 1 tablet (81 mg) by mouth daily 90 tablet 3     atorvastatin (LIPITOR) 80 MG tablet Take 1 tablet by mouth daily       blood glucose (ACCU-CHEK FASTCLIX) lancing device 1 kit by Other route       blood glucose (NO BRAND SPECIFIED) test strip        blood glucose monitoring (ACCU-CHEK FASTCLIX) lancets        Calcium Carb-Cholecalciferol (CALCIUM 600 + D PO) Take 1 tablet by mouth daily       capsaicin (ZOSTRIX) 0.025 % external cream capsaicin 0.025 % topical cream   BALDEMAR 1 APPLICATION AA TID PRN P       cetirizine (ZYRTEC) 10 MG tablet Take 1 tablet by mouth daily at 2 pm       cholecalciferol (VITAMIN D3) 125 mcg (5000 units) capsule TAKE 1 CAPSULE BY MOUTH ONCE DAILY       cyclobenzaprine (FLEXERIL) 10 MG tablet cyclobenzaprine 10 mg tablet   TK 1 T PO TID PRF MUSCLE SPASM       DOCUSATE SODIUM PO Take 100 mg by mouth daily       erythromycin (ROMYCIN) 5 MG/GM ophthalmic ointment APPLY OINTMENT TO INCISION LINES THREE TIMES DAILY FOR 7 DAYS THEN EVERY AT BEDTIME FOR 7 DAYS       ferrous sulfate (FEROSUL) 325 (65 Fe) MG tablet        fluticasone (FLONASE) 50 MCG/ACT nasal spray SHAKE LIQUID AND USE  1 SPRAY IN EACH NOSTRIL EVERY DAY AS NEEDED       HYDROcodone-acetaminophen (NORCO) 5-325 MG tablet Take 1 tablet by mouth every 6 hours as needed       hydroxyurea (HYDREA) 500 MG capsule Take 1 capsule (500 mg) by mouth daily 90 capsule 3     ibuprofen (ADVIL/MOTRIN) 600 MG tablet Take 1 tablet (600 mg) by mouth every 8 hours as needed for moderate pain 60 tablet 0     methocarbamol (ROBAXIN) 500 MG tablet TAKE 1 TABLET BY MOUTH TWICE DAILY AS NEEDED       omeprazole (PRILOSEC) 10 MG DR capsule TAKE TWO (2) CAPSULES(20 MG) BY MOUTH ONCE DAILY 60 capsule 3     polyethylene glycol (MIRALAX) 17 GM/Dose powder Take 17 g by mouth daily as needed for constipation 510 g 1     propranolol ER (INDERAL LA) 160 MG 24 hr capsule        QUETIAPINE FUMARATE PO Take 50 mg by mouth       ACCU-CHEK GUIDE test strip  (Patient not taking: Reported on 3/16/2023)       ammonium lactate (LAC-HYDRIN) 12 % external lotion APPLY TOPICALLY TO THE AFFECTED AREA TWICE DAILY AS NEEDED (Patient not taking: Reported on 12/14/2023)       venlafaxine (EFFEXOR-XR) 75 MG 24 hr capsule venlafaxine ER 75 mg capsule,extended release 24 hr   TAKE ONE CAPSULE BY MOUTH ONCE DAILY (Patient not taking: Reported on 12/14/2023)        Family History:  Family History   Problem Relation Age of Onset     Glaucoma No family hx of      Macular Degeneration No family hx of      No family history of bleeding or clotting disorder or cancers.    Social History:  Social History     Socioeconomic History     Marital status: Single     Spouse name: Not on file     Number of children: Not on file     Years of education: Not on file     Highest education level: Not on file   Occupational History     Not on file   Tobacco Use     Smoking status: Never     Smokeless tobacco: Never   Substance and Sexual Activity     Alcohol use: No     Alcohol/week: 0.0 standard drinks of alcohol     Drug use: No     Sexual activity: Not Currently     Partners: Male   Other Topics Concern      Not on file   Social History Narrative     Not on file     Social Determinants of Health     Financial Resource Strain: Not on File (2019)    Received from [facility]     Financial Resource Strain      Financial Resource Strain: 0   Food Insecurity: Not on File (2019)    Received from [facility]     Food Insecurity      Food: 0   Transportation Needs: Not on File (2019)    Received from [facility]     Transportation Needs      Transportation: 0   Physical Activity: Not on File (2019)    Received from [facility]     Physical Activity      Physical Activity: 0   Stress: Not on File (2019)    Received from [facility]     Stress      Stress: 0   Social Connections: Not on File (2019)    Received from [facility]     Social Connections      Social Connections and Isolation: 0   Interpersonal Safety: Not At Risk (10/28/2021)    Humiliation, Afraid, Rape, and Kick questionnaire      Fear of Current or Ex-Partner: No      Emotionally Abused: No      Physically Abused: No      Sexually Abused: No   Housing Stability: Not on File (2019)    Received from [facility]     Housing Stability      Housin     Physical Exam:  BP (!) 143/82   Pulse 65   Temp 97.7  F (36.5  C)   Resp 16   Wt 83 kg (183 lb)   SpO2 98%   BMI 27.02 kg/m       Wt Readings from Last 4 Encounters:   24 83 kg (183 lb)   24 81.2 kg (179 lb)   23 86.6 kg (191 lb)   06/15/23 87.3 kg (192 lb 6.4 oz)     CONSTITUTIONAL: no acute distress  EYES: PERRLA, mild pallor but no icterus.  ENT/MOUTH: no mouth lesions. Ears normal  CVS: s1s2 no m r g .   RESPIRATORY: clear to auscultation b/l  GI: soft non tender no hepatosplenomegaly  NEURO: AAOX3  Grossly non focal neuro exam  INTEGUMENT: no obvious rashes  LYMPHATIC: no palpable cervical, supraclavicular, axillary or inguinal LAD  MUSCULOSKELETAL: Unremarkable. No bony tenderness.   EXTREMITIES: no edema  PSYCH: Mentation, mood and  affect are normal. Decision making capacity is intact      Laboratory/Imaging Studies      Reviewed  6/20/2024     CBC showed WBC 5.8.  Hemoglobin 10.  Platelets 563.  CMP unremarkable.    .      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  CALR L367fs positive.  Low level JAK2 gene variant: p.V617F (1.6%)  She has had thrombocytosis, anemia, left shifted WBC and has borderline splenomegaly.  LDH is 463.    Started Hydrea in December 2021.  She has been on 500 mg once daily.    Overall this is going well.  She feels good.  She is mildly more anemic and platelet count is 563 which is slightly higher than last time.  Otherwise labs are stable.  I would recommend continuing the same dose of Hydrea 500 mg daily for now and monitor labs every 3 months.  Continue baby aspirin.    Previously she was also evaluated by  from bone marrow transplant department whom she saw on 12/29/2021.       GERD.  Continue omeprazole.    We did not address the following today    Pelvic/back pain and extreme fatigue- overall better. CT CAP was essentially unremarkable except borderline splenomegaly     Leg swelling- R>L. US did not show any DVT- swelling has resolved.       I will see her back in 6 months.    All questions answered.  She is agreeable and comfortable with the plan.  Eddie Duong MD    The longitudinal plan of care for the myeloproliferative neoplasm as documented were addressed during this visit. Due to the added complexity in care, I will continue to support Nancy in the subsequent management and with ongoing continuity of care.    Again, thank you for allowing me to participate in the care of your patient.        Sincerely,        Eddie Duong MD

## 2024-06-20 NOTE — PROGRESS NOTES
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  CALR L367fs positive.  Low level JAK2 gene variant: p.V617F (1.6%)    Primary Physician: Tasha Nick     History Of Present Illness:  Ms. Rodríguez is a 64 year old female who presents with thrombocytosis, anemia, left shifted WBC and blasts on smear.    I initially saw her on 9/28/2021.  Please see previous note for details.  I have copied and updated from prior note.    Looking back she has had thrombocytosis and anemia for a number of years.  On 8/20/2021, peripheral blood smear showed left shifted WBC with blast-like cells in addition to anemia and thrombocytosis.      She feels pain in the pelvic area and back pain. Sometimes feels leg are swollen. Right ankle is more swollen. It is not more swollen. This is going on for 4 months. She denies B symptoms. No abnormal bleeding, erythromelalgia, dyspnea. Denies significant dyspnea. Feels fatigued. No nausea, vomiting, diarrhea or constipation. Last mammogram was few years ago.      I did further work-up including bone marrow biopsy.    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  CALR L367fs positive.  Low level JAK2 gene variant: p.V617F (1.6%)  She has had thrombocytosis, anemia, left shifted WBC and has borderline splenomegaly.  LDH is 463.      She started taking Hydrea 500 mg daily in mid December 2021.       Interval history.  She comes in today accompanied by her daughter.  She does not want a professional [facility]  today as daughter helps with the translation.  She has been taking Hydrea 500 mg once daily and baby aspirin once daily and continues to feel well.  She denies any B symptoms.  No erythromelalgia.  No infections.  No shortness of breath.  No GI problems.      Rest of the comprehensive review of the system was  unremarkable.    I reviewed other history in epic as below.      Past Medical/Surgical History:  Past Medical History:   Diagnosis Date    Cancer (H)     Diabetes (H)     Headache     Nonsenile cataract      Past Surgical History:   Procedure Laterality Date    ANKLE SURGERY      left     Allergies:  Allergies as of 06/20/2024    (No Known Allergies)     Current Medications:  Current Outpatient Medications   Medication Sig Dispense Refill    acetaminophen (TYLENOL) 500 MG tablet TAKE 1 TABLET(500 MG) BY MOUTH EVERY 4 HOURS AS NEEDED      aspirin 81 MG EC tablet Take 1 tablet (81 mg) by mouth daily 90 tablet 3    atorvastatin (LIPITOR) 80 MG tablet Take 1 tablet by mouth daily      blood glucose (ACCU-CHEK FASTCLIX) lancing device 1 kit by Other route      blood glucose (NO BRAND SPECIFIED) test strip       blood glucose monitoring (ACCU-CHEK FASTCLIX) lancets       Calcium Carb-Cholecalciferol (CALCIUM 600 + D PO) Take 1 tablet by mouth daily      capsaicin (ZOSTRIX) 0.025 % external cream capsaicin 0.025 % topical cream   BALDEMAR 1 APPLICATION AA TID PRN P      cetirizine (ZYRTEC) 10 MG tablet Take 1 tablet by mouth daily at 2 pm      cholecalciferol (VITAMIN D3) 125 mcg (5000 units) capsule TAKE 1 CAPSULE BY MOUTH ONCE DAILY      cyclobenzaprine (FLEXERIL) 10 MG tablet cyclobenzaprine 10 mg tablet   TK 1 T PO TID PRF MUSCLE SPASM      DOCUSATE SODIUM PO Take 100 mg by mouth daily      erythromycin (ROMYCIN) 5 MG/GM ophthalmic ointment APPLY OINTMENT TO INCISION LINES THREE TIMES DAILY FOR 7 DAYS THEN EVERY AT BEDTIME FOR 7 DAYS      ferrous sulfate (FEROSUL) 325 (65 Fe) MG tablet       fluticasone (FLONASE) 50 MCG/ACT nasal spray SHAKE LIQUID AND USE 1 SPRAY IN EACH NOSTRIL EVERY DAY AS NEEDED      HYDROcodone-acetaminophen (NORCO) 5-325 MG tablet Take 1 tablet by mouth every 6 hours as needed      hydroxyurea (HYDREA) 500 MG capsule Take 1 capsule (500 mg) by mouth daily 90 capsule 3    ibuprofen (ADVIL/MOTRIN) 600  MG tablet Take 1 tablet (600 mg) by mouth every 8 hours as needed for moderate pain 60 tablet 0    methocarbamol (ROBAXIN) 500 MG tablet TAKE 1 TABLET BY MOUTH TWICE DAILY AS NEEDED      omeprazole (PRILOSEC) 10 MG DR capsule TAKE TWO (2) CAPSULES(20 MG) BY MOUTH ONCE DAILY 60 capsule 3    polyethylene glycol (MIRALAX) 17 GM/Dose powder Take 17 g by mouth daily as needed for constipation 510 g 1    propranolol ER (INDERAL LA) 160 MG 24 hr capsule       QUETIAPINE FUMARATE PO Take 50 mg by mouth      ACCU-CHEK GUIDE test strip  (Patient not taking: Reported on 3/16/2023)      ammonium lactate (LAC-HYDRIN) 12 % external lotion APPLY TOPICALLY TO THE AFFECTED AREA TWICE DAILY AS NEEDED (Patient not taking: Reported on 12/14/2023)      venlafaxine (EFFEXOR-XR) 75 MG 24 hr capsule venlafaxine ER 75 mg capsule,extended release 24 hr   TAKE ONE CAPSULE BY MOUTH ONCE DAILY (Patient not taking: Reported on 12/14/2023)        Family History:  Family History   Problem Relation Age of Onset    Glaucoma No family hx of     Macular Degeneration No family hx of      No family history of bleeding or clotting disorder or cancers.    Social History:  Social History     Socioeconomic History    Marital status: Single     Spouse name: Not on file    Number of children: Not on file    Years of education: Not on file    Highest education level: Not on file   Occupational History    Not on file   Tobacco Use    Smoking status: Never    Smokeless tobacco: Never   Substance and Sexual Activity    Alcohol use: No     Alcohol/week: 0.0 standard drinks of alcohol    Drug use: No    Sexual activity: Not Currently     Partners: Male   Other Topics Concern    Not on file   Social History Narrative    Not on file     Social Determinants of Health     Financial Resource Strain: Not on File (11/8/2019)    Received from [employer]     Financial Resource Strain     Financial Resource Strain: 0   Food Insecurity: Not on File (11/8/2019)    Received  from [employer]     Food Insecurity     Food: 0   Transportation Needs: Not on File (2019)    Received from [employer]     Transportation Needs     Transportation: 0   Physical Activity: Not on File (2019)    Received from [employer]     Physical Activity     Physical Activity: 0   Stress: Not on File (2019)    Received from [employer]     Stress     Stress: 0   Social Connections: Not on File (2019)    Received from [employer]     Social Connections     Social Connections and Isolation: 0   Interpersonal Safety: Not At Risk (10/28/2021)    Humiliation, Afraid, Rape, and Kick questionnaire     Fear of Current or Ex-Partner: No     Emotionally Abused: No     Physically Abused: No     Sexually Abused: No   Housing Stability: Not on File (2019)    Received from [employer]     Housing Stability     Housin     Physical Exam:  BP (!) 143/82   Pulse 65   Temp 97.7  F (36.5  C)   Resp 16   Wt 83 kg (183 lb)   SpO2 98%   BMI 27.02 kg/m       Wt Readings from Last 4 Encounters:   24 83 kg (183 lb)   24 81.2 kg (179 lb)   23 86.6 kg (191 lb)   06/15/23 87.3 kg (192 lb 6.4 oz)     CONSTITUTIONAL: no acute distress  EYES: PERRLA, mild pallor but no icterus.  ENT/MOUTH: no mouth lesions. Ears normal  CVS: s1s2 no m r g .   RESPIRATORY: clear to auscultation b/l  GI: soft non tender no hepatosplenomegaly  NEURO: AAOX3  Grossly non focal neuro exam  INTEGUMENT: no obvious rashes  LYMPHATIC: no palpable cervical, supraclavicular, axillary or inguinal LAD  MUSCULOSKELETAL: Unremarkable. No bony tenderness.   EXTREMITIES: no edema  PSYCH: Mentation, mood and affect are normal. Decision making capacity is intact      Laboratory/Imaging Studies      Reviewed  2024     CBC showed WBC 5.8.  Hemoglobin 10.  Platelets 563.  CMP unremarkable.    .      ASSESSMENT/PLAN:    Myeloproliferative neoplasm, most likely Essential Thrombocytosis with early  myelofibrosis.  CALR L367fs positive.  Low level JAK2 gene variant: p.V617F (1.6%)  She has had thrombocytosis, anemia, left shifted WBC and has borderline splenomegaly.  LDH is 463.    Started Hydrea in December 2021.  She has been on 500 mg once daily.    Overall this is going well.  She feels good.  She is mildly more anemic and platelet count is 563 which is slightly higher than last time.  Otherwise labs are stable.  I would recommend continuing the same dose of Hydrea 500 mg daily for now and monitor labs every 3 months.  Continue baby aspirin.    Previously she was also evaluated by  from bone marrow transplant department whom she saw on 12/29/2021.       GERD.  Continue omeprazole.    We did not address the following today    Pelvic/back pain and extreme fatigue- overall better. CT CAP was essentially unremarkable except borderline splenomegaly     Leg swelling- R>L. US did not show any DVT- swelling has resolved.       I will see her back in 6 months.    All questions answered.  She is agreeable and comfortable with the plan.  Eddie Duong MD    The longitudinal plan of care for the myeloproliferative neoplasm as documented were addressed during this visit. Due to the added complexity in care, I will continue to support Nancy in the subsequent management and with ongoing continuity of care.

## 2024-06-20 NOTE — NURSING NOTE
Chief Complaint   Patient presents with    Blood Draw     Labs collected from venipuncture by RN. Vitals taken. Checked in for appointment(s).      Labs collected from venipuncture by RN. Vitals taken. Checked in for appointment(s).     Felicity Johnson RN

## 2024-06-23 ENCOUNTER — HEALTH MAINTENANCE LETTER (OUTPATIENT)
Age: 64
End: 2024-06-23

## 2024-08-26 DIAGNOSIS — K21.9 GASTROESOPHAGEAL REFLUX DISEASE WITHOUT ESOPHAGITIS: ICD-10-CM

## 2024-08-27 RX ORDER — OMEPRAZOLE 10 MG/1
CAPSULE, DELAYED RELEASE ORAL
Qty: 60 CAPSULE | Refills: 3 | Status: SHIPPED | OUTPATIENT
Start: 2024-08-27

## 2024-08-27 NOTE — TELEPHONE ENCOUNTER
Medication requested: omeprazole 10 mg DR capsule    Last prescribing provider: Eddie Duong MD    Last clinic visit date: 6/20/24 with Dr. Duong    Recommendations for requested medication (if none, N/A): NA    Any other pertinent information (if none, N/A): NA    Refilled: Y/N, if NO, why?    Pended and Routed to Eddie Duong MD

## 2024-09-19 ENCOUNTER — LAB (OUTPATIENT)
Dept: LAB | Facility: CLINIC | Age: 64
End: 2024-09-19
Payer: COMMERCIAL

## 2024-09-19 DIAGNOSIS — D47.1 MPN (MYELOPROLIFERATIVE NEOPLASM) (H): ICD-10-CM

## 2024-09-19 LAB
ALBUMIN SERPL BCG-MCNC: 4.1 G/DL (ref 3.5–5.2)
ALP SERPL-CCNC: 76 U/L (ref 40–150)
ALT SERPL W P-5'-P-CCNC: 16 U/L (ref 0–50)
ANION GAP SERPL CALCULATED.3IONS-SCNC: 10 MMOL/L (ref 7–15)
AST SERPL W P-5'-P-CCNC: 25 U/L (ref 0–45)
BASOPHILS # BLD MANUAL: 0.1 10E3/UL (ref 0–0.2)
BASOPHILS NFR BLD MANUAL: 1 %
BILIRUB SERPL-MCNC: 0.2 MG/DL
BUN SERPL-MCNC: 7.4 MG/DL (ref 8–23)
CALCIUM SERPL-MCNC: 9.1 MG/DL (ref 8.8–10.4)
CHLORIDE SERPL-SCNC: 105 MMOL/L (ref 98–107)
CREAT SERPL-MCNC: 0.47 MG/DL (ref 0.51–0.95)
EGFRCR SERPLBLD CKD-EPI 2021: >90 ML/MIN/1.73M2
EOSINOPHIL # BLD MANUAL: 0 10E3/UL (ref 0–0.7)
EOSINOPHIL NFR BLD MANUAL: 0 %
ERYTHROCYTE [DISTWIDTH] IN BLOOD BY AUTOMATED COUNT: 18.5 % (ref 10–15)
GLUCOSE SERPL-MCNC: 114 MG/DL (ref 70–99)
HCO3 SERPL-SCNC: 25 MMOL/L (ref 22–29)
HCT VFR BLD AUTO: 31.5 % (ref 35–47)
HGB BLD-MCNC: 9.8 G/DL (ref 11.7–15.7)
LDH SERPL L TO P-CCNC: 486 U/L (ref 0–250)
LYMPHOCYTES # BLD MANUAL: 1.9 10E3/UL (ref 0.8–5.3)
LYMPHOCYTES NFR BLD MANUAL: 29 %
MCH RBC QN AUTO: 28.7 PG (ref 26.5–33)
MCHC RBC AUTO-ENTMCNC: 31.1 G/DL (ref 31.5–36.5)
MCV RBC AUTO: 92 FL (ref 78–100)
METAMYELOCYTES # BLD MANUAL: 0.1 10E3/UL
METAMYELOCYTES NFR BLD MANUAL: 1 %
MONOCYTES # BLD MANUAL: 0.7 10E3/UL (ref 0–1.3)
MONOCYTES NFR BLD MANUAL: 11 %
NEUTROPHILS # BLD MANUAL: 3.8 10E3/UL (ref 1.6–8.3)
NEUTROPHILS NFR BLD MANUAL: 58 %
NRBC # BLD AUTO: 0.3 10E3/UL
NRBC # BLD AUTO: 0.4 10E3/UL
NRBC BLD AUTO-RTO: 6 /100
NRBC BLD MANUAL-RTO: 4 %
PLAT MORPH BLD: ABNORMAL
PLATELET # BLD AUTO: 541 10E3/UL (ref 150–450)
POTASSIUM SERPL-SCNC: 4 MMOL/L (ref 3.4–5.3)
PROT SERPL-MCNC: 7 G/DL (ref 6.4–8.3)
RBC # BLD AUTO: 3.41 10E6/UL (ref 3.8–5.2)
RBC MORPH BLD: ABNORMAL
SODIUM SERPL-SCNC: 140 MMOL/L (ref 135–145)
WBC # BLD AUTO: 6.5 10E3/UL (ref 4–11)

## 2024-09-19 PROCEDURE — 85014 HEMATOCRIT: CPT

## 2024-09-19 PROCEDURE — 80053 COMPREHEN METABOLIC PANEL: CPT

## 2024-09-19 PROCEDURE — 83615 LACTATE (LD) (LDH) ENZYME: CPT

## 2024-09-19 PROCEDURE — 36415 COLL VENOUS BLD VENIPUNCTURE: CPT

## 2024-09-19 PROCEDURE — 85007 BL SMEAR W/DIFF WBC COUNT: CPT

## 2024-09-19 NOTE — NURSING NOTE
Chief Complaint   Patient presents with    Labs Only     venipuncture     Evy Mathews RN on 9/19/2024 at 10:01 AM

## 2024-11-10 ENCOUNTER — HEALTH MAINTENANCE LETTER (OUTPATIENT)
Age: 64
End: 2024-11-10

## 2025-03-02 ENCOUNTER — HEALTH MAINTENANCE LETTER (OUTPATIENT)
Age: 65
End: 2025-03-02

## 2025-06-05 ENCOUNTER — ONCOLOGY VISIT (OUTPATIENT)
Dept: ONCOLOGY | Facility: CLINIC | Age: 65
End: 2025-06-05
Attending: INTERNAL MEDICINE
Payer: COMMERCIAL

## 2025-06-05 ENCOUNTER — VIRTUAL VISIT (OUTPATIENT)
Dept: INTERPRETER SERVICES | Facility: CLINIC | Age: 65
End: 2025-06-05

## 2025-06-05 ENCOUNTER — APPOINTMENT (OUTPATIENT)
Dept: LAB | Facility: CLINIC | Age: 65
End: 2025-06-05
Attending: INTERNAL MEDICINE
Payer: COMMERCIAL

## 2025-06-05 VITALS
DIASTOLIC BLOOD PRESSURE: 64 MMHG | TEMPERATURE: 97.7 F | WEIGHT: 180 LBS | HEART RATE: 72 BPM | RESPIRATION RATE: 16 BRPM | OXYGEN SATURATION: 100 % | SYSTOLIC BLOOD PRESSURE: 132 MMHG | BODY MASS INDEX: 26.58 KG/M2

## 2025-06-05 DIAGNOSIS — D47.1 MPN (MYELOPROLIFERATIVE NEOPLASM) (H): ICD-10-CM

## 2025-06-05 LAB
ALBUMIN SERPL BCG-MCNC: 4.4 G/DL (ref 3.5–5.2)
ALP SERPL-CCNC: 84 U/L (ref 40–150)
ALT SERPL W P-5'-P-CCNC: 12 U/L (ref 0–50)
ANION GAP SERPL CALCULATED.3IONS-SCNC: 12 MMOL/L (ref 7–15)
AST SERPL W P-5'-P-CCNC: 20 U/L (ref 0–45)
BASOPHILS # BLD AUTO: 0.1 10E3/UL (ref 0–0.2)
BASOPHILS NFR BLD AUTO: 2 %
BILIRUB SERPL-MCNC: 0.3 MG/DL
BUN SERPL-MCNC: 10.5 MG/DL (ref 8–23)
CALCIUM SERPL-MCNC: 9.3 MG/DL (ref 8.8–10.4)
CHLORIDE SERPL-SCNC: 107 MMOL/L (ref 98–107)
CREAT SERPL-MCNC: 0.5 MG/DL (ref 0.51–0.95)
EGFRCR SERPLBLD CKD-EPI 2021: >90 ML/MIN/1.73M2
EOSINOPHIL # BLD AUTO: 0.1 10E3/UL (ref 0–0.7)
EOSINOPHIL NFR BLD AUTO: 2 %
ERYTHROCYTE [DISTWIDTH] IN BLOOD BY AUTOMATED COUNT: 18.6 % (ref 10–15)
GLUCOSE SERPL-MCNC: 102 MG/DL (ref 70–99)
HCO3 SERPL-SCNC: 23 MMOL/L (ref 22–29)
HCT VFR BLD AUTO: 33.6 % (ref 35–47)
HGB BLD-MCNC: 10.7 G/DL (ref 11.7–15.7)
IMM GRANULOCYTES # BLD: 0.3 10E3/UL
IMM GRANULOCYTES NFR BLD: 5 %
LDH SERPL L TO P-CCNC: 493 U/L (ref 0–250)
LYMPHOCYTES # BLD AUTO: 1.8 10E3/UL (ref 0.8–5.3)
LYMPHOCYTES NFR BLD AUTO: 29 %
MCH RBC QN AUTO: 28.5 PG (ref 26.5–33)
MCHC RBC AUTO-ENTMCNC: 31.8 G/DL (ref 31.5–36.5)
MCV RBC AUTO: 89 FL (ref 78–100)
MONOCYTES # BLD AUTO: 0.6 10E3/UL (ref 0–1.3)
MONOCYTES NFR BLD AUTO: 10 %
NEUTROPHILS # BLD AUTO: 3.4 10E3/UL (ref 1.6–8.3)
NEUTROPHILS NFR BLD AUTO: 54 %
NRBC # BLD AUTO: 0.2 10E3/UL
NRBC BLD AUTO-RTO: 4 /100
PLATELET # BLD AUTO: 514 10E3/UL (ref 150–450)
POTASSIUM SERPL-SCNC: 3.8 MMOL/L (ref 3.4–5.3)
PROT SERPL-MCNC: 7.3 G/DL (ref 6.4–8.3)
RBC # BLD AUTO: 3.76 10E6/UL (ref 3.8–5.2)
SODIUM SERPL-SCNC: 142 MMOL/L (ref 135–145)
WBC # BLD AUTO: 6.3 10E3/UL (ref 4–11)

## 2025-06-05 PROCEDURE — 85004 AUTOMATED DIFF WBC COUNT: CPT | Performed by: INTERNAL MEDICINE

## 2025-06-05 PROCEDURE — 36415 COLL VENOUS BLD VENIPUNCTURE: CPT | Performed by: INTERNAL MEDICINE

## 2025-06-05 PROCEDURE — G0463 HOSPITAL OUTPT CLINIC VISIT: HCPCS | Performed by: INTERNAL MEDICINE

## 2025-06-05 PROCEDURE — T1013 SIGN LANG/ORAL INTERPRETER: HCPCS | Mod: U4,TEL,95

## 2025-06-05 PROCEDURE — 82040 ASSAY OF SERUM ALBUMIN: CPT | Performed by: INTERNAL MEDICINE

## 2025-06-05 PROCEDURE — 83615 LACTATE (LD) (LDH) ENZYME: CPT | Performed by: INTERNAL MEDICINE

## 2025-06-05 RX ORDER — ASPIRIN 81 MG/1
81 TABLET ORAL DAILY
Qty: 90 TABLET | Refills: 3 | Status: SHIPPED | OUTPATIENT
Start: 2025-06-05

## 2025-06-05 RX ORDER — HYDROXYUREA 500 MG/1
500 CAPSULE ORAL DAILY
Qty: 90 CAPSULE | Refills: 3 | Status: SHIPPED | OUTPATIENT
Start: 2025-06-05

## 2025-06-05 ASSESSMENT — PAIN SCALES - GENERAL: PAINLEVEL_OUTOF10: NO PAIN (0)

## 2025-06-05 NOTE — PROGRESS NOTES
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  CALR L367fs positive.  Low level JAK2 gene variant: p.V617F (1.6%)    Primary Physician: Tasha Nick     History Of Present Illness:  Ms. Rodríguez is a 65 year old female who presents with thrombocytosis, anemia, left shifted WBC and blasts on smear.    I initially saw her on 9/28/2021.  Please see previous note for details.  I have copied and updated from prior note.    Looking back she has had thrombocytosis and anemia for a number of years.  On 8/20/2021, peripheral blood smear showed left shifted WBC with blast-like cells in addition to anemia and thrombocytosis.      She feels pain in the pelvic area and back pain. Sometimes feels leg are swollen. Right ankle is more swollen. It is not more swollen. This is going on for 4 months. She denies B symptoms. No abnormal bleeding, erythromelalgia, dyspnea. Denies significant dyspnea. Feels fatigued. No nausea, vomiting, diarrhea or constipation. Last mammogram was few years ago.      I did further work-up including bone marrow biopsy.    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  CALR L367fs positive.  Low level JAK2 gene variant: p.V617F (1.6%)  She has had thrombocytosis, anemia, left shifted WBC and has borderline splenomegaly.  LDH is 463.      She started taking Hydrea 500 mg daily in mid December 2021.       Interval history.  She comes in today. A professional [facility]  is present through iPad today.    She went to [facility] and stayed there for more than 5 months and came back last month.  She has been doing well.  She has been taking Hydrea 500 mg daily and baby aspirin daily.  She denies any B symptoms or new pain or new swellings or erythromelalgia.  No infections or shortness of breath.  No GI issues.        Rest of  the comprehensive review of the system was unremarkable.    I reviewed other history in epic as below.      Past Medical/Surgical History:  Past Medical History:   Diagnosis Date    Cancer (H)     Diabetes (H)     Headache     Nonsenile cataract      Past Surgical History:   Procedure Laterality Date    ANKLE SURGERY      left     Allergies:  Allergies as of 06/05/2025    (No Known Allergies)     Current Medications:  Current Outpatient Medications   Medication Sig Dispense Refill    ACCU-CHEK GUIDE test strip  (Patient not taking: Reported on 3/16/2023)      acetaminophen (TYLENOL) 500 MG tablet TAKE 1 TABLET(500 MG) BY MOUTH EVERY 4 HOURS AS NEEDED      ammonium lactate (LAC-HYDRIN) 12 % external lotion APPLY TOPICALLY TO THE AFFECTED AREA TWICE DAILY AS NEEDED (Patient not taking: Reported on 12/14/2023)      aspirin 81 MG EC tablet Take 1 tablet (81 mg) by mouth daily 90 tablet 3    atorvastatin (LIPITOR) 80 MG tablet Take 1 tablet by mouth daily      blood glucose (ACCU-CHEK FASTCLIX) lancing device 1 kit by Other route      blood glucose (NO BRAND SPECIFIED) test strip       blood glucose monitoring (ACCU-CHEK FASTCLIX) lancets       Calcium Carb-Cholecalciferol (CALCIUM 600 + D PO) Take 1 tablet by mouth daily      capsaicin (ZOSTRIX) 0.025 % external cream capsaicin 0.025 % topical cream   BALDEMAR 1 APPLICATION AA TID PRN P      cetirizine (ZYRTEC) 10 MG tablet Take 1 tablet by mouth daily at 2 pm      cholecalciferol (VITAMIN D3) 125 mcg (5000 units) capsule TAKE 1 CAPSULE BY MOUTH ONCE DAILY      cyclobenzaprine (FLEXERIL) 10 MG tablet cyclobenzaprine 10 mg tablet   TK 1 T PO TID PRF MUSCLE SPASM      DOCUSATE SODIUM PO Take 100 mg by mouth daily      erythromycin (ROMYCIN) 5 MG/GM ophthalmic ointment APPLY OINTMENT TO INCISION LINES THREE TIMES DAILY FOR 7 DAYS THEN EVERY AT BEDTIME FOR 7 DAYS      ferrous sulfate (FEROSUL) 325 (65 Fe) MG tablet       fluticasone (FLONASE) 50 MCG/ACT nasal spray SHAKE LIQUID  AND USE 1 SPRAY IN EACH NOSTRIL EVERY DAY AS NEEDED      HYDROcodone-acetaminophen (NORCO) 5-325 MG tablet Take 1 tablet by mouth every 6 hours as needed      hydroxyurea (HYDREA) 500 MG capsule Take 1 capsule (500 mg) by mouth daily 90 capsule 3    ibuprofen (ADVIL/MOTRIN) 600 MG tablet Take 1 tablet (600 mg) by mouth every 8 hours as needed for moderate pain 60 tablet 0    methocarbamol (ROBAXIN) 500 MG tablet TAKE 1 TABLET BY MOUTH TWICE DAILY AS NEEDED      omeprazole (PRILOSEC) 10 MG DR capsule TAKE TWO (2) CAPSULES(20 MG) BY MOUTH ONCE DAILY 60 capsule 3    polyethylene glycol (MIRALAX) 17 GM/Dose powder Take 17 g by mouth daily as needed for constipation 510 g 1    propranolol ER (INDERAL LA) 160 MG 24 hr capsule       QUETIAPINE FUMARATE PO Take 50 mg by mouth      venlafaxine (EFFEXOR-XR) 75 MG 24 hr capsule venlafaxine ER 75 mg capsule,extended release 24 hr   TAKE ONE CAPSULE BY MOUTH ONCE DAILY (Patient not taking: Reported on 12/14/2023)        Family History:  Family History   Problem Relation Age of Onset    Glaucoma No family hx of     Macular Degeneration No family hx of      No family history of bleeding or clotting disorder or cancers.    Social History:  Social History     Socioeconomic History    Marital status: Single     Spouse name: Not on file    Number of children: Not on file    Years of education: Not on file    Highest education level: Not on file   Occupational History    Not on file   Tobacco Use    Smoking status: Never    Smokeless tobacco: Never   Substance and Sexual Activity    Alcohol use: No     Alcohol/week: 0.0 standard drinks of alcohol    Drug use: No    Sexual activity: Not Currently     Partners: Male   Other Topics Concern    Not on file   Social History Narrative    Not on file     Social Drivers of Health     Financial Resource Strain: Not on File (11/8/2019)    Received from [employer]    Financial Resource Strain     Financial Resource Strain: 0   Food Insecurity: Not on  File (2024)    Received from [employer]    Food Insecurity     Food: 0   Transportation Needs: Not on File (2019)    Received from [employer]    Transportation Needs     Transportation: 0   Physical Activity: Not on File (2019)    Received from [employer]    Physical Activity     Physical Activity: 0   Stress: Not on File (2019)    Received from [employer]    Stress     Stress: 0   Social Connections: Not on File (2024)    Received from [employer]    Social Connections     Connectedness: 0   Interpersonal Safety: Not At Risk (10/28/2021)    Humiliation, Afraid, Rape, and Kick questionnaire     Fear of Current or Ex-Partner: No     Emotionally Abused: No     Physically Abused: No     Sexually Abused: No   Housing Stability: Not on File (2019)    Received from [employer]    Housing Stability     Housin     Physical Exam:  /64   Pulse 72   Temp 97.7  F (36.5  C)   Resp 16   Wt 81.6 kg (180 lb)   SpO2 100%   BMI 26.58 kg/m       Wt Readings from Last 4 Encounters:   25 81.6 kg (180 lb)   24 83 kg (183 lb)   24 81.2 kg (179 lb)   23 86.6 kg (191 lb)     CONSTITUTIONAL: No apparent distress  EYES: PERRLA, mild pallor but no jaundice.  ENT/MOUTH: Ears unremarkable. No oral lesions  CVS: s1s2 normal  RESPIRATORY: Chest is clear  GI: Abdomen is benign  NEURO: Alert and oriented ×3  INTEGUMENT: no concerning skin rashes   LYMPHATIC: no palpable lymphadenopathy  MUSCULOSKELETAL: Unremarkable. No bony tenderness.   EXTREMITIES: no pedal edema  PSYCH: Mentation, mood and affect are appropriate        Laboratory/Imaging Studies      Reviewed  2025    CBC showed WBC 6.3.  Hemoglobin 10.7.  Platelets 514.     CMP and LDH are pending from today.    2024    CMP unremarkable.    .      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  CALR L367fs positive.  Low level JAK2 gene variant: p.V617F (1.6%)  She has had  thrombocytosis, anemia, left shifted WBC and has borderline splenomegaly.  LDH is 463.    Started Hydrea in December 2021.  She has been on 500 mg once daily.    Overall she is tolerating this well.      Blood counts are stable.  She remains mildly anemic and she has mild thrombocytosis.  We will follow CMP and LDH and assuming that there are no surprises, I would recommend continuing with Hydrea 500 mg daily and baby aspirin daily.  I refilled her medications today.    We should monitor labs every 3 months for now.    Previously she was also evaluated by  from bone marrow transplant department whom she saw on 12/29/2021.       GERD.  Continue omeprazole.    We did not address the following today    Pelvic/back pain and extreme fatigue- overall better. CT CAP was essentially unremarkable except borderline splenomegaly     Leg swelling- R>L. US did not show any DVT- swelling has resolved.       I will see her back in 6 months.    All questions answered.  She is agreeable and comfortable with the plan.  Eddie Duong MD    The longitudinal plan of care for the myeloproliferative neoplasm as documented were addressed during this visit. Due to the added complexity in care, I will continue to support Nancy in the subsequent management and with ongoing continuity of care.    Addendum  CMP is unremarkable  .    We will continue same dose of Hydrea and the plan as mentioned above.  Eddie Duong MD   6/5/2025

## 2025-06-05 NOTE — LETTER
6/5/2025      Nancy Rodríguez  [address]  [address]  [facility] [address]      Dear Colleague,    Thank you for referring your patient, Nancy Rodríguez, to the [facility]. Please see a copy of my visit note below.    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  CALR L367fs positive.  Low level JAK2 gene variant: p.V617F (1.6%)    Primary Physician: Tasha Nick     History Of Present Illness:  Ms. Rodríguez is a 65 year old female who presents with thrombocytosis, anemia, left shifted WBC and blasts on smear.    I initially saw her on 9/28/2021.  Please see previous note for details.  I have copied and updated from prior note.    Looking back she has had thrombocytosis and anemia for a number of years.  On 8/20/2021, peripheral blood smear showed left shifted WBC with blast-like cells in addition to anemia and thrombocytosis.      She feels pain in the pelvic area and back pain. Sometimes feels leg are swollen. Right ankle is more swollen. It is not more swollen. This is going on for 4 months. She denies B symptoms. No abnormal bleeding, erythromelalgia, dyspnea. Denies significant dyspnea. Feels fatigued. No nausea, vomiting, diarrhea or constipation. Last mammogram was few years ago.      I did further work-up including bone marrow biopsy.    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  CALR L367fs positive.  Low level JAK2 gene variant: p.V617F (1.6%)  She has had thrombocytosis, anemia, left shifted WBC and has borderline splenomegaly.  LDH is 463.      She started taking Hydrea 500 mg daily in mid December 2021.       Interval history.  She comes in today. A professional [facility]  is present through iPad today.    She went to [facility] and stayed there for more than 5 months and came back  last month.  She has been doing well.  She has been taking Hydrea 500 mg daily and baby aspirin daily.  She denies any B symptoms or new pain or new swellings or erythromelalgia.  No infections or shortness of breath.  No GI issues.        Rest of the comprehensive review of the system was unremarkable.    I reviewed other history in epic as below.      Past Medical/Surgical History:  Past Medical History:   Diagnosis Date     Cancer (H)      Diabetes (H)      Headache      Nonsenile cataract      Past Surgical History:   Procedure Laterality Date     ANKLE SURGERY      left     Allergies:  Allergies as of 06/05/2025     (No Known Allergies)     Current Medications:  Current Outpatient Medications   Medication Sig Dispense Refill     ACCU-CHEK GUIDE test strip  (Patient not taking: Reported on 3/16/2023)       acetaminophen (TYLENOL) 500 MG tablet TAKE 1 TABLET(500 MG) BY MOUTH EVERY 4 HOURS AS NEEDED       ammonium lactate (LAC-HYDRIN) 12 % external lotion APPLY TOPICALLY TO THE AFFECTED AREA TWICE DAILY AS NEEDED (Patient not taking: Reported on 12/14/2023)       aspirin 81 MG EC tablet Take 1 tablet (81 mg) by mouth daily 90 tablet 3     atorvastatin (LIPITOR) 80 MG tablet Take 1 tablet by mouth daily       blood glucose (ACCU-CHEK FASTCLIX) lancing device 1 kit by Other route       blood glucose (NO BRAND SPECIFIED) test strip        blood glucose monitoring (ACCU-CHEK FASTCLIX) lancets        Calcium Carb-Cholecalciferol (CALCIUM 600 + D PO) Take 1 tablet by mouth daily       capsaicin (ZOSTRIX) 0.025 % external cream capsaicin 0.025 % topical cream   BALDEMAR 1 APPLICATION AA TID PRN P       cetirizine (ZYRTEC) 10 MG tablet Take 1 tablet by mouth daily at 2 pm       cholecalciferol (VITAMIN D3) 125 mcg (5000 units) capsule TAKE 1 CAPSULE BY MOUTH ONCE DAILY       cyclobenzaprine (FLEXERIL) 10 MG tablet cyclobenzaprine 10 mg tablet   TK 1 T PO TID PRF MUSCLE SPASM       DOCUSATE SODIUM PO Take 100 mg by mouth daily        erythromycin (ROMYCIN) 5 MG/GM ophthalmic ointment APPLY OINTMENT TO INCISION LINES THREE TIMES DAILY FOR 7 DAYS THEN EVERY AT BEDTIME FOR 7 DAYS       ferrous sulfate (FEROSUL) 325 (65 Fe) MG tablet        fluticasone (FLONASE) 50 MCG/ACT nasal spray SHAKE LIQUID AND USE 1 SPRAY IN EACH NOSTRIL EVERY DAY AS NEEDED       HYDROcodone-acetaminophen (NORCO) 5-325 MG tablet Take 1 tablet by mouth every 6 hours as needed       hydroxyurea (HYDREA) 500 MG capsule Take 1 capsule (500 mg) by mouth daily 90 capsule 3     ibuprofen (ADVIL/MOTRIN) 600 MG tablet Take 1 tablet (600 mg) by mouth every 8 hours as needed for moderate pain 60 tablet 0     methocarbamol (ROBAXIN) 500 MG tablet TAKE 1 TABLET BY MOUTH TWICE DAILY AS NEEDED       omeprazole (PRILOSEC) 10 MG DR capsule TAKE TWO (2) CAPSULES(20 MG) BY MOUTH ONCE DAILY 60 capsule 3     polyethylene glycol (MIRALAX) 17 GM/Dose powder Take 17 g by mouth daily as needed for constipation 510 g 1     propranolol ER (INDERAL LA) 160 MG 24 hr capsule        QUETIAPINE FUMARATE PO Take 50 mg by mouth       venlafaxine (EFFEXOR-XR) 75 MG 24 hr capsule venlafaxine ER 75 mg capsule,extended release 24 hr   TAKE ONE CAPSULE BY MOUTH ONCE DAILY (Patient not taking: Reported on 12/14/2023)        Family History:  Family History   Problem Relation Age of Onset     Glaucoma No family hx of      Macular Degeneration No family hx of      No family history of bleeding or clotting disorder or cancers.    Social History:  Social History     Socioeconomic History     Marital status: Single     Spouse name: Not on file     Number of children: Not on file     Years of education: Not on file     Highest education level: Not on file   Occupational History     Not on file   Tobacco Use     Smoking status: Never     Smokeless tobacco: Never   Substance and Sexual Activity     Alcohol use: No     Alcohol/week: 0.0 standard drinks of alcohol     Drug use: No     Sexual activity: Not Currently      Partners: Male   Other Topics Concern     Not on file   Social History Narrative     Not on file     Social Drivers of Health     Financial Resource Strain: Not on File (2019)    Received from [employer]    Financial Resource Strain      Financial Resource Strain: 0   Food Insecurity: Not on File (2024)    Received from [employer]    Food Insecurity      Food: 0   Transportation Needs: Not on File (2019)    Received from [employer]    Transportation Needs      Transportation: 0   Physical Activity: Not on File (2019)    Received from [employer]    Physical Activity      Physical Activity: 0   Stress: Not on File (2019)    Received from [employer]    Stress      Stress: 0   Social Connections: Not on File (2024)    Received from [employer]    Social Connections      Connectedness: 0   Interpersonal Safety: Not At Risk (10/28/2021)    Humiliation, Afraid, Rape, and Kick questionnaire      Fear of Current or Ex-Partner: No      Emotionally Abused: No      Physically Abused: No      Sexually Abused: No   Housing Stability: Not on File (2019)    Received from [employer]    Housing Stability      Housin     Physical Exam:  /64   Pulse 72   Temp 97.7  F (36.5  C)   Resp 16   Wt 81.6 kg (180 lb)   SpO2 100%   BMI 26.58 kg/m       Wt Readings from Last 4 Encounters:   25 81.6 kg (180 lb)   24 83 kg (183 lb)   24 81.2 kg (179 lb)   23 86.6 kg (191 lb)     CONSTITUTIONAL: No apparent distress  EYES: PERRLA, mild pallor but no jaundice.  ENT/MOUTH: Ears unremarkable. No oral lesions  CVS: s1s2 normal  RESPIRATORY: Chest is clear  GI: Abdomen is benign  NEURO: Alert and oriented ×3  INTEGUMENT: no concerning skin rashes   LYMPHATIC: no palpable lymphadenopathy  MUSCULOSKELETAL: Unremarkable. No bony tenderness.   EXTREMITIES: no pedal edema  PSYCH: Mentation, mood and affect are appropriate        Laboratory/Imaging Studies      Reviewed  2025    CBC showed WBC 6.3.  Hemoglobin 10.7.   Platelets 514.     CMP and LDH are pending from today.    9/19/2024    CMP unremarkable.    .      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  CALR L367fs positive.  Low level JAK2 gene variant: p.V617F (1.6%)  She has had thrombocytosis, anemia, left shifted WBC and has borderline splenomegaly.  LDH is 463.    Started Hydrea in December 2021.  She has been on 500 mg once daily.    Overall she is tolerating this well.      Blood counts are stable.  She remains mildly anemic and she has mild thrombocytosis.  We will follow CMP and LDH and assuming that there are no surprises, I would recommend continuing with Hydrea 500 mg daily and baby aspirin daily.  I refilled her medications today.    We should monitor labs every 3 months for now.    Previously she was also evaluated by  from bone marrow transplant department whom she saw on 12/29/2021.       GERD.  Continue omeprazole.    We did not address the following today    Pelvic/back pain and extreme fatigue- overall better. CT CAP was essentially unremarkable except borderline splenomegaly     Leg swelling- R>L. US did not show any DVT- swelling has resolved.       I will see her back in 6 months.    All questions answered.  She is agreeable and comfortable with the plan.  Eddie Dunog MD    The longitudinal plan of care for the myeloproliferative neoplasm as documented were addressed during this visit. Due to the added complexity in care, I will continue to support Nancy in the subsequent management and with ongoing continuity of care.      Again, thank you for allowing me to participate in the care of your patient.        Sincerely,        Eddie Duong MD    Electronically signed

## 2025-06-05 NOTE — NURSING NOTE
"Oncology Rooming Note    June 5, 2025 3:15 PM   Nancy Rodríguez is a 65 year old female who presents for:    Chief Complaint   Patient presents with    Blood Draw     Labs collected from venipuncture by RN. Vitals taken. Checked in for appointment(s).     Oncology Clinic Visit     MPN (myeloproliferative neoplasm)     Initial Vitals: /64   Pulse 72   Temp 97.7  F (36.5  C)   Resp 16   Wt 81.6 kg (180 lb)   SpO2 100%   BMI 26.58 kg/m   Estimated body mass index is 26.58 kg/m  as calculated from the following:    Height as of 11/22/22: 1.753 m (5' 9\").    Weight as of this encounter: 81.6 kg (180 lb). Body surface area is 1.99 meters squared.  No Pain (0) Comment: Data Unavailable   No LMP recorded. Patient is postmenopausal.  Allergies reviewed: Yes  Medications reviewed: Yes    Medications: MEDICATION REFILLS NEEDED TODAY. Provider was notified.  Pharmacy name entered into [facility]:    [id number] - [place], [place] - [address] AT [address] [facility] - [place], [place] - [address] [facility] [address]    Frailty Screening:   Is the patient here for a new oncology consult visit in cancer care? 2. No      Clinical concerns: Pt needs refills on medications prescribed by Dr. Duong. Pt reports no new concerns today. Dr. Duong was notified via message.       Skye Carvajal, EMT     "

## 2025-06-21 ENCOUNTER — HEALTH MAINTENANCE LETTER (OUTPATIENT)
Age: 65
End: 2025-06-21

## 2025-07-12 ENCOUNTER — HEALTH MAINTENANCE LETTER (OUTPATIENT)
Age: 65
End: 2025-07-12

## 2025-08-27 ENCOUNTER — LAB REQUISITION (OUTPATIENT)
Dept: LAB | Facility: CLINIC | Age: 65
End: 2025-08-27
Payer: COMMERCIAL

## 2025-08-27 ENCOUNTER — TRANSCRIBE ORDERS (OUTPATIENT)
Dept: OTHER | Age: 65
End: 2025-08-27

## 2025-08-27 DIAGNOSIS — E11.9 TYPE 2 DIABETES MELLITUS WITHOUT COMPLICATIONS (H): ICD-10-CM

## 2025-08-27 DIAGNOSIS — M25.569 CHRONIC KNEE PAIN, UNSPECIFIED LATERALITY: Primary | ICD-10-CM

## 2025-08-27 DIAGNOSIS — G89.29 CHRONIC KNEE PAIN, UNSPECIFIED LATERALITY: Primary | ICD-10-CM

## 2025-08-27 DIAGNOSIS — Z11.3 ENCOUNTER FOR SCREENING FOR INFECTIONS WITH A PREDOMINANTLY SEXUAL MODE OF TRANSMISSION: ICD-10-CM

## 2025-08-27 LAB
CHOLEST SERPL-MCNC: 201 MG/DL
FASTING STATUS PATIENT QL REPORTED: ABNORMAL
HCV AB SERPL QL IA: NONREACTIVE
HDLC SERPL-MCNC: 46 MG/DL
HIV 1+2 AB+HIV1 P24 AG SERPL QL IA: NONREACTIVE
LDLC SERPL CALC-MCNC: 132 MG/DL
NONHDLC SERPL-MCNC: 155 MG/DL
TRIGL SERPL-MCNC: 116 MG/DL

## 2025-08-28 ENCOUNTER — PATIENT OUTREACH (OUTPATIENT)
Dept: CARE COORDINATION | Facility: CLINIC | Age: 65
End: 2025-08-28
Payer: COMMERCIAL